# Patient Record
Sex: MALE | Race: WHITE | Employment: OTHER | URBAN - METROPOLITAN AREA
[De-identification: names, ages, dates, MRNs, and addresses within clinical notes are randomized per-mention and may not be internally consistent; named-entity substitution may affect disease eponyms.]

---

## 2020-03-05 ENCOUNTER — APPOINTMENT (OUTPATIENT)
Dept: GENERAL RADIOLOGY | Age: 75
End: 2020-03-05
Payer: MEDICARE

## 2020-03-05 ENCOUNTER — HOSPITAL ENCOUNTER (EMERGENCY)
Age: 75
Discharge: HOME | End: 2020-03-06
Attending: EMERGENCY MEDICINE
Payer: MEDICARE

## 2020-03-05 VITALS
HEIGHT: 67 IN | WEIGHT: 240 LBS | TEMPERATURE: 99 F | DIASTOLIC BLOOD PRESSURE: 86 MMHG | OXYGEN SATURATION: 94 % | BODY MASS INDEX: 37.67 KG/M2 | HEART RATE: 102 BPM | SYSTOLIC BLOOD PRESSURE: 140 MMHG | RESPIRATION RATE: 20 BRPM

## 2020-03-05 LAB
A/G RATIO: 1 (ref 1.1–2.2)
ALBUMIN SERPL-MCNC: 3.8 G/DL (ref 3.4–5)
ALP BLD-CCNC: 62 U/L (ref 40–129)
ALT SERPL-CCNC: 20 U/L (ref 10–40)
ANION GAP SERPL CALCULATED.3IONS-SCNC: 11 MMOL/L (ref 3–16)
AST SERPL-CCNC: 22 U/L (ref 15–37)
BASOPHILS ABSOLUTE: 0.1 K/UL (ref 0–0.2)
BASOPHILS RELATIVE PERCENT: 1.1 %
BILIRUB SERPL-MCNC: 0.3 MG/DL (ref 0–1)
BUN BLDV-MCNC: 32 MG/DL (ref 7–20)
CALCIUM SERPL-MCNC: 9.3 MG/DL (ref 8.3–10.6)
CHLORIDE BLD-SCNC: 98 MMOL/L (ref 99–110)
CO2: 28 MMOL/L (ref 21–32)
CREAT SERPL-MCNC: 1.4 MG/DL (ref 0.8–1.3)
EOSINOPHILS ABSOLUTE: 0 K/UL (ref 0–0.6)
EOSINOPHILS RELATIVE PERCENT: 0.5 %
GFR AFRICAN AMERICAN: 60
GFR NON-AFRICAN AMERICAN: 49
GLOBULIN: 3.8 G/DL
GLUCOSE BLD-MCNC: 104 MG/DL (ref 70–99)
HCT VFR BLD CALC: 34 % (ref 40.5–52.5)
HEMOGLOBIN: 10.9 G/DL (ref 13.5–17.5)
LYMPHOCYTES ABSOLUTE: 0.9 K/UL (ref 1–5.1)
LYMPHOCYTES RELATIVE PERCENT: 11.9 %
MAGNESIUM: 2.3 MG/DL (ref 1.8–2.4)
MCH RBC QN AUTO: 29.8 PG (ref 26–34)
MCHC RBC AUTO-ENTMCNC: 32.2 G/DL (ref 31–36)
MCV RBC AUTO: 92.6 FL (ref 80–100)
MONOCYTES ABSOLUTE: 0.9 K/UL (ref 0–1.3)
MONOCYTES RELATIVE PERCENT: 12.3 %
NEUTROPHILS ABSOLUTE: 5.7 K/UL (ref 1.7–7.7)
NEUTROPHILS RELATIVE PERCENT: 74.2 %
PDW BLD-RTO: 18.7 % (ref 12.4–15.4)
PLATELET # BLD: 264 K/UL (ref 135–450)
PMV BLD AUTO: 7 FL (ref 5–10.5)
POTASSIUM SERPL-SCNC: 4.5 MMOL/L (ref 3.5–5.1)
PRO-BNP: 1182 PG/ML (ref 0–449)
RBC # BLD: 3.67 M/UL (ref 4.2–5.9)
SODIUM BLD-SCNC: 137 MMOL/L (ref 136–145)
TOTAL PROTEIN: 7.6 G/DL (ref 6.4–8.2)
TROPONIN: <0.01 NG/ML
WBC # BLD: 7.7 K/UL (ref 4–11)

## 2020-03-05 PROCEDURE — 84484 ASSAY OF TROPONIN QUANT: CPT

## 2020-03-05 PROCEDURE — 93005 ELECTROCARDIOGRAM TRACING: CPT | Performed by: EMERGENCY MEDICINE

## 2020-03-05 PROCEDURE — 83735 ASSAY OF MAGNESIUM: CPT

## 2020-03-05 PROCEDURE — 6360000002 HC RX W HCPCS: Performed by: EMERGENCY MEDICINE

## 2020-03-05 PROCEDURE — 96374 THER/PROPH/DIAG INJ IV PUSH: CPT

## 2020-03-05 PROCEDURE — 71046 X-RAY EXAM CHEST 2 VIEWS: CPT

## 2020-03-05 PROCEDURE — 30901 CONTROL OF NOSEBLEED: CPT

## 2020-03-05 PROCEDURE — 99284 EMERGENCY DEPT VISIT MOD MDM: CPT

## 2020-03-05 PROCEDURE — 83880 ASSAY OF NATRIURETIC PEPTIDE: CPT

## 2020-03-05 PROCEDURE — 80053 COMPREHEN METABOLIC PANEL: CPT

## 2020-03-05 PROCEDURE — 85025 COMPLETE CBC W/AUTO DIFF WBC: CPT

## 2020-03-05 RX ORDER — OXYMETAZOLINE HYDROCHLORIDE 0.05 G/100ML
SPRAY NASAL
Status: DISCONTINUED
Start: 2020-03-05 | End: 2020-03-06 | Stop reason: HOSPADM

## 2020-03-05 RX ORDER — ONDANSETRON 2 MG/ML
4 INJECTION INTRAMUSCULAR; INTRAVENOUS ONCE
Status: COMPLETED | OUTPATIENT
Start: 2020-03-05 | End: 2020-03-05

## 2020-03-05 RX ADMIN — ONDANSETRON 4 MG: 2 INJECTION INTRAMUSCULAR; INTRAVENOUS at 23:25

## 2020-03-05 ASSESSMENT — ENCOUNTER SYMPTOMS
VOMITING: 0
ABDOMINAL PAIN: 0
NAUSEA: 0
SHORTNESS OF BREATH: 0
SORE THROAT: 0
COLOR CHANGE: 0
DIARRHEA: 0

## 2020-03-06 LAB
EKG ATRIAL RATE: 111 BPM
EKG DIAGNOSIS: NORMAL
EKG Q-T INTERVAL: 406 MS
EKG QRS DURATION: 106 MS
EKG QTC CALCULATION (BAZETT): 499 MS
EKG R AXIS: 91 DEGREES
EKG T AXIS: 38 DEGREES
EKG VENTRICULAR RATE: 91 BPM

## 2020-03-06 PROCEDURE — 6370000000 HC RX 637 (ALT 250 FOR IP): Performed by: EMERGENCY MEDICINE

## 2020-03-06 RX ORDER — CEFUROXIME AXETIL 250 MG/1
250 TABLET ORAL 2 TIMES DAILY
Qty: 10 TABLET | Refills: 0 | Status: SHIPPED | OUTPATIENT
Start: 2020-03-06 | End: 2020-03-11

## 2020-03-06 RX ORDER — CEFUROXIME AXETIL 250 MG/1
250 TABLET ORAL ONCE
Status: COMPLETED | OUTPATIENT
Start: 2020-03-06 | End: 2020-03-06

## 2020-03-06 RX ADMIN — CEFUROXIME AXETIL 250 MG: 250 TABLET ORAL at 00:25

## 2020-03-06 NOTE — ED PROVIDER NOTES
[address] [facility]  ED  EMERGENCY DEPARTMENT ENCOUNTER        Pt Name: Emeka Malcolm  MRN: [id number]  Nahomygfjoanna 1945  Date of evaluation: 3/5/2020  Provider: Hermann Hayden MD  PCP: Radha Chakraborty MD      [address] [place] [address]       Chief Complaint   Patient presents with    Epistaxis     Pts oxygen humidifier broke, pt also reports on blood thinners, bleeding started approx 20 mins ago and has been bleeding moderate amount since onset, believes its just right nostril. HISTORY OFPRESENT ILLNESS   (Location/Symptom, Timing/Onset, Context/Setting, Quality, Duration, Modifying Factors,Severity)  Note limiting factors. Emeka Malcolm is a 75 y.o. male presenting today due to concern for developing a nosebleed 20 minutes prior to arrival from the right nostril. He is on Xarelto. He states earlier this afternoon he was not feeling well with some fatigue and lightheadedness but denied any symptoms this evening other than the nosebleed. He states that the fatigue sensation went away after he drinks some fluid and ate dinner. He has been on fluid restriction with a history of congestive heart failure and is thinking that he was lightheaded since he was dehydrated. He normally wears oxygen with exertion but if he is at rest he does not need any. He uses a CPAP at night. He denies any chest pain or shortness of breath currently. He did feel lightheaded earlier today but denies that currently. No syncope. No headache or visual changes. No nausea or vomiting. No diarrhea. His main concern currently is the nosebleed which is what brought him to the emergency department by EMS. REVIEW OF SYSTEMS    (2-9 systems for level 4, 10 or more for level 5)     Review of Systems   Constitutional: Positive for fatigue. Negative for chills, diaphoresis and fever. HENT: Positive for nosebleeds (right nostril). Negative for congestion and sore throat. Eyes: Negative for visual disturbance. TABLET BY MOUTH ONCE DAILY    ATROVENT HFA 17 MCG/ACT INHALER    INHALE 1 PUFF INTO LUNGS THREE TIMES DAILY    DOCUSATE SODIUM 100 MG CAPS    Take 100 mg by mouth 2 times daily as needed for Constipation. DOXYCYCLINE (VIBRAMYCIN) 50 MG CAPSULE    Take 50 mg by mouth daily. FLUOCINONIDE (LIDEX) 0.05 % OINTMENT    APPLY TO THE AFFECTED AREA TWICE DAILY    FLUTICASONE (FLONASE) 50 MCG/ACT NASAL SPRAY    USE 2 SPRAYS IN EACH NOSTRIL DAILY    FLUTICASONE-SALMETEROL (ADVAIR DISKUS) 250-50 MCG/DOSE AEPB    Inhale 1 puff into the lungs every 12 hours. FUROSEMIDE (LASIX) 40 MG TABLET    Take 1 tablet by mouth daily for 10 days. METOPROLOL (LOPRESSOR) 50 MG TABLET    Take 1 tablet by mouth 2 times daily. Hold this medication if Systolic blood pressure (top number) is less than 90 or if heart rate is less than 60 and call Dr. Laurie Castillo office for further instructions. Ask for YOLIE HERNANDEZ [facility], [facility]. Call [phone]. OMEGA-3 FATTY ACIDS (OMEGA 3 PO)    Take  by mouth. ORAL MEDICATION CONTAINERS (MEDICATION CONTAINER/SMALL) MISC    Take 5 mLs by mouth 4 times daily as needed. Miles Mixture:  80 cc Viscous Lidocaine, 20 mg Hydrocortisone, 100 mg Doxycycline, 2 million units Nystatin, qs up to 180 cc with Benadryl elixir (cherry or grape)    POTASSIUM CHLORIDE (POTASSIUM CHLORIDE) 10 MEQ TABLET    Take 1 tablet by mouth 3 times daily (with meals) for 10 days. PSEUDOEPHEDRINE-IBUPROFEN  MG TABS    Take 1 capsule by mouth 4 times daily as needed. RIVAROXABAN (XARELTO) 20 MG TABS TABLET    TAKE 1 TABLET BY MOUTH DAILY    SIMVASTATIN (ZOCOR) 20 MG TABLET    TAKE 1 TABLET BY MOUTH EVERY NIGHT       ALLERGIES     Patient has no known allergies.     FAMILY HISTORY       Family History   Problem Relation Age of Onset    Macular Degen Sister     Thyroid Disease Mother     Cancer Neg Hx     Diabetes Neg Hx     Heart Disease Neg Hx     Kidney Disease Neg Hx           SOCIAL HISTORY       Social History Socioeconomic History    Marital status: Single     Spouse name: None    Number of children: None    Years of education: None    Highest education level: None   Occupational History    None   Social Needs    Financial resource strain: None    Food insecurity:     Worry: None     Inability: None    Transportation needs:     Medical: None     Non-medical: None   Tobacco Use    Smoking status: Former Smoker     Packs/day: 1.00     Years: 52.00     Pack years: 52.00     Types: Cigarettes     Last attempt to quit: 3/29/2013     Years since quittin.9    Smokeless tobacco: Never Used    Tobacco comment: using chantix   Substance and Sexual Activity    Alcohol use: No    Drug use: None    Sexual activity: None   Lifestyle    Physical activity:     Days per week: None     Minutes per session: None    Stress: None   Relationships    Social connections:     Talks on phone: None     Gets together: None     Attends Druze service: None     Active member of club or organization: None     Attends meetings of clubs or organizations: None     Relationship status: None    Intimate partner violence:     Fear of current or ex partner: None     Emotionally abused: None     Physically abused: None     Forced sexual activity: None   Other Topics Concern    None   Social History Narrative    None       SCREENINGS    [place] Coma Scale  Eye Opening: Spontaneous  Best Verbal Response: Oriented  Best Motor Response: Obeys commands  [place] Coma Scale Score: 15           PHYSICAL EXAM    (up to 7 for level 4, 8 or more for level 5)     ED Triage Vitals   BP Temp Temp src Pulse Resp SpO2 Height Weight   -- -- -- -- -- -- -- --       Physical Exam  Vitals signs and nursing note reviewed. Constitutional:       General: He is in acute distress (mild). Appearance: He is well-developed. He is not ill-appearing, toxic-appearing or diaphoretic. HENT:      Head: Normocephalic and atraumatic.       Right Ear: External ear normal.      Left Ear: External ear normal.      Nose: Mucosal edema and congestion present. No nasal deformity, signs of injury or nasal tenderness. Right Nostril: Epistaxis present. Left Nostril: No epistaxis. Right Sinus: No maxillary sinus tenderness. Left Sinus: No maxillary sinus tenderness. Mouth/Throat:      Mouth: Mucous membranes are moist.      Tongue: No lesions. Palate: No lesions. Pharynx: No oropharyngeal exudate. Tonsils: No tonsillar exudate. Eyes:      General: No scleral icterus. Right eye: No discharge. Left eye: No discharge. Pupils: Pupils are equal, round, and reactive to light. Neck:      Musculoskeletal: Full passive range of motion without pain, normal range of motion and neck supple. Normal range of motion. No edema, erythema, neck rigidity or muscular tenderness. Trachea: Trachea normal. No tracheal deviation. Cardiovascular:      Rate and Rhythm: Normal rate and regular rhythm. Pulses: Normal pulses. Pulmonary:      Effort: Pulmonary effort is normal. No accessory muscle usage or respiratory distress. Breath sounds: Normal breath sounds. No stridor. No decreased breath sounds, wheezing, rhonchi or rales. Chest:      Chest wall: No tenderness. Abdominal:      General: Abdomen is flat. Bowel sounds are normal. There is no distension. Palpations: Abdomen is soft. Tenderness: There is no abdominal tenderness. There is no guarding or rebound. Musculoskeletal: Normal range of motion. General: No swelling, tenderness, deformity or signs of injury. Skin:     General: Skin is warm and dry. Coloration: Skin is not jaundiced or pale. Findings: No bruising, erythema, lesion or rash. Neurological:      General: No focal deficit present. Mental Status: He is alert and oriented to person, place, and time. Mental status is at baseline.       GCS: GCS eye subscore is Nakia Gardner MD  03/06/20 0030

## 2020-09-14 ENCOUNTER — APPOINTMENT (OUTPATIENT)
Dept: GENERAL RADIOLOGY | Age: 75
End: 2020-09-14
Payer: MEDICARE

## 2020-09-14 ENCOUNTER — HOSPITAL ENCOUNTER (EMERGENCY)
Age: 75
Discharge: HOME | End: 2020-09-14
Attending: EMERGENCY MEDICINE
Payer: MEDICARE

## 2020-09-14 VITALS
TEMPERATURE: 97.7 F | OXYGEN SATURATION: 96 % | RESPIRATION RATE: 20 BRPM | HEART RATE: 58 BPM | SYSTOLIC BLOOD PRESSURE: 122 MMHG | DIASTOLIC BLOOD PRESSURE: 58 MMHG

## 2020-09-14 LAB
A/G RATIO: 0.9 (ref 1.1–2.2)
ALBUMIN SERPL-MCNC: 3.8 G/DL (ref 3.4–5)
ALP BLD-CCNC: 90 U/L (ref 40–129)
ALT SERPL-CCNC: 14 U/L (ref 10–40)
ANION GAP SERPL CALCULATED.3IONS-SCNC: 13 MMOL/L (ref 3–16)
AST SERPL-CCNC: 24 U/L (ref 15–37)
BASOPHILS ABSOLUTE: 0.1 K/UL (ref 0–0.2)
BASOPHILS RELATIVE PERCENT: 1.3 %
BILIRUB SERPL-MCNC: 0.4 MG/DL (ref 0–1)
BUN BLDV-MCNC: 21 MG/DL (ref 7–20)
CALCIUM SERPL-MCNC: 9.6 MG/DL (ref 8.3–10.6)
CHLORIDE BLD-SCNC: 103 MMOL/L (ref 99–110)
CO2: 24 MMOL/L (ref 21–32)
CREAT SERPL-MCNC: 1.3 MG/DL (ref 0.8–1.3)
EKG ATRIAL RATE: 59 BPM
EKG DIAGNOSIS: NORMAL
EKG P AXIS: 42 DEGREES
EKG P-R INTERVAL: 202 MS
EKG Q-T INTERVAL: 492 MS
EKG QRS DURATION: 120 MS
EKG QTC CALCULATION (BAZETT): 487 MS
EKG R AXIS: 94 DEGREES
EKG T AXIS: 109 DEGREES
EKG VENTRICULAR RATE: 59 BPM
EOSINOPHILS ABSOLUTE: 0.2 K/UL (ref 0–0.6)
EOSINOPHILS RELATIVE PERCENT: 2 %
GFR AFRICAN AMERICAN: >60
GFR NON-AFRICAN AMERICAN: 54
GLOBULIN: 4.4 G/DL
GLUCOSE BLD-MCNC: 122 MG/DL (ref 70–99)
HCT VFR BLD CALC: 32.1 % (ref 40.5–52.5)
HEMOGLOBIN: 10.4 G/DL (ref 13.5–17.5)
LYMPHOCYTES ABSOLUTE: 0.8 K/UL (ref 1–5.1)
LYMPHOCYTES RELATIVE PERCENT: 10.5 %
MCH RBC QN AUTO: 29.5 PG (ref 26–34)
MCHC RBC AUTO-ENTMCNC: 32.4 G/DL (ref 31–36)
MCV RBC AUTO: 91.1 FL (ref 80–100)
MONOCYTES ABSOLUTE: 0.8 K/UL (ref 0–1.3)
MONOCYTES RELATIVE PERCENT: 10.2 %
NEUTROPHILS ABSOLUTE: 6.1 K/UL (ref 1.7–7.7)
NEUTROPHILS RELATIVE PERCENT: 76 %
PDW BLD-RTO: 19 % (ref 12.4–15.4)
PLATELET # BLD: 323 K/UL (ref 135–450)
PMV BLD AUTO: 7.6 FL (ref 5–10.5)
POTASSIUM SERPL-SCNC: 4.3 MMOL/L (ref 3.5–5.1)
PRO-BNP: 930 PG/ML (ref 0–449)
RBC # BLD: 3.52 M/UL (ref 4.2–5.9)
SODIUM BLD-SCNC: 140 MMOL/L (ref 136–145)
TOTAL PROTEIN: 8.2 G/DL (ref 6.4–8.2)
TROPONIN: <0.01 NG/ML
WBC # BLD: 8 K/UL (ref 4–11)

## 2020-09-14 PROCEDURE — 84484 ASSAY OF TROPONIN QUANT: CPT

## 2020-09-14 PROCEDURE — 6360000002 HC RX W HCPCS: Performed by: EMERGENCY MEDICINE

## 2020-09-14 PROCEDURE — 93005 ELECTROCARDIOGRAM TRACING: CPT | Performed by: EMERGENCY MEDICINE

## 2020-09-14 PROCEDURE — 6370000000 HC RX 637 (ALT 250 FOR IP): Performed by: EMERGENCY MEDICINE

## 2020-09-14 PROCEDURE — 85025 COMPLETE CBC W/AUTO DIFF WBC: CPT

## 2020-09-14 PROCEDURE — 83880 ASSAY OF NATRIURETIC PEPTIDE: CPT

## 2020-09-14 PROCEDURE — 71045 X-RAY EXAM CHEST 1 VIEW: CPT

## 2020-09-14 PROCEDURE — 94761 N-INVAS EAR/PLS OXIMETRY MLT: CPT

## 2020-09-14 PROCEDURE — 80053 COMPREHEN METABOLIC PANEL: CPT

## 2020-09-14 PROCEDURE — 30901 CONTROL OF NOSEBLEED: CPT

## 2020-09-14 PROCEDURE — 2700000000 HC OXYGEN THERAPY PER DAY

## 2020-09-14 PROCEDURE — 94640 AIRWAY INHALATION TREATMENT: CPT

## 2020-09-14 PROCEDURE — 93010 ELECTROCARDIOGRAM REPORT: CPT | Performed by: INTERNAL MEDICINE

## 2020-09-14 PROCEDURE — 99285 EMERGENCY DEPT VISIT HI MDM: CPT

## 2020-09-14 PROCEDURE — 96374 THER/PROPH/DIAG INJ IV PUSH: CPT

## 2020-09-14 RX ORDER — PREDNISONE 10 MG/1
40 TABLET ORAL DAILY
Qty: 20 TABLET | Refills: 0 | Status: SHIPPED | OUTPATIENT
Start: 2020-09-15 | End: 2020-09-20

## 2020-09-14 RX ORDER — IPRATROPIUM BROMIDE AND ALBUTEROL SULFATE 2.5; .5 MG/3ML; MG/3ML
1 SOLUTION RESPIRATORY (INHALATION)
Status: DISCONTINUED | OUTPATIENT
Start: 2020-09-14 | End: 2020-09-14

## 2020-09-14 RX ORDER — FLUOXETINE 10 MG/1
10 CAPSULE ORAL DAILY
Status: ON HOLD | COMMUNITY
End: 2022-01-01 | Stop reason: CLARIF

## 2020-09-14 RX ORDER — FERROUS SULFATE 325(65) MG
325 TABLET ORAL 2 TIMES DAILY
Status: ON HOLD | COMMUNITY
End: 2022-01-01 | Stop reason: HOSPADM

## 2020-09-14 RX ORDER — IPRATROPIUM BROMIDE AND ALBUTEROL SULFATE 2.5; .5 MG/3ML; MG/3ML
1 SOLUTION RESPIRATORY (INHALATION) ONCE
Status: COMPLETED | OUTPATIENT
Start: 2020-09-14 | End: 2020-09-14

## 2020-09-14 RX ORDER — METHYLPREDNISOLONE SODIUM SUCCINATE 40 MG/ML
40 INJECTION, POWDER, LYOPHILIZED, FOR SOLUTION INTRAMUSCULAR; INTRAVENOUS ONCE
Status: COMPLETED | OUTPATIENT
Start: 2020-09-14 | End: 2020-09-14

## 2020-09-14 RX ADMIN — Medication 1 EACH: at 01:44

## 2020-09-14 RX ADMIN — IPRATROPIUM BROMIDE AND ALBUTEROL SULFATE 1 AMPULE: .5; 3 SOLUTION RESPIRATORY (INHALATION) at 02:15

## 2020-09-14 RX ADMIN — METHYLPREDNISOLONE SODIUM SUCCINATE 40 MG: 40 INJECTION, POWDER, FOR SOLUTION INTRAMUSCULAR; INTRAVENOUS at 01:43

## 2020-09-14 NOTE — ED NOTES
Discharge instructions reviewed without questions. No further needs voiced at this time. Assisted to private vehicle from department in stable condition.        Jose Casey RN  09/14/20 1807

## 2020-09-14 NOTE — ED PROVIDER NOTES
[address] [facility]  ED  [facility]      Pt Name: Tristian Olivares  MRN: [id number]  Nahomygfjoanna 1945  Date of evaluation: 9/14/2020  Provider: Damaris Forrester MD    CHIEF COMPLAINT       Chief Complaint   Patient presents with    Epistaxis     left sided, nearly stopped upon arrival    Shortness of Breath     chronic, on 2L nasal cannula at home - worse over last couple weeks         HISTORY OF PRESENT ILLNESS   (Location/Symptom, Timing/Onset,Context/Setting, Quality, Duration, Modifying Factors, Severity)  Note limiting factors. Tristian Olivares is a 75 y.o. male who presents to the emergency department for nosebleed and shortness of breath. The patient states that he was having bleeding from the left nostril which he thought would have worsened so he came to the emergency room as he had had a severe nosebleed in the past however he has noticed that the bleeding has improved. Started at about 11:30 PM.  He states that he is no longer an anticoagulation. The patient has a history of COPD and he said there was questionable fluid versus pneumonia on his lungs in the past.  He said that he has chronic shortness of breath and is on 2 L at baseline but over the past 2 weeks he has had worsening shortness of breath which was more pronounced this evening when he was getting to the car to come in for his nosebleed. No chest pain. He has been coughing. No fevers or chills. Nursing notes were reviewed. REVIEW OF SYSTEMS    (2-9 systems for level 4, 10 or more for level 5)     Review of Systems    Positive and pertinent negative as per HPI. Except as noted above in the ROS, all other systems were reviewed and were negative.     PAST MEDICAL HISTORY     Past Medical History:   Diagnosis Date    Acute bronchitis     Adrenal adenoma 6/05    noted on CT, 2.2 x 1cm    Atrial fibrillation ([address]     Atrial fibrillation ([address] 5/12/2010    Benign neoplasm of colon 5/17/2010    Calculus of kidney     Colonic polyps     benign    Diverticulitis     Diverticulitis of colon (without mention of hemorrhage)(562.11) 5/17/2010    Fatty liver     Hyperlipidemia     Kidney stone 6/05    Other and unspecified hyperlipidemia 5/17/2010    Other chronic nonalcoholic liver disease 9/01/8933    Palpitations 8/04    echo showed diastolic dysfunction, holter monitor ( + )  for brief runs of SVT    Renal cyst 11/07    bilateral (7cm on L and 3 cm on R), no adrenal mass    Steatosis, liver 5/22/2010    Tobacco use disorder 5/17/2010         SURGICALHISTORY       Past Surgical History:   Procedure Laterality Date    CARDIOVASCULAR STRESS TEST  10/07, 10/05    stress echo-normal, negative    COLONOSCOPY  7/08    polyps and diverticuli, tubular adenoma    DOPPLER ECHOCARDIOGRAPHY  6/09    moderate in crease LA, otherwise ( - )   [address] SURGERY  6/05    LIVER BIOPSY  3/07    fatty liver - no autoimmune hepatits         CURRENT MEDICATIONS       Discharge Medication List as of 9/14/2020  3:24 AM      CONTINUE these medications which have NOT CHANGED    Details   ferrous sulfate (IRON 325) 325 (65 Fe) MG tablet Take 325 mg by mouth 2 times dailyHistorical Med      FLUoxetine (PROZAC) 10 MG capsule Take 10 mg by mouth dailyHistorical Med      ATROVENT HFA 17 MCG/ACT inhaler INHALE 1 PUFF INTO LUNGS THREE TIMES DAILY, Disp-25.8 g, R-2Normal      amiodarone (CORDARONE) 200 MG tablet TAKE 1 TABLET BY MOUTH EVERY DAY, Disp-30 tablet, R-5      fluticasone (FLONASE) 50 MCG/ACT nasal spray USE 2 SPRAYS IN EACH NOSTRIL DAILY, Disp-1 Bottle, R-5      atorvastatin (LIPITOR) 40 MG tablet TAKE 1 TABLET BY MOUTH ONCE DAILY, Disp-90 tablet, R-3**Patient requests 90 days supply**      Omega-3 Fatty Acids (OMEGA 3 PO) Take  by mouth. simvastatin (ZOCOR) 20 MG tablet TAKE 1 TABLET BY MOUTH EVERY NIGHT, Disp-30 tablet, R-0      furosemide (LASIX) 40 MG tablet Take 1 tablet by mouth daily for 10 days. , Disp-10 tablet,R-0DC to SNF      docusate sodium 100 MG CAPS Take 100 mg by mouth 2 times daily as needed for Constipation. potassium chloride (POTASSIUM CHLORIDE) 10 MEQ tablet Take 1 tablet by mouth 3 times daily (with meals) for 10 days. , Disp-30 tabletDC to SNF      aspirin 81 MG chewable tablet Take 1 tablet by mouth daily. , Disp-30 tablet, R-5      metoprolol (LOPRESSOR) 50 MG tablet Take 1 tablet by mouth 2 times daily. Hold this medication if Systolic blood pressure (top number) is less than 90 or if heart rate is less than 60 and call Dr. Bryan Otero office for further instructions. Ask for [facility] HER[facility]DEZ [facility]. Call [phone]., Disp-60 tab let      rivaroxaban (XARELTO) 20 MG TABS tablet TAKE 1 TABLET BY MOUTH DAILYHistorical Med      Oral Medication Containers (MEDICATION CONTAINER/SMALL) MISC 4 TIMES DAILY PRN Starting 2/19/2015, Until Discontinued, Disp-1 each, R-0, PrintMiles Mixture:  80 cc Viscous Lidocaine, 20 mg Hydrocortisone, 100 mg Doxycycline, 2 million units Nystatin, qs up to 180 cc with Benadryl elixir (cherry or grape)      fluocinonide (LIDEX) 0.05 % ointment APPLY TO THE AFFECTED AREA TWICE DAILY, Disp-15 g, R-1, Normal      Pseudoephedrine-Ibuprofen  MG TABS Take 1 capsule by mouth 4 times daily as needed. , Disp-24 tablet, R-0      doxycycline (VIBRAMYCIN) 50 MG capsule Take 50 mg by mouth daily. fluticasone-salmeterol (ADVAIR DISKUS) 250-50 MCG/DOSE AEPB Inhale 1 puff into the lungs every 12 hours. ALLERGIES     Patient has no known allergies.     FAMILY HISTORY       Family History   Problem Relation Age of Onset    Macular Degen Sister     Thyroid Disease Mother     Cancer Neg Hx     Diabetes Neg Hx     Heart Disease Neg Hx     Kidney Disease Neg Hx           SOCIAL HISTORY       Social History     Socioeconomic History    Marital status: Single     Spouse name: None    Number of children: None    Years of education: None    Highest education level: None Occupational History    None   Social Needs    Financial resource strain: None    Food insecurity     Worry: None     Inability: None    Transportation needs     Medical: None     Non-medical: None   Tobacco Use    Smoking status: Former Smoker     Packs/day: 1.00     Years: 52.00     Pack years: 52.00     Types: Cigarettes     Last attempt to quit: 3/29/2013     Years since quittin.4    Smokeless tobacco: Never Used    Tobacco comment: using chantix   Substance and Sexual Activity    Alcohol use: No    Drug use: None    Sexual activity: None   Lifestyle    Physical activity     Days per week: None     Minutes per session: None    Stress: None   Relationships    Social connections     Talks on phone: None     Gets together: None     Attends Restoration service: None     Active member of club or organization: None     Attends meetings of clubs or organizations: None     Relationship status: None    Intimate partner violence     Fear of current or ex partner: None     Emotionally abused: None     Physically abused: None     Forced sexual activity: None   Other Topics Concern    None   Social History Narrative    None       SCREENINGS             PHYSICAL EXAM    (up to 7 for level 4, 8 or more for level 5)     ED Triage Vitals [20 0104]   BP Temp Temp Source Pulse Resp SpO2 Height Weight   (!) 153/68 97.7 °F (36.5 °C) Oral 67 22 91 % -- --       Physical Exam  Vitals signs and nursing note reviewed. Constitutional:       Appearance: Normal appearance. He is well-developed. He is not ill-appearing. HENT:      Head: Normocephalic and atraumatic. Right Ear: External ear normal.      Left Ear: External ear normal.      Nose: Nose normal.      Comments: Patient has shallow ulceration of the left nasal septum. There is no active bleeding but there is some oozing. There is no nasal septal perforation. No foreign body. No stable septum deviation or hematoma.   Eyes:      General: No scleral icterus. Right eye: No discharge. Left eye: No discharge. Conjunctiva/sclera: Conjunctivae normal.   Neck:      Musculoskeletal: Neck supple. Cardiovascular:      Rate and Rhythm: Normal rate and regular rhythm. Heart sounds: Normal heart sounds. Pulmonary:      Effort: Pulmonary effort is normal. No respiratory distress. Breath sounds: Wheezing and rales present. Chest:      Chest wall: No tenderness. Abdominal:      General: Bowel sounds are normal.   Skin:     Coloration: Skin is not pale. Neurological:      Mental Status: He is alert. Psychiatric:         Mood and Affect: Mood normal.         Behavior: Behavior normal.           DIAGNOSTIC RESULTS     EKG: All EKG's are interpreted by the Emergency Department Physician who either signs or Co-signs this chart in the absence of a cardiologist.    12 lead EKG shows sinus bradycardia rate of 59 bpm, [place] interval slightly prolonged at 202 ms. Also QRS prolonged at 120 was, QTc normal.  No acute ischemic findings. Besides the rate no significant changes from the previous EKG March 2020. RADIOLOGY:   Non-plain film images such as CT, Ultrasound and MRI are read by the radiologist. Plain radiographic images are visualized and preliminarily interpreted by the emergency physician with the below findings:      Interpretation per the Radiologist below, if available at the time of this note:    XR CHEST PORTABLE   Final Result   Cardiomegaly with pulmonary vascular congestion.                ED BEDSIDE ULTRASOUND:   Performed by ED Physician - none    LABS:  Labs Reviewed   CBC WITH AUTO DIFFERENTIAL - Abnormal; Notable for the following components:       Result Value    RBC 3.52 (*)     Hemoglobin 10.4 (*)     Hematocrit 32.1 (*)     RDW 19.0 (*)     Lymphocytes Absolute 0.8 (*)     All other components within normal limits    Narrative:     Performed at:  [facility]  [address] [place] [address], [address], [address]   Phone [phone]   COMPREHENSIVE METABOLIC PANEL - Abnormal; Notable for the following components:    Glucose 122 (*)     BUN 21 (*)     GFR Non-African American 54 (*)     Albumin/Globulin Ratio 0.9 (*)     All other components within normal limits    Narrative:     Performed at:  [address],  [address], [address]   Phone [phone]   BRAIN NATRIURETIC PEPTIDE - Abnormal; Notable for the following components:    Pro- (*)     All other components within normal limits    Narrative:     Performed at:  [facility]  [address],  [address], [address]   Phone [phone]   TROPONIN    Narrative:     Performed at:  [address],  [address], [address]   Phone [phone]       All other labs were within normal range or not returned as of this dictation. EMERGENCY DEPARTMENT COURSE and DIFFERENTIAL DIAGNOSIS/MDM:   Vitals:    Vitals:    09/14/20 0104 09/14/20 0215 09/14/20 0234   BP: (!) 153/68  (!) 122/58   Pulse: 67  58   Resp: 22 19 20   Temp: 97.7 °F (36.5 °C)     TempSrc: Oral     SpO2: 91% 94% 96%       Adult male who came in for epistaxis and shortness of breath. For his epistaxis silver nitrate will be applied to the nasal septum. For his shortness of breath laboratory studies chest x-ray and EKG ordered. Patient is given duo nebs x3 as well as IV Solu-Medrol. Patient is reassessed and the patient is feeling improved. Laboratory studies showed no significant abnormalities. The patient chest x-ray shows cardiomegaly with vascular congestion but with no pulmonary edema. Given the fact that the patient is feeling improved with regards to his shortness of breath I do not believe that any further care is required at this time. His epistaxis is controlled.   I did perform silver nitrate application to the left nasal septum with no complications. Our findings are discussed with the patient. He is encouraged to follow-up with his primary care setting. Return instructions are discussed. Patient expresses understanding and is agreeable to treatment plan. CRITICAL CARE TIME   None       CONSULTS:  None    PROCEDURES:  Unless otherwise noted above, none     Procedures    FINAL IMPRESSION      1. Epistaxis    2.  COPD exacerbation [facility])          DISPOSITION/PLAN   DISPOSITION Decision To Discharge 09/14/2020 03:22:31 AM      PATIENT REFERREDTO:  Luz Champion MD  [address]   [place] [facility]  [phone]    Schedule an appointment as soon as possible for a visit         DISCHARGEMEDICATIONS:  Discharge Medication List as of 9/14/2020  3:24 AM      START taking these medications    Details   predniSONE (DELTASONE) 10 MG tablet Take 4 tablets by mouth daily for 5 doses, Disp-20 tablet,R-0Print                (Please note that portions of this note were completed with a voice recognition program.  Efforts were made to edit the dictations but occasionally words are mis-transcribed.)    Kandi Crooks MD (electronically signed)  Attending Emergency Physician       Kandi Crooks MD  09/14/20 0841

## 2020-09-14 NOTE — ED NOTES
Patient identified as a positive fall risk on the ED triage fall screening. Patient placed in fall precautions which includes:  yellow fall risk bracelet on wrist, yellow socks on feet, Patient instructed on importance of not getting out of bed or ambulating without assistance for safety.           America Kraft RN  09/14/20 8029

## 2020-09-14 NOTE — ED NOTES
Bleeding from left nare upon arrival. Controlled. On home O2, has had issues with humidification at home with concentrator. Hx same issues.       Maxine Luna RN  09/14/20 5833

## 2020-09-15 ENCOUNTER — CARE COORDINATION (OUTPATIENT)
Dept: CARE COORDINATION | Age: 75
End: 2020-09-15

## 2020-09-15 NOTE — CARE COORDINATION
Patient contacted regarding Sudarshan Condon. Discussed COVID-19 related testing which was not done at this time. Test results were not done. Patient informed of results, if available? N/A    He states that he is doing well today. No more nosebleeding. He is taking the prednisone as prescribed and feels as if his breathing has improved. No conversational dyspnea on the phone today. He is using his nebulizer TID, as well as his maintenance inhaler. He wears supplemental O2. He states that he thinks he was SOB yesterday because he was using his portable tank, that only goes up to 3L. He is trying to get a bigger tank along with his O2 concentrator. He states that he has a call out to his PCP today to discuss. Care Transition Nurse/ Ambulatory Care Manager contacted the patient by telephone to perform post discharge assessment. Call within 2 business days of discharge: Yes. Verified name and  with patient as identifiers. Provided introduction to self, and explanation of the CTN/ACM role, and reason for call due to risk factors for infection and/or exposure to COVID-19. Symptoms reviewed with patient who verbalized the following symptoms: no new symptoms and no worsening symptoms. Due to no new or worsening symptoms encounter was not routed to provider for escalation. Discussed follow-up appointments. If no appointment was previously scheduled, appointment scheduling offered: [facility] follow up appointment(s): No future appointments. [facility] follow up appointment(s): PCP at [facility]    Non-face-to-face services provided:  Education of patient/family/caregiver/guardian to support self-management-supportive care; O2   Assessment and support for treatment adherence and medication management-inhalers and nebulizer regimen     Advance Care Planning:   Does patient have an Advance Directive:  not on file; education provided. Patient has following risk factors of: COPD and Obesity.  CTN/ACM reviewed discharge

## 2020-09-22 ENCOUNTER — CARE COORDINATION (OUTPATIENT)
Dept: CARE COORDINATION | Age: 75
End: 2020-09-22

## 2020-09-22 NOTE — CARE COORDINATION
Attempted outreach call; left a  with ACM call-back information. No future appointments. Fran Hooper MSN, RN  Ambulatory Care Manager  [phone]  [email or web address]

## 2020-09-29 ENCOUNTER — CARE COORDINATION (OUTPATIENT)
Dept: CARE COORDINATION | Age: 75
End: 2020-09-29

## 2020-09-29 NOTE — CARE COORDINATION
You Patient resolved from the Care Transitions episode on 9/29/20  Discussed COVID-19 related testing which was not done at this time. Test results were not done. Patient informed of results, if available? N/A    Patient/family has been provided the following resources and education related to COVID-19:                         Signs, symptoms and red flags related to COVID-19            CDC exposure and quarantine guidelines            Conduit exposure contact - [phone]            Contact for their local Department of Health                 Patient currently reports that the following symptoms have improved:  14 day outreach; left a final VM with ACM call back information. No further outreach scheduled with this CTN/ACM. Episode of Care resolved. Patient has this CTN/ACM contact information if future needs arise. No future appointments. Telma Silva MSN, RN  Ambulatory Care Manager  [phone]  [email or web address]

## 2022-01-01 ENCOUNTER — APPOINTMENT (OUTPATIENT)
Dept: GENERAL RADIOLOGY | Age: 77
DRG: 871 | End: 2022-01-01
Payer: MEDICARE

## 2022-01-01 ENCOUNTER — HOSPITAL ENCOUNTER (INPATIENT)
Age: 77
LOS: 2 days | DRG: 193 | End: 2022-02-28
Attending: EMERGENCY MEDICINE
Payer: MEDICARE

## 2022-01-01 ENCOUNTER — HOSPITAL ENCOUNTER (INPATIENT)
Age: 77
LOS: 11 days | Discharge: NURSING, REHAB OR LONG-TERM CARE | DRG: 871 | End: 2022-02-02
Attending: EMERGENCY MEDICINE | Admitting: INTERNAL MEDICINE
Payer: MEDICARE

## 2022-01-01 ENCOUNTER — APPOINTMENT (OUTPATIENT)
Dept: CT IMAGING | Age: 77
DRG: 193 | End: 2022-01-01
Payer: MEDICARE

## 2022-01-01 ENCOUNTER — APPOINTMENT (OUTPATIENT)
Dept: GENERAL RADIOLOGY | Age: 77
DRG: 193 | End: 2022-01-01
Payer: MEDICARE

## 2022-01-01 ENCOUNTER — TELEPHONE (OUTPATIENT)
Dept: INFECTIOUS DISEASES | Age: 77
End: 2022-01-01

## 2022-01-01 ENCOUNTER — APPOINTMENT (OUTPATIENT)
Dept: INTERVENTIONAL RADIOLOGY/VASCULAR | Age: 77
DRG: 871 | End: 2022-01-01
Payer: MEDICARE

## 2022-01-01 VITALS
HEART RATE: 121 BPM | SYSTOLIC BLOOD PRESSURE: 113 MMHG | HEIGHT: 67 IN | WEIGHT: 169.09 LBS | BODY MASS INDEX: 26.54 KG/M2 | RESPIRATION RATE: 10 BRPM | OXYGEN SATURATION: 79 % | DIASTOLIC BLOOD PRESSURE: 65 MMHG | TEMPERATURE: 97.8 F

## 2022-01-01 VITALS
DIASTOLIC BLOOD PRESSURE: 76 MMHG | RESPIRATION RATE: 22 BRPM | SYSTOLIC BLOOD PRESSURE: 155 MMHG | OXYGEN SATURATION: 92 % | WEIGHT: 176.8 LBS | HEIGHT: 68 IN | BODY MASS INDEX: 26.8 KG/M2 | TEMPERATURE: 97.8 F | HEART RATE: 105 BPM

## 2022-01-01 DIAGNOSIS — R06.02 SHORTNESS OF BREATH: ICD-10-CM

## 2022-01-01 DIAGNOSIS — J96.11 CHRONIC RESPIRATORY FAILURE WITH HYPOXIA AND HYPERCAPNIA (HCC): ICD-10-CM

## 2022-01-01 DIAGNOSIS — I48.0 PAROXYSMAL ATRIAL FIBRILLATION (HCC): ICD-10-CM

## 2022-01-01 DIAGNOSIS — J18.9 HCAP (HEALTHCARE-ASSOCIATED PNEUMONIA): Primary | ICD-10-CM

## 2022-01-01 DIAGNOSIS — J96.12 CHRONIC RESPIRATORY FAILURE WITH HYPOXIA AND HYPERCAPNIA (HCC): ICD-10-CM

## 2022-01-01 DIAGNOSIS — R00.0 TACHYCARDIA: Primary | ICD-10-CM

## 2022-01-01 LAB
A/G RATIO: 0.6 (ref 1.1–2.2)
A/G RATIO: 0.7 (ref 1.1–2.2)
ADENOVIRUS PCR: NOT DETECTED
ALBUMIN SERPL-MCNC: 2.7 G/DL (ref 3.4–5)
ALBUMIN SERPL-MCNC: 3 G/DL (ref 3.4–5)
ALP BLD-CCNC: 115 U/L (ref 40–129)
ALP BLD-CCNC: 148 U/L (ref 40–129)
ALT SERPL-CCNC: 11 U/L (ref 10–40)
ALT SERPL-CCNC: 21 U/L (ref 10–40)
ANION GAP SERPL CALCULATED.3IONS-SCNC: 10 MMOL/L (ref 3–16)
ANION GAP SERPL CALCULATED.3IONS-SCNC: 10 MMOL/L (ref 3–16)
ANION GAP SERPL CALCULATED.3IONS-SCNC: 11 MMOL/L (ref 3–16)
ANION GAP SERPL CALCULATED.3IONS-SCNC: 11 MMOL/L (ref 3–16)
ANION GAP SERPL CALCULATED.3IONS-SCNC: 12 MMOL/L (ref 3–16)
ANION GAP SERPL CALCULATED.3IONS-SCNC: 12 MMOL/L (ref 3–16)
ANION GAP SERPL CALCULATED.3IONS-SCNC: 13 MMOL/L (ref 3–16)
ANION GAP SERPL CALCULATED.3IONS-SCNC: 2 MMOL/L (ref 3–16)
ANION GAP SERPL CALCULATED.3IONS-SCNC: 2 MMOL/L (ref 3–16)
ANION GAP SERPL CALCULATED.3IONS-SCNC: 4 MMOL/L (ref 3–16)
ANION GAP SERPL CALCULATED.3IONS-SCNC: 5 MMOL/L (ref 3–16)
ANION GAP SERPL CALCULATED.3IONS-SCNC: 5 MMOL/L (ref 3–16)
ANION GAP SERPL CALCULATED.3IONS-SCNC: 7 MMOL/L (ref 3–16)
APTT: 33.5 SEC (ref 26.2–38.6)
AST SERPL-CCNC: 17 U/L (ref 15–37)
AST SERPL-CCNC: 34 U/L (ref 15–37)
BASE EXCESS ARTERIAL: 12 MMOL/L (ref -3–3)
BASE EXCESS ARTERIAL: 8.6 MMOL/L (ref -3–3)
BASE EXCESS VENOUS: 15.2 MMOL/L (ref -3–3)
BASE EXCESS VENOUS: 3.7 MMOL/L (ref -3–3)
BASOPHILS ABSOLUTE: 0 K/UL (ref 0–0.2)
BASOPHILS ABSOLUTE: 0 K/UL (ref 0–0.2)
BASOPHILS ABSOLUTE: 0.1 K/UL (ref 0–0.2)
BASOPHILS RELATIVE PERCENT: 0.1 %
BASOPHILS RELATIVE PERCENT: 0.1 %
BASOPHILS RELATIVE PERCENT: 0.4 %
BILIRUB SERPL-MCNC: 0.5 MG/DL (ref 0–1)
BILIRUB SERPL-MCNC: <0.2 MG/DL (ref 0–1)
BILIRUBIN URINE: NEGATIVE
BLOOD, URINE: NEGATIVE
BUN BLDV-MCNC: 22 MG/DL (ref 7–20)
BUN BLDV-MCNC: 24 MG/DL (ref 7–20)
BUN BLDV-MCNC: 24 MG/DL (ref 7–20)
BUN BLDV-MCNC: 27 MG/DL (ref 7–20)
BUN BLDV-MCNC: 28 MG/DL (ref 7–20)
BUN BLDV-MCNC: 28 MG/DL (ref 7–20)
BUN BLDV-MCNC: 30 MG/DL (ref 7–20)
BUN BLDV-MCNC: 31 MG/DL (ref 7–20)
BUN BLDV-MCNC: 33 MG/DL (ref 7–20)
BUN BLDV-MCNC: 35 MG/DL (ref 7–20)
BUN BLDV-MCNC: 35 MG/DL (ref 7–20)
BUN BLDV-MCNC: 36 MG/DL (ref 7–20)
BUN BLDV-MCNC: 38 MG/DL (ref 7–20)
CALCIUM SERPL-MCNC: 8.8 MG/DL (ref 8.3–10.6)
CALCIUM SERPL-MCNC: 8.8 MG/DL (ref 8.3–10.6)
CALCIUM SERPL-MCNC: 8.9 MG/DL (ref 8.3–10.6)
CALCIUM SERPL-MCNC: 9 MG/DL (ref 8.3–10.6)
CALCIUM SERPL-MCNC: 9 MG/DL (ref 8.3–10.6)
CALCIUM SERPL-MCNC: 9.1 MG/DL (ref 8.3–10.6)
CALCIUM SERPL-MCNC: 9.2 MG/DL (ref 8.3–10.6)
CALCIUM SERPL-MCNC: 9.2 MG/DL (ref 8.3–10.6)
CALCIUM SERPL-MCNC: 9.3 MG/DL (ref 8.3–10.6)
CALCIUM SERPL-MCNC: 9.3 MG/DL (ref 8.3–10.6)
CALCIUM SERPL-MCNC: 9.8 MG/DL (ref 8.3–10.6)
CARBOXYHEMOGLOBIN ARTERIAL: 0.1 % (ref 0–1.5)
CARBOXYHEMOGLOBIN ARTERIAL: 0.2 % (ref 0–1.5)
CARBOXYHEMOGLOBIN: 2.5 % (ref 0–1.5)
CARBOXYHEMOGLOBIN: 3.1 % (ref 0–1.5)
CHLORIDE BLD-SCNC: 100 MMOL/L (ref 99–110)
CHLORIDE BLD-SCNC: 100 MMOL/L (ref 99–110)
CHLORIDE BLD-SCNC: 101 MMOL/L (ref 99–110)
CHLORIDE BLD-SCNC: 102 MMOL/L (ref 99–110)
CHLORIDE BLD-SCNC: 104 MMOL/L (ref 99–110)
CHLORIDE BLD-SCNC: 104 MMOL/L (ref 99–110)
CHLORIDE BLD-SCNC: 96 MMOL/L (ref 99–110)
CHLORIDE BLD-SCNC: 98 MMOL/L (ref 99–110)
CLARITY: CLEAR
CO2: 24 MMOL/L (ref 21–32)
CO2: 26 MMOL/L (ref 21–32)
CO2: 26 MMOL/L (ref 21–32)
CO2: 27 MMOL/L (ref 21–32)
CO2: 28 MMOL/L (ref 21–32)
CO2: 29 MMOL/L (ref 21–32)
CO2: 31 MMOL/L (ref 21–32)
CO2: 31 MMOL/L (ref 21–32)
CO2: 32 MMOL/L (ref 21–32)
CO2: 33 MMOL/L (ref 21–32)
CO2: 33 MMOL/L (ref 21–32)
CO2: 34 MMOL/L (ref 21–32)
CO2: 38 MMOL/L (ref 21–32)
COLOR: YELLOW
CREAT SERPL-MCNC: 0.6 MG/DL (ref 0.8–1.3)
CREAT SERPL-MCNC: 0.7 MG/DL (ref 0.8–1.3)
CREAT SERPL-MCNC: 0.8 MG/DL (ref 0.8–1.3)
CREAT SERPL-MCNC: 0.8 MG/DL (ref 0.8–1.3)
CREAT SERPL-MCNC: 0.9 MG/DL (ref 0.8–1.3)
CREAT SERPL-MCNC: <0.5 MG/DL (ref 0.8–1.3)
CREAT SERPL-MCNC: <0.5 MG/DL (ref 0.8–1.3)
CULTURE, RESPIRATORY: ABNORMAL
CULTURE, RESPIRATORY: ABNORMAL
D DIMER: 734 NG/ML DDU (ref 0–229)
EKG ATRIAL RATE: 113 BPM
EKG ATRIAL RATE: 127 BPM
EKG ATRIAL RATE: 357 BPM
EKG DIAGNOSIS: NORMAL
EKG Q-T INTERVAL: 354 MS
EKG Q-T INTERVAL: 364 MS
EKG Q-T INTERVAL: 378 MS
EKG QRS DURATION: 120 MS
EKG QRS DURATION: 122 MS
EKG QRS DURATION: 124 MS
EKG QTC CALCULATION (BAZETT): 467 MS
EKG QTC CALCULATION (BAZETT): 499 MS
EKG QTC CALCULATION (BAZETT): 516 MS
EKG R AXIS: 107 DEGREES
EKG R AXIS: 85 DEGREES
EKG R AXIS: 92 DEGREES
EKG T AXIS: -2 DEGREES
EKG T AXIS: -7 DEGREES
EKG T AXIS: 0 DEGREES
EKG VENTRICULAR RATE: 105 BPM
EKG VENTRICULAR RATE: 105 BPM
EKG VENTRICULAR RATE: 121 BPM
EOSINOPHILS ABSOLUTE: 0 K/UL (ref 0–0.6)
EOSINOPHILS RELATIVE PERCENT: 0 %
ESTIMATED AVERAGE GLUCOSE: 102.5 MG/DL
FUNGUS (MYCOLOGY) CULTURE: NORMAL
FUNGUS STAIN: NORMAL
GFR AFRICAN AMERICAN: >60
GFR NON-AFRICAN AMERICAN: >60
GLUCOSE BLD-MCNC: 102 MG/DL (ref 70–99)
GLUCOSE BLD-MCNC: 104 MG/DL (ref 70–99)
GLUCOSE BLD-MCNC: 108 MG/DL (ref 70–99)
GLUCOSE BLD-MCNC: 108 MG/DL (ref 70–99)
GLUCOSE BLD-MCNC: 110 MG/DL (ref 70–99)
GLUCOSE BLD-MCNC: 119 MG/DL (ref 70–99)
GLUCOSE BLD-MCNC: 120 MG/DL (ref 70–99)
GLUCOSE BLD-MCNC: 120 MG/DL (ref 70–99)
GLUCOSE BLD-MCNC: 121 MG/DL (ref 70–99)
GLUCOSE BLD-MCNC: 135 MG/DL (ref 70–99)
GLUCOSE BLD-MCNC: 217 MG/DL (ref 70–99)
GLUCOSE BLD-MCNC: 83 MG/DL (ref 70–99)
GLUCOSE BLD-MCNC: 92 MG/DL (ref 70–99)
GLUCOSE BLD-MCNC: 98 MG/DL (ref 70–99)
GLUCOSE URINE: NEGATIVE MG/DL
GRAM STAIN RESULT: ABNORMAL
HBA1C MFR BLD: 5.2 %
HCO3 ARTERIAL: 36.7 MMOL/L (ref 21–29)
HCO3 ARTERIAL: 39.8 MMOL/L (ref 21–29)
HCO3 VENOUS: 30.4 MMOL/L (ref 23–29)
HCO3 VENOUS: 41.7 MMOL/L (ref 23–29)
HCT VFR BLD CALC: 24.9 % (ref 40.5–52.5)
HCT VFR BLD CALC: 25 % (ref 40.5–52.5)
HCT VFR BLD CALC: 25.2 % (ref 40.5–52.5)
HCT VFR BLD CALC: 25.4 % (ref 40.5–52.5)
HCT VFR BLD CALC: 25.5 % (ref 40.5–52.5)
HCT VFR BLD CALC: 25.9 % (ref 40.5–52.5)
HCT VFR BLD CALC: 26 % (ref 40.5–52.5)
HCT VFR BLD CALC: 26.5 % (ref 40.5–52.5)
HCT VFR BLD CALC: 26.9 % (ref 40.5–52.5)
HCT VFR BLD CALC: 27.1 % (ref 40.5–52.5)
HCT VFR BLD CALC: 28.7 % (ref 40.5–52.5)
HCT VFR BLD CALC: 30.9 % (ref 40.5–52.5)
HEMOGLOBIN, ART, EXTENDED: 11 G/DL (ref 13.5–17.5)
HEMOGLOBIN, ART, EXTENDED: 8.7 G/DL (ref 13.5–17.5)
HEMOGLOBIN: 7.9 G/DL (ref 13.5–17.5)
HEMOGLOBIN: 8 G/DL (ref 13.5–17.5)
HEMOGLOBIN: 8.2 G/DL (ref 13.5–17.5)
HEMOGLOBIN: 8.2 G/DL (ref 13.5–17.5)
HEMOGLOBIN: 8.5 G/DL (ref 13.5–17.5)
HEMOGLOBIN: 8.6 G/DL (ref 13.5–17.5)
HEMOGLOBIN: 8.9 G/DL (ref 13.5–17.5)
HEMOGLOBIN: 9.9 G/DL (ref 13.5–17.5)
HUMAN METAPNEUMOVIRUS PCR: NOT DETECTED
INFLUENZA A: NOT DETECTED
INFLUENZA B: NOT DETECTED
INR BLD: 1.26 (ref 0.88–1.12)
INR BLD: 1.44 (ref 0.88–1.12)
KETONES, URINE: NEGATIVE MG/DL
LACTIC ACID, SEPSIS: 2.3 MMOL/L (ref 0.4–1.9)
LACTIC ACID: 1.2 MMOL/L (ref 0.4–2)
LEUKOCYTE ESTERASE, URINE: NEGATIVE
LV EF: 55 %
LVEF MODALITY: NORMAL
LYMPHOCYTES ABSOLUTE: 0.2 K/UL (ref 1–5.1)
LYMPHOCYTES ABSOLUTE: 0.4 K/UL (ref 1–5.1)
LYMPHOCYTES ABSOLUTE: 0.4 K/UL (ref 1–5.1)
LYMPHOCYTES RELATIVE PERCENT: 1.2 %
LYMPHOCYTES RELATIVE PERCENT: 2.7 %
LYMPHOCYTES RELATIVE PERCENT: 3.4 %
MCH RBC QN AUTO: 27.3 PG (ref 26–34)
MCH RBC QN AUTO: 27.7 PG (ref 26–34)
MCH RBC QN AUTO: 28.1 PG (ref 26–34)
MCH RBC QN AUTO: 28.4 PG (ref 26–34)
MCH RBC QN AUTO: 28.6 PG (ref 26–34)
MCH RBC QN AUTO: 28.6 PG (ref 26–34)
MCH RBC QN AUTO: 28.8 PG (ref 26–34)
MCH RBC QN AUTO: 28.9 PG (ref 26–34)
MCH RBC QN AUTO: 29 PG (ref 26–34)
MCH RBC QN AUTO: 29.1 PG (ref 26–34)
MCH RBC QN AUTO: 29.3 PG (ref 26–34)
MCH RBC QN AUTO: 29.4 PG (ref 26–34)
MCHC RBC AUTO-ENTMCNC: 30.4 G/DL (ref 31–36)
MCHC RBC AUTO-ENTMCNC: 30.7 G/DL (ref 31–36)
MCHC RBC AUTO-ENTMCNC: 30.9 G/DL (ref 31–36)
MCHC RBC AUTO-ENTMCNC: 31.1 G/DL (ref 31–36)
MCHC RBC AUTO-ENTMCNC: 31.5 G/DL (ref 31–36)
MCHC RBC AUTO-ENTMCNC: 31.6 G/DL (ref 31–36)
MCHC RBC AUTO-ENTMCNC: 31.6 G/DL (ref 31–36)
MCHC RBC AUTO-ENTMCNC: 31.7 G/DL (ref 31–36)
MCHC RBC AUTO-ENTMCNC: 31.9 G/DL (ref 31–36)
MCHC RBC AUTO-ENTMCNC: 31.9 G/DL (ref 31–36)
MCHC RBC AUTO-ENTMCNC: 32.1 G/DL (ref 31–36)
MCHC RBC AUTO-ENTMCNC: 32.3 G/DL (ref 31–36)
MCV RBC AUTO: 89 FL (ref 80–100)
MCV RBC AUTO: 89.6 FL (ref 80–100)
MCV RBC AUTO: 90 FL (ref 80–100)
MCV RBC AUTO: 90.2 FL (ref 80–100)
MCV RBC AUTO: 90.8 FL (ref 80–100)
MCV RBC AUTO: 91.1 FL (ref 80–100)
MCV RBC AUTO: 91.2 FL (ref 80–100)
MCV RBC AUTO: 91.4 FL (ref 80–100)
MCV RBC AUTO: 91.5 FL (ref 80–100)
MCV RBC AUTO: 91.6 FL (ref 80–100)
MCV RBC AUTO: 91.7 FL (ref 80–100)
MCV RBC AUTO: 92.3 FL (ref 80–100)
METHEMOGLOBIN ARTERIAL: 0.3 %
METHEMOGLOBIN ARTERIAL: 0.4 %
METHEMOGLOBIN VENOUS: 0.3 %
METHEMOGLOBIN VENOUS: 0.6 %
MICROSCOPIC EXAMINATION: NORMAL
MONOCYTES ABSOLUTE: 0.5 K/UL (ref 0–1.3)
MONOCYTES ABSOLUTE: 0.6 K/UL (ref 0–1.3)
MONOCYTES ABSOLUTE: 0.6 K/UL (ref 0–1.3)
MONOCYTES RELATIVE PERCENT: 2.5 %
MONOCYTES RELATIVE PERCENT: 3.9 %
MONOCYTES RELATIVE PERCENT: 4.6 %
NEUTROPHILS ABSOLUTE: 11.3 K/UL (ref 1.7–7.7)
NEUTROPHILS ABSOLUTE: 15.3 K/UL (ref 1.7–7.7)
NEUTROPHILS ABSOLUTE: 19 K/UL (ref 1.7–7.7)
NEUTROPHILS RELATIVE PERCENT: 91.9 %
NEUTROPHILS RELATIVE PERCENT: 93 %
NEUTROPHILS RELATIVE PERCENT: 96.2 %
NITRITE, URINE: NEGATIVE
O2 SAT, ARTERIAL: 95.1 %
O2 SAT, ARTERIAL: 97.4 %
O2 SAT, VEN: 50 %
O2 SAT, VEN: 76 %
O2 THERAPY: ABNORMAL
ORGANISM: ABNORMAL
PARAINFLUENZA 1 PCR: NOT DETECTED
PARAINFLUENZA 2 PCR: NOT DETECTED
PARAINFLUENZA 3 PCR: NOT DETECTED
PARAINFLUENZA 4 PCR: NOT DETECTED
PCO2 ARTERIAL: 72 MMHG (ref 35–45)
PCO2 ARTERIAL: 76.1 MMHG (ref 35–45)
PCO2, VEN: 57 MMHG (ref 40–50)
PCO2, VEN: 64.6 MMHG (ref 40–50)
PDW BLD-RTO: 17.1 % (ref 12.4–15.4)
PDW BLD-RTO: 17.4 % (ref 12.4–15.4)
PDW BLD-RTO: 17.4 % (ref 12.4–15.4)
PDW BLD-RTO: 17.7 % (ref 12.4–15.4)
PDW BLD-RTO: 17.8 % (ref 12.4–15.4)
PDW BLD-RTO: 17.9 % (ref 12.4–15.4)
PDW BLD-RTO: 18 % (ref 12.4–15.4)
PDW BLD-RTO: 18 % (ref 12.4–15.4)
PDW BLD-RTO: 18.1 % (ref 12.4–15.4)
PDW BLD-RTO: 18.3 % (ref 12.4–15.4)
PDW BLD-RTO: 21.1 % (ref 12.4–15.4)
PDW BLD-RTO: 21.5 % (ref 12.4–15.4)
PERFORMED ON: ABNORMAL
PH ARTERIAL: 7.33 (ref 7.35–7.45)
PH ARTERIAL: 7.34 (ref 7.35–7.45)
PH UA: 5.5 (ref 5–8)
PH VENOUS: 7.34 (ref 7.35–7.45)
PH VENOUS: 7.43 (ref 7.35–7.45)
PLATELET # BLD: 352 K/UL (ref 135–450)
PLATELET # BLD: 363 K/UL (ref 135–450)
PLATELET # BLD: 372 K/UL (ref 135–450)
PLATELET # BLD: 376 K/UL (ref 135–450)
PLATELET # BLD: 376 K/UL (ref 135–450)
PLATELET # BLD: 383 K/UL (ref 135–450)
PLATELET # BLD: 387 K/UL (ref 135–450)
PLATELET # BLD: 396 K/UL (ref 135–450)
PLATELET # BLD: 399 K/UL (ref 135–450)
PLATELET # BLD: 404 K/UL (ref 135–450)
PLATELET # BLD: 473 K/UL (ref 135–450)
PLATELET # BLD: 516 K/UL (ref 135–450)
PMV BLD AUTO: 6.4 FL (ref 5–10.5)
PMV BLD AUTO: 6.6 FL (ref 5–10.5)
PMV BLD AUTO: 6.7 FL (ref 5–10.5)
PMV BLD AUTO: 6.8 FL (ref 5–10.5)
PMV BLD AUTO: 6.9 FL (ref 5–10.5)
PMV BLD AUTO: 7 FL (ref 5–10.5)
PMV BLD AUTO: 7.1 FL (ref 5–10.5)
PMV BLD AUTO: 7.2 FL (ref 5–10.5)
PO2 ARTERIAL: 110.7 MMHG (ref 75–108)
PO2 ARTERIAL: 89.2 MMHG (ref 75–108)
PO2, VEN: 29.3 MMHG (ref 25–40)
PO2, VEN: 44 MMHG (ref 25–40)
POTASSIUM REFLEX MAGNESIUM: 3.7 MMOL/L (ref 3.5–5.1)
POTASSIUM REFLEX MAGNESIUM: 4.2 MMOL/L (ref 3.5–5.1)
POTASSIUM REFLEX MAGNESIUM: 4.2 MMOL/L (ref 3.5–5.1)
POTASSIUM REFLEX MAGNESIUM: 4.4 MMOL/L (ref 3.5–5.1)
POTASSIUM REFLEX MAGNESIUM: 4.5 MMOL/L (ref 3.5–5.1)
POTASSIUM REFLEX MAGNESIUM: 4.7 MMOL/L (ref 3.5–5.1)
POTASSIUM REFLEX MAGNESIUM: 4.9 MMOL/L (ref 3.5–5.1)
POTASSIUM REFLEX MAGNESIUM: 5 MMOL/L (ref 3.5–5.1)
POTASSIUM REFLEX MAGNESIUM: 5 MMOL/L (ref 3.5–5.1)
POTASSIUM REFLEX MAGNESIUM: 5.6 MMOL/L (ref 3.5–5.1)
POTASSIUM REFLEX MAGNESIUM: 5.6 MMOL/L (ref 3.5–5.1)
POTASSIUM SERPL-SCNC: 4.5 MMOL/L (ref 3.5–5.1)
POTASSIUM SERPL-SCNC: 5.4 MMOL/L (ref 3.5–5.1)
PRO-BNP: 2276 PG/ML (ref 0–449)
PRO-BNP: 2280 PG/ML (ref 0–449)
PRO-BNP: 2568 PG/ML (ref 0–449)
PROCALCITONIN: 0.18 NG/ML (ref 0–0.15)
PROCALCITONIN: 0.18 NG/ML (ref 0–0.15)
PROCALCITONIN: 0.28 NG/ML (ref 0–0.15)
PROCALCITONIN: 0.35 NG/ML (ref 0–0.15)
PROTEIN UA: NEGATIVE MG/DL
PROTHROMBIN TIME: 14.4 SEC (ref 9.9–12.7)
PROTHROMBIN TIME: 16.5 SEC (ref 9.9–12.7)
RBC # BLD: 2.74 M/UL (ref 4.2–5.9)
RBC # BLD: 2.76 M/UL (ref 4.2–5.9)
RBC # BLD: 2.77 M/UL (ref 4.2–5.9)
RBC # BLD: 2.78 M/UL (ref 4.2–5.9)
RBC # BLD: 2.8 M/UL (ref 4.2–5.9)
RBC # BLD: 2.83 M/UL (ref 4.2–5.9)
RBC # BLD: 2.92 M/UL (ref 4.2–5.9)
RBC # BLD: 2.93 M/UL (ref 4.2–5.9)
RBC # BLD: 2.93 M/UL (ref 4.2–5.9)
RBC # BLD: 2.94 M/UL (ref 4.2–5.9)
RBC # BLD: 3.2 M/UL (ref 4.2–5.9)
RBC # BLD: 3.38 M/UL (ref 4.2–5.9)
REASON FOR REJECTION: NORMAL
REJECTED TEST: NORMAL
RHINO/ENTEROVIRUS PCR: NOT DETECTED
RSV BY PCR: NOT DETECTED
RSV SOURCE: NORMAL
SARS-COV-2, NAAT: NOT DETECTED
SODIUM BLD-SCNC: 136 MMOL/L (ref 136–145)
SODIUM BLD-SCNC: 137 MMOL/L (ref 136–145)
SODIUM BLD-SCNC: 138 MMOL/L (ref 136–145)
SODIUM BLD-SCNC: 138 MMOL/L (ref 136–145)
SODIUM BLD-SCNC: 140 MMOL/L (ref 136–145)
SODIUM BLD-SCNC: 140 MMOL/L (ref 136–145)
SODIUM BLD-SCNC: 142 MMOL/L (ref 136–145)
SODIUM BLD-SCNC: 144 MMOL/L (ref 136–145)
SODIUM BLD-SCNC: 147 MMOL/L (ref 136–145)
SPECIFIC GRAVITY UA: 1.02 (ref 1–1.03)
TCO2 ARTERIAL: 38.9 MMOL/L
TCO2 ARTERIAL: 42.1 MMOL/L
TCO2 CALC VENOUS: 32 MMOL/L
TCO2 CALC VENOUS: 44 MMOL/L
TOTAL PROTEIN: 6.8 G/DL (ref 6.4–8.2)
TOTAL PROTEIN: 8.3 G/DL (ref 6.4–8.2)
TROPONIN: <0.01 NG/ML
TROPONIN: <0.01 NG/ML
TSH REFLEX: 0.38 UIU/ML (ref 0.27–4.2)
URINE REFLEX TO CULTURE: NORMAL
URINE TYPE: NORMAL
UROBILINOGEN, URINE: 0.2 E.U./DL
WBC # BLD: 10.5 K/UL (ref 4–11)
WBC # BLD: 11 K/UL (ref 4–11)
WBC # BLD: 11.3 K/UL (ref 4–11)
WBC # BLD: 11.7 K/UL (ref 4–11)
WBC # BLD: 12.3 K/UL (ref 4–11)
WBC # BLD: 13.3 K/UL (ref 4–11)
WBC # BLD: 16.5 K/UL (ref 4–11)
WBC # BLD: 19.8 K/UL (ref 4–11)
WBC # BLD: 7.6 K/UL (ref 4–11)
WBC # BLD: 9.6 K/UL (ref 4–11)
WBC # BLD: 9.6 K/UL (ref 4–11)
WBC # BLD: 9.9 K/UL (ref 4–11)

## 2022-01-01 PROCEDURE — 94761 N-INVAS EAR/PLS OXIMETRY MLT: CPT

## 2022-01-01 PROCEDURE — 6370000000 HC RX 637 (ALT 250 FOR IP): Performed by: INTERNAL MEDICINE

## 2022-01-01 PROCEDURE — 99233 SBSQ HOSP IP/OBS HIGH 50: CPT | Performed by: INTERNAL MEDICINE

## 2022-01-01 PROCEDURE — 6370000000 HC RX 637 (ALT 250 FOR IP): Performed by: NURSE PRACTITIONER

## 2022-01-01 PROCEDURE — 6360000002 HC RX W HCPCS: Performed by: REGISTERED NURSE

## 2022-01-01 PROCEDURE — 36415 COLL VENOUS BLD VENIPUNCTURE: CPT

## 2022-01-01 PROCEDURE — 94640 AIRWAY INHALATION TREATMENT: CPT

## 2022-01-01 PROCEDURE — 87102 FUNGUS ISOLATION CULTURE: CPT

## 2022-01-01 PROCEDURE — 93306 TTE W/DOPPLER COMPLETE: CPT

## 2022-01-01 PROCEDURE — 2580000003 HC RX 258: Performed by: NURSE PRACTITIONER

## 2022-01-01 PROCEDURE — 94669 MECHANICAL CHEST WALL OSCILL: CPT

## 2022-01-01 PROCEDURE — 6360000002 HC RX W HCPCS: Performed by: NURSE PRACTITIONER

## 2022-01-01 PROCEDURE — 7100000010 HC PHASE II RECOVERY - FIRST 15 MIN: Performed by: INTERNAL MEDICINE

## 2022-01-01 PROCEDURE — 2700000000 HC OXYGEN THERAPY PER DAY

## 2022-01-01 PROCEDURE — 82803 BLOOD GASES ANY COMBINATION: CPT

## 2022-01-01 PROCEDURE — 99232 SBSQ HOSP IP/OBS MODERATE 35: CPT | Performed by: INTERNAL MEDICINE

## 2022-01-01 PROCEDURE — 6360000002 HC RX W HCPCS: Performed by: INTERNAL MEDICINE

## 2022-01-01 PROCEDURE — 1200000000 HC SEMI PRIVATE

## 2022-01-01 PROCEDURE — 2580000003 HC RX 258: Performed by: INTERNAL MEDICINE

## 2022-01-01 PROCEDURE — 99222 1ST HOSP IP/OBS MODERATE 55: CPT | Performed by: INTERNAL MEDICINE

## 2022-01-01 PROCEDURE — 99152 MOD SED SAME PHYS/QHP 5/>YRS: CPT | Performed by: INTERNAL MEDICINE

## 2022-01-01 PROCEDURE — 2060000000 HC ICU INTERMEDIATE R&B

## 2022-01-01 PROCEDURE — 83880 ASSAY OF NATRIURETIC PEPTIDE: CPT

## 2022-01-01 PROCEDURE — 84484 ASSAY OF TROPONIN QUANT: CPT

## 2022-01-01 PROCEDURE — 80053 COMPREHEN METABOLIC PANEL: CPT

## 2022-01-01 PROCEDURE — 85610 PROTHROMBIN TIME: CPT

## 2022-01-01 PROCEDURE — 6360000004 HC RX CONTRAST MEDICATION: Performed by: EMERGENCY MEDICINE

## 2022-01-01 PROCEDURE — 76937 US GUIDE VASCULAR ACCESS: CPT

## 2022-01-01 PROCEDURE — 94660 CPAP INITIATION&MGMT: CPT

## 2022-01-01 PROCEDURE — 99232 SBSQ HOSP IP/OBS MODERATE 35: CPT | Performed by: NURSE PRACTITIONER

## 2022-01-01 PROCEDURE — 96365 THER/PROPH/DIAG IV INF INIT: CPT

## 2022-01-01 PROCEDURE — 7100000011 HC PHASE II RECOVERY - ADDTL 15 MIN: Performed by: INTERNAL MEDICINE

## 2022-01-01 PROCEDURE — 97110 THERAPEUTIC EXERCISES: CPT

## 2022-01-01 PROCEDURE — 87070 CULTURE OTHR SPECIMN AEROBIC: CPT

## 2022-01-01 PROCEDURE — 97530 THERAPEUTIC ACTIVITIES: CPT

## 2022-01-01 PROCEDURE — 6370000000 HC RX 637 (ALT 250 FOR IP): Performed by: REGISTERED NURSE

## 2022-01-01 PROCEDURE — 84145 PROCALCITONIN (PCT): CPT

## 2022-01-01 PROCEDURE — 99284 EMERGENCY DEPT VISIT MOD MDM: CPT

## 2022-01-01 PROCEDURE — 88112 CYTOPATH CELL ENHANCE TECH: CPT

## 2022-01-01 PROCEDURE — 83605 ASSAY OF LACTIC ACID: CPT

## 2022-01-01 PROCEDURE — 71045 X-RAY EXAM CHEST 1 VIEW: CPT

## 2022-01-01 PROCEDURE — 99223 1ST HOSP IP/OBS HIGH 75: CPT | Performed by: INTERNAL MEDICINE

## 2022-01-01 PROCEDURE — 97116 GAIT TRAINING THERAPY: CPT

## 2022-01-01 PROCEDURE — 85027 COMPLETE CBC AUTOMATED: CPT

## 2022-01-01 PROCEDURE — 87635 SARS-COV-2 COVID-19 AMP PRB: CPT

## 2022-01-01 PROCEDURE — 85379 FIBRIN DEGRADATION QUANT: CPT

## 2022-01-01 PROCEDURE — 93010 ELECTROCARDIOGRAM REPORT: CPT | Performed by: INTERNAL MEDICINE

## 2022-01-01 PROCEDURE — 93005 ELECTROCARDIOGRAM TRACING: CPT | Performed by: EMERGENCY MEDICINE

## 2022-01-01 PROCEDURE — 36410 VNPNXR 3YR/> PHY/QHP DX/THER: CPT

## 2022-01-01 PROCEDURE — 85730 THROMBOPLASTIN TIME PARTIAL: CPT

## 2022-01-01 PROCEDURE — 36600 WITHDRAWAL OF ARTERIAL BLOOD: CPT

## 2022-01-01 PROCEDURE — 80048 BASIC METABOLIC PNL TOTAL CA: CPT

## 2022-01-01 PROCEDURE — 3609010900 HC BRONCHOSCOPY THERAPUTIC ASPIRATION INITIAL: Performed by: INTERNAL MEDICINE

## 2022-01-01 PROCEDURE — 71260 CT THORAX DX C+: CPT

## 2022-01-01 PROCEDURE — 2500000003 HC RX 250 WO HCPCS: Performed by: INTERNAL MEDICINE

## 2022-01-01 PROCEDURE — 05HB33Z INSERTION OF INFUSION DEVICE INTO RIGHT BASILIC VEIN, PERCUTANEOUS APPROACH: ICD-10-PCS | Performed by: INTERNAL MEDICINE

## 2022-01-01 PROCEDURE — 97535 SELF CARE MNGMENT TRAINING: CPT

## 2022-01-01 PROCEDURE — XW033N5 INTRODUCTION OF MEROPENEM-VABORBACTAM ANTI-INFECTIVE INTO PERIPHERAL VEIN, PERCUTANEOUS APPROACH, NEW TECHNOLOGY GROUP 5: ICD-10-PCS | Performed by: INTERNAL MEDICINE

## 2022-01-01 PROCEDURE — 93005 ELECTROCARDIOGRAM TRACING: CPT | Performed by: INTERNAL MEDICINE

## 2022-01-01 PROCEDURE — 0CCM8ZZ EXTIRPATION OF MATTER FROM PHARYNX, VIA NATURAL OR ARTIFICIAL OPENING ENDOSCOPIC: ICD-10-PCS | Performed by: INTERNAL MEDICINE

## 2022-01-01 PROCEDURE — 0B9F8ZX DRAINAGE OF RIGHT LOWER LUNG LOBE, VIA NATURAL OR ARTIFICIAL OPENING ENDOSCOPIC, DIAGNOSTIC: ICD-10-PCS | Performed by: INTERNAL MEDICINE

## 2022-01-01 PROCEDURE — 2580000003 HC RX 258: Performed by: EMERGENCY MEDICINE

## 2022-01-01 PROCEDURE — 97162 PT EVAL MOD COMPLEX 30 MIN: CPT

## 2022-01-01 PROCEDURE — 96375 TX/PRO/DX INJ NEW DRUG ADDON: CPT

## 2022-01-01 PROCEDURE — 85025 COMPLETE CBC W/AUTO DIFF WBC: CPT

## 2022-01-01 PROCEDURE — 87116 MYCOBACTERIA CULTURE: CPT

## 2022-01-01 PROCEDURE — 87205 SMEAR GRAM STAIN: CPT

## 2022-01-01 PROCEDURE — 31624 DX BRONCHOSCOPE/LAVAGE: CPT | Performed by: INTERNAL MEDICINE

## 2022-01-01 PROCEDURE — 6360000002 HC RX W HCPCS: Performed by: EMERGENCY MEDICINE

## 2022-01-01 PROCEDURE — 93005 ELECTROCARDIOGRAM TRACING: CPT | Performed by: NURSE PRACTITIONER

## 2022-01-01 PROCEDURE — A4217 STERILE WATER/SALINE, 500 ML: HCPCS | Performed by: INTERNAL MEDICINE

## 2022-01-01 PROCEDURE — 97167 OT EVAL HIGH COMPLEX 60 MIN: CPT

## 2022-01-01 PROCEDURE — 87186 SC STD MICRODIL/AGAR DIL: CPT

## 2022-01-01 PROCEDURE — 3609027000 HC BRONCHOSCOPY: Performed by: INTERNAL MEDICINE

## 2022-01-01 PROCEDURE — 96360 HYDRATION IV INFUSION INIT: CPT

## 2022-01-01 PROCEDURE — 2709999900 HC NON-CHARGEABLE SUPPLY: Performed by: INTERNAL MEDICINE

## 2022-01-01 PROCEDURE — 83036 HEMOGLOBIN GLYCOSYLATED A1C: CPT

## 2022-01-01 PROCEDURE — 88305 TISSUE EXAM BY PATHOLOGIST: CPT

## 2022-01-01 PROCEDURE — 87206 SMEAR FLUORESCENT/ACID STAI: CPT

## 2022-01-01 PROCEDURE — 84443 ASSAY THYROID STIM HORMONE: CPT

## 2022-01-01 PROCEDURE — 31645 BRNCHSC W/THER ASPIR 1ST: CPT | Performed by: INTERNAL MEDICINE

## 2022-01-01 PROCEDURE — 87015 SPECIMEN INFECT AGNT CONCNTJ: CPT

## 2022-01-01 PROCEDURE — 87077 CULTURE AEROBIC IDENTIFY: CPT

## 2022-01-01 PROCEDURE — 88312 SPECIAL STAINS GROUP 1: CPT

## 2022-01-01 PROCEDURE — 87632 RESP VIRUS 6-11 TARGETS: CPT

## 2022-01-01 PROCEDURE — 81003 URINALYSIS AUTO W/O SCOPE: CPT

## 2022-01-01 RX ORDER — DIPHENHYDRAMINE HCL 25 MG
50 TABLET ORAL NIGHTLY PRN
Status: DISCONTINUED | OUTPATIENT
Start: 2022-01-01 | End: 2022-01-01 | Stop reason: HOSPADM

## 2022-01-01 RX ORDER — FUROSEMIDE 10 MG/ML
20 INJECTION INTRAMUSCULAR; INTRAVENOUS ONCE
Status: CANCELLED | OUTPATIENT
Start: 2022-01-01 | End: 2022-01-01

## 2022-01-01 RX ORDER — GUAIFENESIN 600 MG/1
600 TABLET, EXTENDED RELEASE ORAL 2 TIMES DAILY
Status: DISCONTINUED | OUTPATIENT
Start: 2022-01-01 | End: 2022-01-01 | Stop reason: HOSPADM

## 2022-01-01 RX ORDER — FLUTICASONE PROPIONATE 50 MCG
2 SPRAY, SUSPENSION (ML) NASAL DAILY
Status: DISCONTINUED | OUTPATIENT
Start: 2022-01-01 | End: 2022-01-01

## 2022-01-01 RX ORDER — AMIODARONE HYDROCHLORIDE 200 MG/1
200 TABLET ORAL DAILY
Status: DISCONTINUED | OUTPATIENT
Start: 2022-01-01 | End: 2022-01-01

## 2022-01-01 RX ORDER — ATROPINE SULFATE 10 MG/ML
1 SOLUTION/ DROPS OPHTHALMIC EVERY 4 HOURS PRN
Status: DISCONTINUED | OUTPATIENT
Start: 2022-01-01 | End: 2022-01-01 | Stop reason: HOSPADM

## 2022-01-01 RX ORDER — SODIUM CHLORIDE 9 MG/ML
25 INJECTION, SOLUTION INTRAVENOUS PRN
Status: DISCONTINUED | OUTPATIENT
Start: 2022-01-01 | End: 2022-01-01 | Stop reason: HOSPADM

## 2022-01-01 RX ORDER — ACETAMINOPHEN 325 MG/1
650 TABLET ORAL EVERY 6 HOURS PRN
Status: DISCONTINUED | OUTPATIENT
Start: 2022-01-01 | End: 2022-01-01 | Stop reason: HOSPADM

## 2022-01-01 RX ORDER — ALBUTEROL SULFATE 2.5 MG/3ML
2.5 SOLUTION RESPIRATORY (INHALATION) EVERY 6 HOURS PRN
Status: DISCONTINUED | OUTPATIENT
Start: 2022-01-01 | End: 2022-01-01 | Stop reason: HOSPADM

## 2022-01-01 RX ORDER — MORPHINE SULFATE 2 MG/ML
2 INJECTION, SOLUTION INTRAMUSCULAR; INTRAVENOUS
Status: DISCONTINUED | OUTPATIENT
Start: 2022-01-01 | End: 2022-01-01

## 2022-01-01 RX ORDER — FERROUS SULFATE 325(65) MG
325 TABLET ORAL 2 TIMES DAILY
Status: DISCONTINUED | OUTPATIENT
Start: 2022-01-01 | End: 2022-01-01 | Stop reason: HOSPADM

## 2022-01-01 RX ORDER — POLYETHYLENE GLYCOL 3350 17 G/17G
17 POWDER, FOR SOLUTION ORAL DAILY PRN
Status: DISCONTINUED | OUTPATIENT
Start: 2022-01-01 | End: 2022-01-01 | Stop reason: HOSPADM

## 2022-01-01 RX ORDER — BUDESONIDE AND FORMOTEROL FUMARATE DIHYDRATE 160; 4.5 UG/1; UG/1
2 AEROSOL RESPIRATORY (INHALATION) 2 TIMES DAILY
Status: DISCONTINUED | OUTPATIENT
Start: 2022-01-01 | End: 2022-01-01

## 2022-01-01 RX ORDER — LEVOFLOXACIN 5 MG/ML
500 INJECTION, SOLUTION INTRAVENOUS EVERY 24 HOURS
Status: DISCONTINUED | OUTPATIENT
Start: 2022-01-01 | End: 2022-01-01

## 2022-01-01 RX ORDER — MORPHINE SULFATE 4 MG/ML
4 INJECTION, SOLUTION INTRAMUSCULAR; INTRAVENOUS DAILY PRN
Status: DISCONTINUED | OUTPATIENT
Start: 2022-01-01 | End: 2022-01-01 | Stop reason: HOSPADM

## 2022-01-01 RX ORDER — METOPROLOL TARTRATE 50 MG/1
50 TABLET, FILM COATED ORAL 2 TIMES DAILY
Status: DISCONTINUED | OUTPATIENT
Start: 2022-01-01 | End: 2022-01-01 | Stop reason: HOSPADM

## 2022-01-01 RX ORDER — DILTIAZEM HYDROCHLORIDE 120 MG/1
120 CAPSULE, COATED, EXTENDED RELEASE ORAL 2 TIMES DAILY
Qty: 60 CAPSULE | Refills: 0 | Status: ON HOLD | OUTPATIENT
Start: 2022-01-01 | End: 2022-01-01 | Stop reason: HOSPADM

## 2022-01-01 RX ORDER — ALBUTEROL SULFATE 2.5 MG/3ML
2.5 SOLUTION RESPIRATORY (INHALATION)
Status: DISCONTINUED | OUTPATIENT
Start: 2022-01-01 | End: 2022-01-01

## 2022-01-01 RX ORDER — LORAZEPAM 2 MG/ML
1 INJECTION INTRAMUSCULAR
Status: DISCONTINUED | OUTPATIENT
Start: 2022-01-01 | End: 2022-01-01 | Stop reason: HOSPADM

## 2022-01-01 RX ORDER — ONDANSETRON 4 MG/1
4 TABLET, ORALLY DISINTEGRATING ORAL EVERY 8 HOURS PRN
Status: DISCONTINUED | OUTPATIENT
Start: 2022-01-01 | End: 2022-01-01 | Stop reason: HOSPADM

## 2022-01-01 RX ORDER — 0.9 % SODIUM CHLORIDE 0.9 %
30 INTRAVENOUS SOLUTION INTRAVENOUS ONCE
Status: COMPLETED | OUTPATIENT
Start: 2022-01-01 | End: 2022-01-01

## 2022-01-01 RX ORDER — SODIUM CHLORIDE 0.9 % (FLUSH) 0.9 %
5-40 SYRINGE (ML) INJECTION PRN
Status: DISCONTINUED | OUTPATIENT
Start: 2022-01-01 | End: 2022-01-01 | Stop reason: HOSPADM

## 2022-01-01 RX ORDER — FLUOXETINE HYDROCHLORIDE 20 MG/1
20 CAPSULE ORAL DAILY
Status: ON HOLD | COMMUNITY
End: 2022-01-01 | Stop reason: HOSPADM

## 2022-01-01 RX ORDER — MORPHINE SULFATE 2 MG/ML
2 INJECTION, SOLUTION INTRAMUSCULAR; INTRAVENOUS
Status: DISCONTINUED | OUTPATIENT
Start: 2022-01-01 | End: 2022-01-01 | Stop reason: HOSPADM

## 2022-01-01 RX ORDER — FUROSEMIDE 10 MG/ML
20 INJECTION INTRAMUSCULAR; INTRAVENOUS ONCE
Status: COMPLETED | OUTPATIENT
Start: 2022-01-01 | End: 2022-01-01

## 2022-01-01 RX ORDER — ACETYLCYSTEINE 200 MG/ML
SOLUTION ORAL; RESPIRATORY (INHALATION) PRN
Status: DISCONTINUED | OUTPATIENT
Start: 2022-01-01 | End: 2022-01-01 | Stop reason: HOSPADM

## 2022-01-01 RX ORDER — NICOTINE POLACRILEX 4 MG
15 LOZENGE BUCCAL PRN
Status: DISCONTINUED | OUTPATIENT
Start: 2022-01-01 | End: 2022-01-01 | Stop reason: HOSPADM

## 2022-01-01 RX ORDER — MAGNESIUM HYDROXIDE 1200 MG/15ML
LIQUID ORAL CONTINUOUS PRN
Status: COMPLETED | OUTPATIENT
Start: 2022-01-01 | End: 2022-01-01

## 2022-01-01 RX ORDER — LORAZEPAM 2 MG/ML
0.5 INJECTION INTRAMUSCULAR ONCE
Status: COMPLETED | OUTPATIENT
Start: 2022-01-01 | End: 2022-01-01

## 2022-01-01 RX ORDER — IPRATROPIUM BROMIDE AND ALBUTEROL SULFATE 2.5; .5 MG/3ML; MG/3ML
1 SOLUTION RESPIRATORY (INHALATION)
Status: DISCONTINUED | OUTPATIENT
Start: 2022-01-01 | End: 2022-01-01

## 2022-01-01 RX ORDER — DILTIAZEM HYDROCHLORIDE 120 MG/1
120 CAPSULE, COATED, EXTENDED RELEASE ORAL 2 TIMES DAILY
Status: DISCONTINUED | OUTPATIENT
Start: 2022-01-01 | End: 2022-01-01

## 2022-01-01 RX ORDER — ASPIRIN 81 MG/1
81 TABLET, CHEWABLE ORAL DAILY
Status: DISCONTINUED | OUTPATIENT
Start: 2022-01-01 | End: 2022-01-01

## 2022-01-01 RX ORDER — HYDROXYZINE HYDROCHLORIDE 10 MG/1
10 TABLET, FILM COATED ORAL 3 TIMES DAILY PRN
Qty: 90 TABLET | Refills: 0 | Status: ON HOLD | OUTPATIENT
Start: 2022-01-01 | End: 2022-01-01 | Stop reason: HOSPADM

## 2022-01-01 RX ORDER — LORAZEPAM 2 MG/ML
1 INJECTION INTRAMUSCULAR EVERY 4 HOURS PRN
Status: DISCONTINUED | OUTPATIENT
Start: 2022-01-01 | End: 2022-01-01

## 2022-01-01 RX ORDER — SODIUM CHLORIDE 9 MG/ML
INJECTION, SOLUTION INTRAVENOUS CONTINUOUS
Status: CANCELLED | OUTPATIENT
Start: 2022-01-01

## 2022-01-01 RX ORDER — MECOBALAMIN 5000 MCG
5 TABLET,DISINTEGRATING ORAL NIGHTLY
Status: DISCONTINUED | OUTPATIENT
Start: 2022-01-01 | End: 2022-01-01 | Stop reason: HOSPADM

## 2022-01-01 RX ORDER — DEXTROSE MONOHYDRATE 50 MG/ML
100 INJECTION, SOLUTION INTRAVENOUS PRN
Status: DISCONTINUED | OUTPATIENT
Start: 2022-01-01 | End: 2022-01-01 | Stop reason: HOSPADM

## 2022-01-01 RX ORDER — ATORVASTATIN CALCIUM 40 MG/1
40 TABLET, FILM COATED ORAL DAILY
Status: DISCONTINUED | OUTPATIENT
Start: 2022-01-01 | End: 2022-01-01 | Stop reason: HOSPADM

## 2022-01-01 RX ORDER — FLUOXETINE 10 MG/1
10 CAPSULE ORAL DAILY
Status: DISCONTINUED | OUTPATIENT
Start: 2022-01-01 | End: 2022-01-01

## 2022-01-01 RX ORDER — BUDESONIDE AND FORMOTEROL FUMARATE DIHYDRATE 160; 4.5 UG/1; UG/1
1 AEROSOL RESPIRATORY (INHALATION) 2 TIMES DAILY
Status: DISCONTINUED | OUTPATIENT
Start: 2022-01-01 | End: 2022-01-01 | Stop reason: HOSPADM

## 2022-01-01 RX ORDER — PREDNISONE 20 MG/1
40 TABLET ORAL DAILY
Status: DISCONTINUED | OUTPATIENT
Start: 2022-01-01 | End: 2022-01-01 | Stop reason: HOSPADM

## 2022-01-01 RX ORDER — SIMVASTATIN 40 MG
80 TABLET ORAL NIGHTLY
Status: DISCONTINUED | OUTPATIENT
Start: 2022-01-01 | End: 2022-01-01

## 2022-01-01 RX ORDER — IPRATROPIUM BROMIDE AND ALBUTEROL SULFATE 2.5; .5 MG/3ML; MG/3ML
1 SOLUTION RESPIRATORY (INHALATION) EVERY 4 HOURS
Status: DISCONTINUED | OUTPATIENT
Start: 2022-01-01 | End: 2022-01-01

## 2022-01-01 RX ORDER — MIDAZOLAM HYDROCHLORIDE 5 MG/ML
INJECTION INTRAMUSCULAR; INTRAVENOUS PRN
Status: DISCONTINUED | OUTPATIENT
Start: 2022-01-01 | End: 2022-01-01 | Stop reason: HOSPADM

## 2022-01-01 RX ORDER — ACETAMINOPHEN 650 MG/1
650 SUPPOSITORY RECTAL EVERY 6 HOURS PRN
Status: DISCONTINUED | OUTPATIENT
Start: 2022-01-01 | End: 2022-01-01 | Stop reason: HOSPADM

## 2022-01-01 RX ORDER — ONDANSETRON 2 MG/ML
4 INJECTION INTRAMUSCULAR; INTRAVENOUS EVERY 6 HOURS PRN
Status: DISCONTINUED | OUTPATIENT
Start: 2022-01-01 | End: 2022-01-01 | Stop reason: HOSPADM

## 2022-01-01 RX ORDER — HYDROXYZINE HYDROCHLORIDE 10 MG/1
10 TABLET, FILM COATED ORAL 3 TIMES DAILY PRN
Status: DISCONTINUED | OUTPATIENT
Start: 2022-01-01 | End: 2022-01-01 | Stop reason: HOSPADM

## 2022-01-01 RX ORDER — SODIUM CHLORIDE 0.9 % (FLUSH) 0.9 %
5-40 SYRINGE (ML) INJECTION EVERY 12 HOURS SCHEDULED
Status: DISCONTINUED | OUTPATIENT
Start: 2022-01-01 | End: 2022-01-01 | Stop reason: HOSPADM

## 2022-01-01 RX ORDER — 0.9 % SODIUM CHLORIDE 0.9 %
1000 INTRAVENOUS SOLUTION INTRAVENOUS ONCE
Status: COMPLETED | OUTPATIENT
Start: 2022-01-01 | End: 2022-01-01

## 2022-01-01 RX ORDER — MORPHINE SULFATE 4 MG/ML
4 INJECTION, SOLUTION INTRAMUSCULAR; INTRAVENOUS
Status: DISCONTINUED | OUTPATIENT
Start: 2022-01-01 | End: 2022-01-01 | Stop reason: HOSPADM

## 2022-01-01 RX ORDER — ATORVASTATIN CALCIUM 40 MG/1
40 TABLET, FILM COATED ORAL DAILY
Status: DISCONTINUED | OUTPATIENT
Start: 2022-01-01 | End: 2022-01-01

## 2022-01-01 RX ORDER — MORPHINE SULFATE 4 MG/ML
4 INJECTION, SOLUTION INTRAMUSCULAR; INTRAVENOUS
Status: DISCONTINUED | OUTPATIENT
Start: 2022-01-01 | End: 2022-01-01

## 2022-01-01 RX ORDER — LIDOCAINE HYDROCHLORIDE 20 MG/ML
INJECTION, SOLUTION EPIDURAL; INFILTRATION; INTRACAUDAL; PERINEURAL PRN
Status: DISCONTINUED | OUTPATIENT
Start: 2022-01-01 | End: 2022-01-01 | Stop reason: HOSPADM

## 2022-01-01 RX ORDER — DILTIAZEM HYDROCHLORIDE 120 MG/1
120 CAPSULE, COATED, EXTENDED RELEASE ORAL DAILY
Status: DISCONTINUED | OUTPATIENT
Start: 2022-01-01 | End: 2022-01-01

## 2022-01-01 RX ORDER — FERROUS SULFATE 325(65) MG
325 TABLET ORAL 2 TIMES DAILY
Status: DISCONTINUED | OUTPATIENT
Start: 2022-01-01 | End: 2022-01-01

## 2022-01-01 RX ORDER — SODIUM CHLORIDE 9 MG/ML
INJECTION, SOLUTION INTRAVENOUS CONTINUOUS
Status: DISCONTINUED | OUTPATIENT
Start: 2022-01-01 | End: 2022-01-01

## 2022-01-01 RX ORDER — FENTANYL CITRATE 50 UG/ML
INJECTION, SOLUTION INTRAMUSCULAR; INTRAVENOUS PRN
Status: DISCONTINUED | OUTPATIENT
Start: 2022-01-01 | End: 2022-01-01 | Stop reason: HOSPADM

## 2022-01-01 RX ORDER — FLUOXETINE 10 MG/1
10 CAPSULE ORAL DAILY
Status: DISCONTINUED | OUTPATIENT
Start: 2022-01-01 | End: 2022-01-01 | Stop reason: HOSPADM

## 2022-01-01 RX ORDER — MORPHINE SULFATE 2 MG/ML
2 INJECTION, SOLUTION INTRAMUSCULAR; INTRAVENOUS ONCE
Status: COMPLETED | OUTPATIENT
Start: 2022-01-01 | End: 2022-01-01

## 2022-01-01 RX ORDER — ASPIRIN 81 MG/1
81 TABLET, CHEWABLE ORAL DAILY
Status: DISCONTINUED | OUTPATIENT
Start: 2022-01-01 | End: 2022-01-01 | Stop reason: HOSPADM

## 2022-01-01 RX ORDER — METHYLPREDNISOLONE SODIUM SUCCINATE 40 MG/ML
40 INJECTION, POWDER, LYOPHILIZED, FOR SOLUTION INTRAMUSCULAR; INTRAVENOUS EVERY 12 HOURS
Status: DISCONTINUED | OUTPATIENT
Start: 2022-01-01 | End: 2022-01-01

## 2022-01-01 RX ORDER — DILTIAZEM HYDROCHLORIDE 120 MG/1
120 CAPSULE, COATED, EXTENDED RELEASE ORAL 2 TIMES DAILY
Status: DISCONTINUED | OUTPATIENT
Start: 2022-01-01 | End: 2022-01-01 | Stop reason: HOSPADM

## 2022-01-01 RX ORDER — DOCUSATE SODIUM 100 MG/1
100 CAPSULE, LIQUID FILLED ORAL 2 TIMES DAILY PRN
Status: DISCONTINUED | OUTPATIENT
Start: 2022-01-01 | End: 2022-01-01 | Stop reason: HOSPADM

## 2022-01-01 RX ADMIN — GUAIFENESIN 600 MG: 600 TABLET, EXTENDED RELEASE ORAL at 09:35

## 2022-01-01 RX ADMIN — LORAZEPAM 1 MG: 2 INJECTION INTRAMUSCULAR; INTRAVENOUS at 22:23

## 2022-01-01 RX ADMIN — SODIUM CHLORIDE, PRESERVATIVE FREE 10 ML: 5 INJECTION INTRAVENOUS at 08:13

## 2022-01-01 RX ADMIN — FERROUS SULFATE TAB 325 MG (65 MG ELEMENTAL FE) 325 MG: 325 (65 FE) TAB at 20:05

## 2022-01-01 RX ADMIN — Medication 1 PUFF: at 20:07

## 2022-01-01 RX ADMIN — FLUOXETINE 10 MG: 10 CAPSULE ORAL at 09:15

## 2022-01-01 RX ADMIN — ENOXAPARIN SODIUM 40 MG: 40 INJECTION SUBCUTANEOUS at 09:26

## 2022-01-01 RX ADMIN — MORPHINE SULFATE 4 MG: 4 INJECTION INTRAVENOUS at 22:23

## 2022-01-01 RX ADMIN — DILTIAZEM HYDROCHLORIDE 120 MG: 120 CAPSULE, COATED, EXTENDED RELEASE ORAL at 21:01

## 2022-01-01 RX ADMIN — ATROPINE SULFATE 1 DROP: 10 SOLUTION/ DROPS OPHTHALMIC at 20:06

## 2022-01-01 RX ADMIN — METHYLPREDNISOLONE SODIUM SUCCINATE 40 MG: 40 INJECTION, POWDER, LYOPHILIZED, FOR SOLUTION INTRAMUSCULAR; INTRAVENOUS at 00:45

## 2022-01-01 RX ADMIN — FERROUS SULFATE TAB 325 MG (65 MG ELEMENTAL FE) 325 MG: 325 (65 FE) TAB at 22:22

## 2022-01-01 RX ADMIN — MORPHINE SULFATE 4 MG: 4 INJECTION INTRAVENOUS at 04:19

## 2022-01-01 RX ADMIN — METOPROLOL 50 MG: 50 TABLET ORAL at 09:15

## 2022-01-01 RX ADMIN — FERROUS SULFATE TAB 325 MG (65 MG ELEMENTAL FE) 325 MG: 325 (65 FE) TAB at 21:59

## 2022-01-01 RX ADMIN — METOPROLOL 50 MG: 50 TABLET ORAL at 08:31

## 2022-01-01 RX ADMIN — ASPIRIN 81 MG 81 MG: 81 TABLET ORAL at 09:26

## 2022-01-01 RX ADMIN — DILTIAZEM HYDROCHLORIDE 120 MG: 120 CAPSULE, COATED, EXTENDED RELEASE ORAL at 08:13

## 2022-01-01 RX ADMIN — MEROPENEM 1000 MG: 1 INJECTION, POWDER, FOR SOLUTION INTRAVENOUS at 09:11

## 2022-01-01 RX ADMIN — DILTIAZEM HYDROCHLORIDE 120 MG: 120 CAPSULE, COATED, EXTENDED RELEASE ORAL at 21:04

## 2022-01-01 RX ADMIN — SODIUM CHLORIDE, PRESERVATIVE FREE 10 ML: 5 INJECTION INTRAVENOUS at 20:18

## 2022-01-01 RX ADMIN — IPRATROPIUM BROMIDE AND ALBUTEROL SULFATE 1 AMPULE: .5; 2.5 SOLUTION RESPIRATORY (INHALATION) at 07:54

## 2022-01-01 RX ADMIN — ASPIRIN 81 MG 81 MG: 81 TABLET ORAL at 08:20

## 2022-01-01 RX ADMIN — ATORVASTATIN CALCIUM 40 MG: 40 TABLET, FILM COATED ORAL at 09:16

## 2022-01-01 RX ADMIN — FERROUS SULFATE TAB 325 MG (65 MG ELEMENTAL FE) 325 MG: 325 (65 FE) TAB at 21:17

## 2022-01-01 RX ADMIN — ACETAMINOPHEN 650 MG: 325 TABLET ORAL at 22:09

## 2022-01-01 RX ADMIN — ACETAMINOPHEN 650 MG: 325 TABLET ORAL at 05:01

## 2022-01-01 RX ADMIN — Medication 5 MG: at 20:06

## 2022-01-01 RX ADMIN — ENOXAPARIN SODIUM 40 MG: 40 INJECTION SUBCUTANEOUS at 22:22

## 2022-01-01 RX ADMIN — HYDROXYZINE HYDROCHLORIDE 10 MG: 10 TABLET ORAL at 11:47

## 2022-01-01 RX ADMIN — Medication 1 PUFF: at 07:55

## 2022-01-01 RX ADMIN — METOPROLOL 50 MG: 50 TABLET ORAL at 20:18

## 2022-01-01 RX ADMIN — SODIUM CHLORIDE, PRESERVATIVE FREE 10 ML: 5 INJECTION INTRAVENOUS at 21:37

## 2022-01-01 RX ADMIN — ATROPINE SULFATE 1 DROP: 10 SOLUTION/ DROPS OPHTHALMIC at 00:29

## 2022-01-01 RX ADMIN — HYDROXYZINE HYDROCHLORIDE 10 MG: 10 TABLET ORAL at 14:04

## 2022-01-01 RX ADMIN — SODIUM CHLORIDE: 9 INJECTION, SOLUTION INTRAVENOUS at 22:40

## 2022-01-01 RX ADMIN — SODIUM CHLORIDE, PRESERVATIVE FREE 10 ML: 5 INJECTION INTRAVENOUS at 21:18

## 2022-01-01 RX ADMIN — SODIUM CHLORIDE, PRESERVATIVE FREE 10 ML: 5 INJECTION INTRAVENOUS at 09:21

## 2022-01-01 RX ADMIN — ATORVASTATIN CALCIUM 40 MG: 40 TABLET, FILM COATED ORAL at 11:01

## 2022-01-01 RX ADMIN — METOPROLOL 50 MG: 50 TABLET ORAL at 20:06

## 2022-01-01 RX ADMIN — LORAZEPAM 0.5 MG: 2 INJECTION INTRAMUSCULAR; INTRAVENOUS at 22:39

## 2022-01-01 RX ADMIN — DILTIAZEM HYDROCHLORIDE 120 MG: 120 CAPSULE, COATED, EXTENDED RELEASE ORAL at 09:35

## 2022-01-01 RX ADMIN — Medication 5 MG: at 23:42

## 2022-01-01 RX ADMIN — ACETAMINOPHEN 650 MG: 325 TABLET ORAL at 05:04

## 2022-01-01 RX ADMIN — LORAZEPAM 1 MG: 2 INJECTION INTRAMUSCULAR; INTRAVENOUS at 01:36

## 2022-01-01 RX ADMIN — SODIUM CHLORIDE, PRESERVATIVE FREE 10 ML: 5 INJECTION INTRAVENOUS at 20:11

## 2022-01-01 RX ADMIN — MORPHINE SULFATE 4 MG: 4 INJECTION INTRAVENOUS at 00:34

## 2022-01-01 RX ADMIN — MORPHINE SULFATE 4 MG: 4 INJECTION INTRAVENOUS at 18:04

## 2022-01-01 RX ADMIN — ENOXAPARIN SODIUM 40 MG: 40 INJECTION SUBCUTANEOUS at 08:20

## 2022-01-01 RX ADMIN — ACETAMINOPHEN 650 MG: 325 TABLET ORAL at 21:59

## 2022-01-01 RX ADMIN — Medication 1 PUFF: at 19:40

## 2022-01-01 RX ADMIN — METOPROLOL 50 MG: 50 TABLET ORAL at 08:20

## 2022-01-01 RX ADMIN — MEROPENEM 1000 MG: 1 INJECTION, POWDER, FOR SOLUTION INTRAVENOUS at 09:00

## 2022-01-01 RX ADMIN — DILTIAZEM HYDROCHLORIDE 120 MG: 120 CAPSULE, COATED, EXTENDED RELEASE ORAL at 22:22

## 2022-01-01 RX ADMIN — FERROUS SULFATE TAB 325 MG (65 MG ELEMENTAL FE) 325 MG: 325 (65 FE) TAB at 22:38

## 2022-01-01 RX ADMIN — SODIUM CHLORIDE, PRESERVATIVE FREE 10 ML: 5 INJECTION INTRAVENOUS at 09:39

## 2022-01-01 RX ADMIN — ENOXAPARIN SODIUM 40 MG: 40 INJECTION SUBCUTANEOUS at 12:16

## 2022-01-01 RX ADMIN — METHYLPREDNISOLONE SODIUM SUCCINATE 40 MG: 40 INJECTION, POWDER, LYOPHILIZED, FOR SOLUTION INTRAMUSCULAR; INTRAVENOUS at 23:32

## 2022-01-01 RX ADMIN — MEROPENEM 1000 MG: 1 INJECTION, POWDER, FOR SOLUTION INTRAVENOUS at 00:07

## 2022-01-01 RX ADMIN — ENOXAPARIN SODIUM 40 MG: 40 INJECTION SUBCUTANEOUS at 08:37

## 2022-01-01 RX ADMIN — LORAZEPAM 1 MG: 2 INJECTION INTRAMUSCULAR; INTRAVENOUS at 04:19

## 2022-01-01 RX ADMIN — MEROPENEM 1000 MG: 1 INJECTION, POWDER, FOR SOLUTION INTRAVENOUS at 23:45

## 2022-01-01 RX ADMIN — ENOXAPARIN SODIUM 40 MG: 40 INJECTION SUBCUTANEOUS at 09:34

## 2022-01-01 RX ADMIN — METOPROLOL 50 MG: 50 TABLET ORAL at 08:13

## 2022-01-01 RX ADMIN — DILTIAZEM HYDROCHLORIDE 120 MG: 120 CAPSULE, COATED, EXTENDED RELEASE ORAL at 20:12

## 2022-01-01 RX ADMIN — FLUOXETINE 10 MG: 10 CAPSULE ORAL at 09:26

## 2022-01-01 RX ADMIN — ALBUTEROL SULFATE 2.5 MG: 2.5 SOLUTION RESPIRATORY (INHALATION) at 11:45

## 2022-01-01 RX ADMIN — SODIUM CHLORIDE, PRESERVATIVE FREE 10 ML: 5 INJECTION INTRAVENOUS at 22:23

## 2022-01-01 RX ADMIN — METHYLPREDNISOLONE SODIUM SUCCINATE 40 MG: 40 INJECTION, POWDER, LYOPHILIZED, FOR SOLUTION INTRAMUSCULAR; INTRAVENOUS at 12:22

## 2022-01-01 RX ADMIN — DILTIAZEM HYDROCHLORIDE 120 MG: 120 CAPSULE, COATED, EXTENDED RELEASE ORAL at 09:15

## 2022-01-01 RX ADMIN — Medication 5 MG: at 21:04

## 2022-01-01 RX ADMIN — ACETAMINOPHEN 650 MG: 325 TABLET ORAL at 10:03

## 2022-01-01 RX ADMIN — DIPHENHYDRAMINE HCL 50 MG: 25 TABLET ORAL at 23:42

## 2022-01-01 RX ADMIN — HYDROXYZINE HYDROCHLORIDE 10 MG: 10 TABLET ORAL at 01:25

## 2022-01-01 RX ADMIN — ALBUTEROL SULFATE 2.5 MG: 2.5 SOLUTION RESPIRATORY (INHALATION) at 00:01

## 2022-01-01 RX ADMIN — MEROPENEM 1000 MG: 1 INJECTION, POWDER, FOR SOLUTION INTRAVENOUS at 09:28

## 2022-01-01 RX ADMIN — ATORVASTATIN CALCIUM 40 MG: 40 TABLET, FILM COATED ORAL at 09:26

## 2022-01-01 RX ADMIN — Medication 1 PUFF: at 19:53

## 2022-01-01 RX ADMIN — MEROPENEM 1000 MG: 1 INJECTION, POWDER, FOR SOLUTION INTRAVENOUS at 19:42

## 2022-01-01 RX ADMIN — MEROPENEM 1000 MG: 1 INJECTION, POWDER, FOR SOLUTION INTRAVENOUS at 18:45

## 2022-01-01 RX ADMIN — ACETAMINOPHEN 650 MG: 325 TABLET ORAL at 23:42

## 2022-01-01 RX ADMIN — METHYLPREDNISOLONE SODIUM SUCCINATE 40 MG: 40 INJECTION, POWDER, LYOPHILIZED, FOR SOLUTION INTRAMUSCULAR; INTRAVENOUS at 12:15

## 2022-01-01 RX ADMIN — METOPROLOL 50 MG: 50 TABLET ORAL at 22:38

## 2022-01-01 RX ADMIN — GUAIFENESIN 600 MG: 600 TABLET, EXTENDED RELEASE ORAL at 09:38

## 2022-01-01 RX ADMIN — METOPROLOL 50 MG: 50 TABLET ORAL at 21:04

## 2022-01-01 RX ADMIN — GUAIFENESIN 600 MG: 600 TABLET, EXTENDED RELEASE ORAL at 20:05

## 2022-01-01 RX ADMIN — SODIUM CHLORIDE, PRESERVATIVE FREE 10 ML: 5 INJECTION INTRAVENOUS at 20:03

## 2022-01-01 RX ADMIN — HYDROXYZINE HYDROCHLORIDE 10 MG: 10 TABLET ORAL at 08:20

## 2022-01-01 RX ADMIN — METOPROLOL 50 MG: 50 TABLET ORAL at 08:37

## 2022-01-01 RX ADMIN — SODIUM CHLORIDE 25 ML: 9 INJECTION, SOLUTION INTRAVENOUS at 17:19

## 2022-01-01 RX ADMIN — METOPROLOL 50 MG: 50 TABLET ORAL at 21:17

## 2022-01-01 RX ADMIN — METOPROLOL 50 MG: 50 TABLET ORAL at 09:26

## 2022-01-01 RX ADMIN — FLUOXETINE 10 MG: 10 CAPSULE ORAL at 09:35

## 2022-01-01 RX ADMIN — FERROUS SULFATE TAB 325 MG (65 MG ELEMENTAL FE) 325 MG: 325 (65 FE) TAB at 11:00

## 2022-01-01 RX ADMIN — FERROUS SULFATE TAB 325 MG (65 MG ELEMENTAL FE) 325 MG: 325 (65 FE) TAB at 09:02

## 2022-01-01 RX ADMIN — Medication 5 MG: at 20:18

## 2022-01-01 RX ADMIN — DILTIAZEM HYDROCHLORIDE 120 MG: 120 CAPSULE, COATED, EXTENDED RELEASE ORAL at 09:38

## 2022-01-01 RX ADMIN — MEROPENEM 1000 MG: 1 INJECTION, POWDER, FOR SOLUTION INTRAVENOUS at 00:22

## 2022-01-01 RX ADMIN — ASPIRIN 81 MG 81 MG: 81 TABLET ORAL at 11:01

## 2022-01-01 RX ADMIN — ACETAMINOPHEN 650 MG: 325 TABLET, FILM COATED ORAL at 22:22

## 2022-01-01 RX ADMIN — METOPROLOL 50 MG: 50 TABLET ORAL at 09:38

## 2022-01-01 RX ADMIN — FUROSEMIDE 20 MG: 10 INJECTION, SOLUTION INTRAMUSCULAR; INTRAVENOUS at 22:39

## 2022-01-01 RX ADMIN — HYDROXYZINE HYDROCHLORIDE 10 MG: 10 TABLET ORAL at 04:29

## 2022-01-01 RX ADMIN — DILTIAZEM HYDROCHLORIDE 120 MG: 120 CAPSULE, COATED, EXTENDED RELEASE ORAL at 09:26

## 2022-01-01 RX ADMIN — ACETAMINOPHEN 650 MG: 325 TABLET ORAL at 08:19

## 2022-01-01 RX ADMIN — ACETAMINOPHEN 650 MG: 325 TABLET, FILM COATED ORAL at 05:34

## 2022-01-01 RX ADMIN — AMIODARONE HYDROCHLORIDE 200 MG: 200 TABLET ORAL at 08:31

## 2022-01-01 RX ADMIN — ATORVASTATIN CALCIUM 40 MG: 40 TABLET, FILM COATED ORAL at 09:34

## 2022-01-01 RX ADMIN — ACETAMINOPHEN 650 MG: 325 TABLET ORAL at 23:54

## 2022-01-01 RX ADMIN — MEROPENEM 1000 MG: 1 INJECTION, POWDER, FOR SOLUTION INTRAVENOUS at 09:37

## 2022-01-01 RX ADMIN — METHYLPREDNISOLONE SODIUM SUCCINATE 40 MG: 40 INJECTION, POWDER, LYOPHILIZED, FOR SOLUTION INTRAMUSCULAR; INTRAVENOUS at 12:19

## 2022-01-01 RX ADMIN — GUAIFENESIN 600 MG: 600 TABLET, EXTENDED RELEASE ORAL at 21:01

## 2022-01-01 RX ADMIN — MEROPENEM 1000 MG: 1 INJECTION, POWDER, FOR SOLUTION INTRAVENOUS at 18:26

## 2022-01-01 RX ADMIN — GUAIFENESIN 600 MG: 600 TABLET, EXTENDED RELEASE ORAL at 16:59

## 2022-01-01 RX ADMIN — GUAIFENESIN 600 MG: 600 TABLET, EXTENDED RELEASE ORAL at 23:32

## 2022-01-01 RX ADMIN — METOPROLOL 50 MG: 50 TABLET ORAL at 20:12

## 2022-01-01 RX ADMIN — ASPIRIN 81 MG 81 MG: 81 TABLET ORAL at 09:16

## 2022-01-01 RX ADMIN — METOPROLOL 50 MG: 50 TABLET ORAL at 11:00

## 2022-01-01 RX ADMIN — METHYLPREDNISOLONE SODIUM SUCCINATE 40 MG: 40 INJECTION, POWDER, LYOPHILIZED, FOR SOLUTION INTRAMUSCULAR; INTRAVENOUS at 11:05

## 2022-01-01 RX ADMIN — MEROPENEM 1000 MG: 1 INJECTION, POWDER, FOR SOLUTION INTRAVENOUS at 02:22

## 2022-01-01 RX ADMIN — Medication 1 PUFF: at 08:36

## 2022-01-01 RX ADMIN — ASPIRIN 81 MG 81 MG: 81 TABLET ORAL at 09:01

## 2022-01-01 RX ADMIN — HYDROXYZINE HYDROCHLORIDE 10 MG: 10 TABLET ORAL at 21:01

## 2022-01-01 RX ADMIN — DILTIAZEM HYDROCHLORIDE 120 MG: 120 CAPSULE, COATED, EXTENDED RELEASE ORAL at 12:16

## 2022-01-01 RX ADMIN — METHYLPREDNISOLONE SODIUM SUCCINATE 40 MG: 40 INJECTION, POWDER, LYOPHILIZED, FOR SOLUTION INTRAMUSCULAR; INTRAVENOUS at 11:47

## 2022-01-01 RX ADMIN — MEROPENEM 1000 MG: 1 INJECTION, POWDER, FOR SOLUTION INTRAVENOUS at 17:29

## 2022-01-01 RX ADMIN — SODIUM CHLORIDE 25 ML: 9 INJECTION, SOLUTION INTRAVENOUS at 00:01

## 2022-01-01 RX ADMIN — METOPROLOL 50 MG: 50 TABLET ORAL at 20:08

## 2022-01-01 RX ADMIN — DILTIAZEM HYDROCHLORIDE 120 MG: 120 CAPSULE, COATED, EXTENDED RELEASE ORAL at 09:00

## 2022-01-01 RX ADMIN — ACETAMINOPHEN 650 MG: 325 TABLET ORAL at 14:56

## 2022-01-01 RX ADMIN — GUAIFENESIN 600 MG: 600 TABLET, EXTENDED RELEASE ORAL at 09:26

## 2022-01-01 RX ADMIN — Medication 1 PUFF: at 20:41

## 2022-01-01 RX ADMIN — SODIUM CHLORIDE, PRESERVATIVE FREE 10 ML: 5 INJECTION INTRAVENOUS at 09:26

## 2022-01-01 RX ADMIN — MEROPENEM 1000 MG: 1 INJECTION, POWDER, FOR SOLUTION INTRAVENOUS at 02:05

## 2022-01-01 RX ADMIN — Medication 1 PUFF: at 07:28

## 2022-01-01 RX ADMIN — Medication 5 MG: at 21:59

## 2022-01-01 RX ADMIN — FLUOXETINE 10 MG: 10 CAPSULE ORAL at 11:01

## 2022-01-01 RX ADMIN — MEROPENEM 1000 MG: 1 INJECTION, POWDER, FOR SOLUTION INTRAVENOUS at 00:11

## 2022-01-01 RX ADMIN — FERROUS SULFATE TAB 325 MG (65 MG ELEMENTAL FE) 325 MG: 325 (65 FE) TAB at 09:15

## 2022-01-01 RX ADMIN — ENOXAPARIN SODIUM 40 MG: 40 INJECTION SUBCUTANEOUS at 09:38

## 2022-01-01 RX ADMIN — LORAZEPAM 1 MG: 2 INJECTION INTRAMUSCULAR; INTRAVENOUS at 11:20

## 2022-01-01 RX ADMIN — MORPHINE SULFATE 4 MG: 4 INJECTION INTRAVENOUS at 03:10

## 2022-01-01 RX ADMIN — ATORVASTATIN CALCIUM 40 MG: 40 TABLET, FILM COATED ORAL at 08:13

## 2022-01-01 RX ADMIN — Medication 1 PUFF: at 20:28

## 2022-01-01 RX ADMIN — Medication 5 MG: at 23:31

## 2022-01-01 RX ADMIN — SODIUM CHLORIDE, PRESERVATIVE FREE 10 ML: 5 INJECTION INTRAVENOUS at 20:00

## 2022-01-01 RX ADMIN — DILTIAZEM HYDROCHLORIDE 120 MG: 120 CAPSULE, COATED, EXTENDED RELEASE ORAL at 21:59

## 2022-01-01 RX ADMIN — MEROPENEM 1000 MG: 1 INJECTION, POWDER, FOR SOLUTION INTRAVENOUS at 17:20

## 2022-01-01 RX ADMIN — HYDROXYZINE HYDROCHLORIDE 10 MG: 10 TABLET ORAL at 09:26

## 2022-01-01 RX ADMIN — Medication 1 PUFF: at 00:01

## 2022-01-01 RX ADMIN — GUAIFENESIN 600 MG: 600 TABLET, EXTENDED RELEASE ORAL at 08:13

## 2022-01-01 RX ADMIN — Medication 5 MG: at 21:37

## 2022-01-01 RX ADMIN — FLUOXETINE 10 MG: 10 CAPSULE ORAL at 08:14

## 2022-01-01 RX ADMIN — DILTIAZEM HYDROCHLORIDE 120 MG: 120 CAPSULE, COATED, EXTENDED RELEASE ORAL at 20:18

## 2022-01-01 RX ADMIN — HYDROXYZINE HYDROCHLORIDE 10 MG: 10 TABLET ORAL at 05:16

## 2022-01-01 RX ADMIN — SODIUM CHLORIDE, PRESERVATIVE FREE 10 ML: 5 INJECTION INTRAVENOUS at 09:04

## 2022-01-01 RX ADMIN — Medication 1 PUFF: at 09:02

## 2022-01-01 RX ADMIN — METOPROLOL 50 MG: 50 TABLET ORAL at 09:01

## 2022-01-01 RX ADMIN — MEROPENEM 1000 MG: 1 INJECTION, POWDER, FOR SOLUTION INTRAVENOUS at 08:21

## 2022-01-01 RX ADMIN — DILTIAZEM HYDROCHLORIDE 120 MG: 120 CAPSULE, COATED, EXTENDED RELEASE ORAL at 20:05

## 2022-01-01 RX ADMIN — MEROPENEM 1000 MG: 1 INJECTION, POWDER, FOR SOLUTION INTRAVENOUS at 10:53

## 2022-01-01 RX ADMIN — LORAZEPAM 0.5 MG: 2 INJECTION INTRAMUSCULAR; INTRAVENOUS at 06:18

## 2022-01-01 RX ADMIN — FLUOXETINE 10 MG: 10 CAPSULE ORAL at 08:19

## 2022-01-01 RX ADMIN — METHYLPREDNISOLONE SODIUM SUCCINATE 40 MG: 40 INJECTION, POWDER, LYOPHILIZED, FOR SOLUTION INTRAMUSCULAR; INTRAVENOUS at 23:54

## 2022-01-01 RX ADMIN — ALBUTEROL SULFATE 2.5 MG: 2.5 SOLUTION RESPIRATORY (INHALATION) at 13:54

## 2022-01-01 RX ADMIN — MEROPENEM 1000 MG: 1 INJECTION, POWDER, FOR SOLUTION INTRAVENOUS at 10:00

## 2022-01-01 RX ADMIN — SODIUM CHLORIDE, PRESERVATIVE FREE 10 ML: 5 INJECTION INTRAVENOUS at 08:20

## 2022-01-01 RX ADMIN — Medication 5 MG: at 20:05

## 2022-01-01 RX ADMIN — FERROUS SULFATE TAB 325 MG (65 MG ELEMENTAL FE) 325 MG: 325 (65 FE) TAB at 09:38

## 2022-01-01 RX ADMIN — SODIUM CHLORIDE, PRESERVATIVE FREE 10 ML: 5 INJECTION INTRAVENOUS at 22:38

## 2022-01-01 RX ADMIN — DIPHENHYDRAMINE HCL 50 MG: 25 TABLET ORAL at 23:31

## 2022-01-01 RX ADMIN — FERROUS SULFATE TAB 325 MG (65 MG ELEMENTAL FE) 325 MG: 325 (65 FE) TAB at 20:12

## 2022-01-01 RX ADMIN — FERROUS SULFATE TAB 325 MG (65 MG ELEMENTAL FE) 325 MG: 325 (65 FE) TAB at 08:13

## 2022-01-01 RX ADMIN — VANCOMYCIN HYDROCHLORIDE 1000 MG: 1 INJECTION, POWDER, LYOPHILIZED, FOR SOLUTION INTRAVENOUS at 18:44

## 2022-01-01 RX ADMIN — MORPHINE SULFATE 4 MG: 4 INJECTION INTRAVENOUS at 15:56

## 2022-01-01 RX ADMIN — ALBUTEROL SULFATE 2.5 MG: 2.5 SOLUTION RESPIRATORY (INHALATION) at 19:43

## 2022-01-01 RX ADMIN — Medication 1 PUFF: at 08:37

## 2022-01-01 RX ADMIN — ATORVASTATIN CALCIUM 40 MG: 40 TABLET, FILM COATED ORAL at 09:00

## 2022-01-01 RX ADMIN — HYDROXYZINE HYDROCHLORIDE 10 MG: 10 TABLET ORAL at 05:28

## 2022-01-01 RX ADMIN — LORAZEPAM 1 MG: 2 INJECTION INTRAMUSCULAR; INTRAVENOUS at 20:03

## 2022-01-01 RX ADMIN — DIPHENHYDRAMINE HCL 50 MG: 25 TABLET ORAL at 21:04

## 2022-01-01 RX ADMIN — SODIUM CHLORIDE, PRESERVATIVE FREE 10 ML: 5 INJECTION INTRAVENOUS at 20:06

## 2022-01-01 RX ADMIN — DIPHENHYDRAMINE HCL 50 MG: 25 TABLET ORAL at 00:19

## 2022-01-01 RX ADMIN — FERROUS SULFATE TAB 325 MG (65 MG ELEMENTAL FE) 325 MG: 325 (65 FE) TAB at 08:37

## 2022-01-01 RX ADMIN — Medication 1 PUFF: at 20:03

## 2022-01-01 RX ADMIN — ALBUTEROL SULFATE 2.5 MG: 2.5 SOLUTION RESPIRATORY (INHALATION) at 08:04

## 2022-01-01 RX ADMIN — Medication 2 PUFF: at 20:43

## 2022-01-01 RX ADMIN — ENOXAPARIN SODIUM 40 MG: 40 INJECTION SUBCUTANEOUS at 11:01

## 2022-01-01 RX ADMIN — METOPROLOL 50 MG: 50 TABLET ORAL at 20:05

## 2022-01-01 RX ADMIN — ATROPINE SULFATE 1 DROP: 10 SOLUTION/ DROPS OPHTHALMIC at 11:19

## 2022-01-01 RX ADMIN — ALBUTEROL SULFATE 2.5 MG: 2.5 SOLUTION RESPIRATORY (INHALATION) at 18:04

## 2022-01-01 RX ADMIN — LORAZEPAM 0.5 MG: 2 INJECTION INTRAMUSCULAR; INTRAVENOUS at 22:55

## 2022-01-01 RX ADMIN — MEROPENEM 1000 MG: 1 INJECTION, POWDER, FOR SOLUTION INTRAVENOUS at 01:15

## 2022-01-01 RX ADMIN — ENOXAPARIN SODIUM 40 MG: 40 INJECTION SUBCUTANEOUS at 09:15

## 2022-01-01 RX ADMIN — LEVOFLOXACIN 500 MG: 5 INJECTION, SOLUTION INTRAVENOUS at 14:15

## 2022-01-01 RX ADMIN — Medication 1 PUFF: at 08:15

## 2022-01-01 RX ADMIN — MEROPENEM 1000 MG: 1 INJECTION, POWDER, FOR SOLUTION INTRAVENOUS at 00:02

## 2022-01-01 RX ADMIN — METHYLPREDNISOLONE SODIUM SUCCINATE 40 MG: 40 INJECTION, POWDER, LYOPHILIZED, FOR SOLUTION INTRAMUSCULAR; INTRAVENOUS at 12:16

## 2022-01-01 RX ADMIN — Medication 10 ML: at 01:52

## 2022-01-01 RX ADMIN — FLUOXETINE 10 MG: 10 CAPSULE ORAL at 09:38

## 2022-01-01 RX ADMIN — ACETAMINOPHEN 650 MG: 325 TABLET ORAL at 14:47

## 2022-01-01 RX ADMIN — SODIUM CHLORIDE, PRESERVATIVE FREE 10 ML: 5 INJECTION INTRAVENOUS at 22:00

## 2022-01-01 RX ADMIN — Medication 5 MG: at 00:19

## 2022-01-01 RX ADMIN — FLUOXETINE 10 MG: 10 CAPSULE ORAL at 08:37

## 2022-01-01 RX ADMIN — ACETAMINOPHEN 650 MG: 325 TABLET ORAL at 23:39

## 2022-01-01 RX ADMIN — IPRATROPIUM BROMIDE AND ALBUTEROL SULFATE 1 AMPULE: .5; 2.5 SOLUTION RESPIRATORY (INHALATION) at 20:44

## 2022-01-01 RX ADMIN — ATROPINE SULFATE 1 DROP: 10 SOLUTION/ DROPS OPHTHALMIC at 14:59

## 2022-01-01 RX ADMIN — HYDROXYZINE HYDROCHLORIDE 10 MG: 10 TABLET ORAL at 22:00

## 2022-01-01 RX ADMIN — MORPHINE SULFATE 4 MG: 4 INJECTION INTRAVENOUS at 01:36

## 2022-01-01 RX ADMIN — Medication 1 PUFF: at 08:51

## 2022-01-01 RX ADMIN — MEROPENEM 1000 MG: 1 INJECTION, POWDER, FOR SOLUTION INTRAVENOUS at 08:38

## 2022-01-01 RX ADMIN — POLYETHYLENE GLYCOL 3350 17 G: 17 POWDER, FOR SOLUTION ORAL at 08:30

## 2022-01-01 RX ADMIN — GUAIFENESIN 600 MG: 600 TABLET, EXTENDED RELEASE ORAL at 21:59

## 2022-01-01 RX ADMIN — SODIUM CHLORIDE, PRESERVATIVE FREE 10 ML: 5 INJECTION INTRAVENOUS at 10:20

## 2022-01-01 RX ADMIN — METHYLPREDNISOLONE SODIUM SUCCINATE 40 MG: 40 INJECTION, POWDER, LYOPHILIZED, FOR SOLUTION INTRAMUSCULAR; INTRAVENOUS at 00:19

## 2022-01-01 RX ADMIN — METOPROLOL 50 MG: 50 TABLET ORAL at 21:01

## 2022-01-01 RX ADMIN — MEROPENEM 1000 MG: 1 INJECTION, POWDER, FOR SOLUTION INTRAVENOUS at 17:03

## 2022-01-01 RX ADMIN — ENOXAPARIN SODIUM 40 MG: 40 INJECTION SUBCUTANEOUS at 08:30

## 2022-01-01 RX ADMIN — Medication 1 PUFF: at 19:41

## 2022-01-01 RX ADMIN — MORPHINE SULFATE 2 MG: 2 INJECTION, SOLUTION INTRAMUSCULAR; INTRAVENOUS at 10:32

## 2022-01-01 RX ADMIN — SODIUM CHLORIDE 25 ML: 9 INJECTION, SOLUTION INTRAVENOUS at 09:27

## 2022-01-01 RX ADMIN — METHYLPREDNISOLONE SODIUM SUCCINATE 40 MG: 40 INJECTION, POWDER, LYOPHILIZED, FOR SOLUTION INTRAMUSCULAR; INTRAVENOUS at 00:07

## 2022-01-01 RX ADMIN — ACETAMINOPHEN 650 MG: 325 TABLET ORAL at 21:17

## 2022-01-01 RX ADMIN — MEROPENEM 1000 MG: 1 INJECTION, POWDER, FOR SOLUTION INTRAVENOUS at 22:21

## 2022-01-01 RX ADMIN — ACETAMINOPHEN 650 MG: 325 TABLET ORAL at 15:13

## 2022-01-01 RX ADMIN — FERROUS SULFATE TAB 325 MG (65 MG ELEMENTAL FE) 325 MG: 325 (65 FE) TAB at 21:04

## 2022-01-01 RX ADMIN — METOPROLOL 50 MG: 50 TABLET ORAL at 21:59

## 2022-01-01 RX ADMIN — METOPROLOL 50 MG: 50 TABLET ORAL at 09:35

## 2022-01-01 RX ADMIN — ASPIRIN 81 MG 81 MG: 81 TABLET ORAL at 08:13

## 2022-01-01 RX ADMIN — SODIUM CHLORIDE, PRESERVATIVE FREE 10 ML: 5 INJECTION INTRAVENOUS at 21:07

## 2022-01-01 RX ADMIN — ASPIRIN 81 MG 81 MG: 81 TABLET ORAL at 09:35

## 2022-01-01 RX ADMIN — FLUOXETINE 10 MG: 10 CAPSULE ORAL at 09:00

## 2022-01-01 RX ADMIN — GUAIFENESIN 600 MG: 600 TABLET, EXTENDED RELEASE ORAL at 20:12

## 2022-01-01 RX ADMIN — SODIUM CHLORIDE, PRESERVATIVE FREE 10 ML: 5 INJECTION INTRAVENOUS at 09:31

## 2022-01-01 RX ADMIN — SODIUM CHLORIDE, PRESERVATIVE FREE 10 ML: 5 INJECTION INTRAVENOUS at 21:15

## 2022-01-01 RX ADMIN — MEROPENEM 1000 MG: 1 INJECTION, POWDER, FOR SOLUTION INTRAVENOUS at 18:32

## 2022-01-01 RX ADMIN — METHYLPREDNISOLONE SODIUM SUCCINATE 40 MG: 40 INJECTION, POWDER, LYOPHILIZED, FOR SOLUTION INTRAMUSCULAR; INTRAVENOUS at 00:06

## 2022-01-01 RX ADMIN — MEROPENEM 1000 MG: 1 INJECTION, POWDER, FOR SOLUTION INTRAVENOUS at 17:17

## 2022-01-01 RX ADMIN — MORPHINE SULFATE 4 MG: 4 INJECTION INTRAVENOUS at 13:00

## 2022-01-01 RX ADMIN — MORPHINE SULFATE 4 MG: 4 INJECTION INTRAVENOUS at 14:57

## 2022-01-01 RX ADMIN — FERROUS SULFATE TAB 325 MG (65 MG ELEMENTAL FE) 325 MG: 325 (65 FE) TAB at 20:06

## 2022-01-01 RX ADMIN — ACETAMINOPHEN 650 MG: 325 TABLET ORAL at 21:19

## 2022-01-01 RX ADMIN — ATORVASTATIN CALCIUM 40 MG: 40 TABLET, FILM COATED ORAL at 08:37

## 2022-01-01 RX ADMIN — MORPHINE SULFATE 4 MG: 4 INJECTION INTRAVENOUS at 20:02

## 2022-01-01 RX ADMIN — MORPHINE SULFATE 4 MG: 4 INJECTION INTRAVENOUS at 23:30

## 2022-01-01 RX ADMIN — MEROPENEM 1000 MG: 1 INJECTION, POWDER, FOR SOLUTION INTRAVENOUS at 11:03

## 2022-01-01 RX ADMIN — DIPHENHYDRAMINE HCL 50 MG: 25 TABLET ORAL at 23:54

## 2022-01-01 RX ADMIN — LORAZEPAM 1 MG: 2 INJECTION INTRAMUSCULAR; INTRAVENOUS at 23:30

## 2022-01-01 RX ADMIN — SODIUM CHLORIDE, PRESERVATIVE FREE 10 ML: 5 INJECTION INTRAVENOUS at 11:01

## 2022-01-01 RX ADMIN — MEROPENEM 1000 MG: 1 INJECTION, POWDER, FOR SOLUTION INTRAVENOUS at 09:27

## 2022-01-01 RX ADMIN — FERROUS SULFATE TAB 325 MG (65 MG ELEMENTAL FE) 325 MG: 325 (65 FE) TAB at 08:20

## 2022-01-01 RX ADMIN — MEROPENEM 1000 MG: 1 INJECTION, POWDER, FOR SOLUTION INTRAVENOUS at 05:36

## 2022-01-01 RX ADMIN — FERROUS SULFATE TAB 325 MG (65 MG ELEMENTAL FE) 325 MG: 325 (65 FE) TAB at 20:08

## 2022-01-01 RX ADMIN — HYDROXYZINE HYDROCHLORIDE 10 MG: 10 TABLET ORAL at 20:12

## 2022-01-01 RX ADMIN — ACETAMINOPHEN 650 MG: 325 TABLET ORAL at 21:04

## 2022-01-01 RX ADMIN — Medication 1 PUFF: at 19:36

## 2022-01-01 RX ADMIN — MEROPENEM 1000 MG: 1 INJECTION, POWDER, FOR SOLUTION INTRAVENOUS at 17:14

## 2022-01-01 RX ADMIN — Medication 1 PUFF: at 08:34

## 2022-01-01 RX ADMIN — SODIUM CHLORIDE 25 ML: 9 INJECTION, SOLUTION INTRAVENOUS at 08:59

## 2022-01-01 RX ADMIN — LORAZEPAM 1 MG: 2 INJECTION INTRAMUSCULAR; INTRAVENOUS at 16:11

## 2022-01-01 RX ADMIN — Medication 1 PUFF: at 08:33

## 2022-01-01 RX ADMIN — ATORVASTATIN CALCIUM 40 MG: 40 TABLET, FILM COATED ORAL at 08:32

## 2022-01-01 RX ADMIN — SODIUM CHLORIDE, PRESERVATIVE FREE 10 ML: 5 INJECTION INTRAVENOUS at 20:08

## 2022-01-01 RX ADMIN — ATORVASTATIN CALCIUM 40 MG: 40 TABLET, FILM COATED ORAL at 09:38

## 2022-01-01 RX ADMIN — SODIUM CHLORIDE 1000 ML: 9 INJECTION, SOLUTION INTRAVENOUS at 18:05

## 2022-01-01 RX ADMIN — PIPERACILLIN AND TAZOBACTAM 3375 MG: 3; .375 INJECTION, POWDER, LYOPHILIZED, FOR SOLUTION INTRAVENOUS at 18:39

## 2022-01-01 RX ADMIN — MORPHINE SULFATE 4 MG: 4 INJECTION INTRAVENOUS at 09:35

## 2022-01-01 RX ADMIN — LORAZEPAM 1 MG: 2 INJECTION INTRAMUSCULAR; INTRAVENOUS at 00:34

## 2022-01-01 RX ADMIN — DILTIAZEM HYDROCHLORIDE 120 MG: 120 CAPSULE, COATED, EXTENDED RELEASE ORAL at 20:08

## 2022-01-01 RX ADMIN — SODIUM CHLORIDE, PRESERVATIVE FREE 10 ML: 5 INJECTION INTRAVENOUS at 09:13

## 2022-01-01 RX ADMIN — ASPIRIN 81 MG 81 MG: 81 TABLET ORAL at 08:31

## 2022-01-01 RX ADMIN — FERROUS SULFATE TAB 325 MG (65 MG ELEMENTAL FE) 325 MG: 325 (65 FE) TAB at 21:01

## 2022-01-01 RX ADMIN — FERROUS SULFATE TAB 325 MG (65 MG ELEMENTAL FE) 325 MG: 325 (65 FE) TAB at 09:26

## 2022-01-01 RX ADMIN — ASPIRIN 81 MG 81 MG: 81 TABLET ORAL at 08:37

## 2022-01-01 RX ADMIN — ASPIRIN 81 MG 81 MG: 81 TABLET ORAL at 09:38

## 2022-01-01 RX ADMIN — DILTIAZEM HYDROCHLORIDE 120 MG: 120 CAPSULE, COATED, EXTENDED RELEASE ORAL at 08:37

## 2022-01-01 RX ADMIN — DILTIAZEM HYDROCHLORIDE 120 MG: 120 CAPSULE, COATED, EXTENDED RELEASE ORAL at 11:01

## 2022-01-01 RX ADMIN — LORAZEPAM 1 MG: 2 INJECTION INTRAMUSCULAR; INTRAVENOUS at 03:10

## 2022-01-01 RX ADMIN — DIPHENHYDRAMINE HCL 50 MG: 25 TABLET ORAL at 21:17

## 2022-01-01 RX ADMIN — ALBUTEROL SULFATE 2.5 MG: 2.5 SOLUTION RESPIRATORY (INHALATION) at 17:47

## 2022-01-01 RX ADMIN — FERROUS SULFATE TAB 325 MG (65 MG ELEMENTAL FE) 325 MG: 325 (65 FE) TAB at 21:37

## 2022-01-01 RX ADMIN — FERROUS SULFATE TAB 325 MG (65 MG ELEMENTAL FE) 325 MG: 325 (65 FE) TAB at 09:35

## 2022-01-01 RX ADMIN — SODIUM CHLORIDE 25 ML: 9 INJECTION, SOLUTION INTRAVENOUS at 00:11

## 2022-01-01 RX ADMIN — DILTIAZEM HYDROCHLORIDE 120 MG: 120 CAPSULE, COATED, EXTENDED RELEASE ORAL at 20:06

## 2022-01-01 RX ADMIN — FERROUS SULFATE TAB 325 MG (65 MG ELEMENTAL FE) 325 MG: 325 (65 FE) TAB at 20:18

## 2022-01-01 RX ADMIN — ALBUTEROL SULFATE 2.5 MG: 2.5 SOLUTION RESPIRATORY (INHALATION) at 07:55

## 2022-01-01 RX ADMIN — DILTIAZEM HYDROCHLORIDE 120 MG: 120 CAPSULE, COATED, EXTENDED RELEASE ORAL at 08:20

## 2022-01-01 RX ADMIN — Medication 2 PUFF: at 07:54

## 2022-01-01 RX ADMIN — FLUOXETINE 10 MG: 10 CAPSULE ORAL at 08:31

## 2022-01-01 RX ADMIN — Medication 1 PUFF: at 19:50

## 2022-01-01 RX ADMIN — ENOXAPARIN SODIUM 40 MG: 40 INJECTION SUBCUTANEOUS at 09:00

## 2022-01-01 RX ADMIN — DIPHENHYDRAMINE HCL 50 MG: 25 TABLET ORAL at 21:59

## 2022-01-01 RX ADMIN — SODIUM CHLORIDE 2250 ML: 9 INJECTION, SOLUTION INTRAVENOUS at 18:32

## 2022-01-01 RX ADMIN — LORAZEPAM 1 MG: 2 INJECTION INTRAMUSCULAR; INTRAVENOUS at 18:06

## 2022-01-01 RX ADMIN — METOPROLOL 50 MG: 50 TABLET ORAL at 21:37

## 2022-01-01 RX ADMIN — Medication 1 PUFF: at 20:15

## 2022-01-01 RX ADMIN — IPRATROPIUM BROMIDE AND ALBUTEROL SULFATE 1 AMPULE: .5; 2.5 SOLUTION RESPIRATORY (INHALATION) at 11:25

## 2022-01-01 RX ADMIN — ATORVASTATIN CALCIUM 40 MG: 40 TABLET, FILM COATED ORAL at 08:20

## 2022-01-01 RX ADMIN — ACETAMINOPHEN 650 MG: 325 TABLET ORAL at 20:12

## 2022-01-01 RX ADMIN — PREDNISONE 40 MG: 20 TABLET ORAL at 09:26

## 2022-01-01 RX ADMIN — IOPAMIDOL 75 ML: 755 INJECTION, SOLUTION INTRAVENOUS at 17:34

## 2022-01-01 RX ADMIN — MEROPENEM 1000 MG: 1 INJECTION, POWDER, FOR SOLUTION INTRAVENOUS at 01:11

## 2022-01-01 ASSESSMENT — PAIN SCALES - GENERAL
PAINLEVEL_OUTOF10: 2
PAINLEVEL_OUTOF10: 0
PAINLEVEL_OUTOF10: 4
PAINLEVEL_OUTOF10: 0
PAINLEVEL_OUTOF10: 1
PAINLEVEL_OUTOF10: 0
PAINLEVEL_OUTOF10: 4
PAINLEVEL_OUTOF10: 0
PAINLEVEL_OUTOF10: 0
PAINLEVEL_OUTOF10: 4
PAINLEVEL_OUTOF10: 0
PAINLEVEL_OUTOF10: 0
PAINLEVEL_OUTOF10: 6
PAINLEVEL_OUTOF10: 6
PAINLEVEL_OUTOF10: 0
PAINLEVEL_OUTOF10: 4
PAINLEVEL_OUTOF10: 0
PAINLEVEL_OUTOF10: 3
PAINLEVEL_OUTOF10: 2
PAINLEVEL_OUTOF10: 5
PAINLEVEL_OUTOF10: 3
PAINLEVEL_OUTOF10: 3
PAINLEVEL_OUTOF10: 7
PAINLEVEL_OUTOF10: 9
PAINLEVEL_OUTOF10: 0
PAINLEVEL_OUTOF10: 2
PAINLEVEL_OUTOF10: 0
PAINLEVEL_OUTOF10: 3
PAINLEVEL_OUTOF10: 0
PAINLEVEL_OUTOF10: 6
PAINLEVEL_OUTOF10: 0
PAINLEVEL_OUTOF10: 0
PAINLEVEL_OUTOF10: 9
PAINLEVEL_OUTOF10: 0
PAINLEVEL_OUTOF10: 0
PAINLEVEL_OUTOF10: 6
PAINLEVEL_OUTOF10: 6
PAINLEVEL_OUTOF10: 0
PAINLEVEL_OUTOF10: 4
PAINLEVEL_OUTOF10: 0
PAINLEVEL_OUTOF10: 6
PAINLEVEL_OUTOF10: 5
PAINLEVEL_OUTOF10: 3
PAINLEVEL_OUTOF10: 6
PAINLEVEL_OUTOF10: 4

## 2022-01-01 ASSESSMENT — PAIN DESCRIPTION - LOCATION
LOCATION: HEAD
LOCATION: SHOULDER
LOCATION: HEAD
LOCATION: HEAD
LOCATION: SHOULDER
LOCATION: BACK;SHOULDER
LOCATION: BACK
LOCATION: SHOULDER
LOCATION: HEAD
LOCATION: HEAD
LOCATION: SHOULDER

## 2022-01-01 ASSESSMENT — PAIN DESCRIPTION - DESCRIPTORS
DESCRIPTORS: HEADACHE
DESCRIPTORS: ACHING
DESCRIPTORS: HEADACHE
DESCRIPTORS: ACHING;CONSTANT
DESCRIPTORS: ACHING
DESCRIPTORS: HEADACHE

## 2022-01-01 ASSESSMENT — PAIN DESCRIPTION - PAIN TYPE
TYPE: ACUTE PAIN
TYPE: CHRONIC PAIN
TYPE: CHRONIC PAIN
TYPE: ACUTE PAIN
TYPE: CHRONIC PAIN
TYPE: CHRONIC PAIN

## 2022-01-01 ASSESSMENT — PAIN DESCRIPTION - ORIENTATION
ORIENTATION: LEFT;UPPER
ORIENTATION: LEFT
ORIENTATION: MID
ORIENTATION: LEFT;UPPER
ORIENTATION: LEFT

## 2022-01-01 ASSESSMENT — ENCOUNTER SYMPTOMS
TACHYPNEA: 1
TACHYPNEA: 1

## 2022-01-01 ASSESSMENT — PAIN DESCRIPTION - ONSET
ONSET: GRADUAL
ONSET: ON-GOING

## 2022-01-01 ASSESSMENT — PAIN - FUNCTIONAL ASSESSMENT
PAIN_FUNCTIONAL_ASSESSMENT: PREVENTS OR INTERFERES SOME ACTIVE ACTIVITIES AND ADLS
PAIN_FUNCTIONAL_ASSESSMENT: 0-10

## 2022-01-01 ASSESSMENT — PAIN DESCRIPTION - PROGRESSION
CLINICAL_PROGRESSION: GRADUALLY WORSENING
CLINICAL_PROGRESSION: GRADUALLY WORSENING

## 2022-01-01 ASSESSMENT — PAIN DESCRIPTION - FREQUENCY
FREQUENCY: CONTINUOUS

## 2022-01-22 PROBLEM — R00.0 TACHYCARDIA: Status: ACTIVE | Noted: 2022-01-01

## 2022-01-22 NOTE — ED NOTES
Pt came in today with increase WOB pt is non vacc and normally on 3lit of oxygen however has had to increase oxygen to 6 lit and pt state even with that he has SOB. Pt is awake alert pt with some decrease breath sounds in the bases. Pt also with a cough and upper congestion. Pt state state no fevers. Pt alert orient x4. Pt with monitor in place.       Ammon Nunez RN  01/22/22 7738

## 2022-01-22 NOTE — ED NOTES
Ambulated pt approx 20 feet on 6L/min while monitoring telemetry and pulse oximetry. Prior to ambulation, pt was resting comfortably with HR and SpO2 of 97% and 107 bpm. During ambulation, pt's SpO2 and HR were 94-96% and 128-132 bpm. Pt stated that he did feel SOB and weakness but no CP, dizziness or lightheadedness. After ambulation, pt was returned to bed SpO2 and HR were 98% and 111 bpm. EDILMA Seals made aware.         Ashley Dill  01/22/22 9049

## 2022-01-23 NOTE — ED PROVIDER NOTES
EMERGENCY DEPARTMENT ENCOUNTER      This patient was seen and evaluated by the attending physician. Pt Name: Suzanne Holguin  MRN: [id number]  Nahomygfjoanna 1945  Date of evaluation: 1/22/2022  Provider: TOAN Falk - CNP-C  PCP: Andre Michaels MD  ED Attending: Dr. Harlan Cintron    History provided by the patient. CHIEF COMPLAINT:     Chief Complaint   Patient presents with    Shortness of Breath     pt to ED with c/o SOB and weakness since November. pt denies any chest pain reports some cough. on 6 L O2 at home at baseline       HISTORY OF PRESENT ILLNESS:      Suzanne Holguin is a 67 y.o. male who presents to [facility] ER with complaints of shortness of breath. Patient states that he has had shortness of breath and tachycardia that is been going on since November, he does have a history of SVT as well as paroxysmal A. fib. Patient denies that he had any chest pain he does states that he lives alone and is getting harder for him to function because he gets up and moves around and his heart rate goes in the 150s and he gets very winded. Patient is requiring extra oxygen on top of his baseline oxygen, he is on 6 L here currently he only goes up to 5 at home. He is here for further evaluation    Location:-  Quality:-  Severity:-  Duration:-  Modifying factors:-    Nursing Notes were reviewed     REVIEW OF SYSTEMS:     Review of Systems  All systems, atotal of 10, are reviewed and negative except for those that were just noted in history present illness.         PAST MEDICAL HISTORY:     Past Medical History:   Diagnosis Date    Acute bronchitis     Adrenal adenoma 6/05    noted on CT, 2.2 x 1cm    Atrial fibrillation (HCC)     Atrial fibrillation ([address] 5/12/2010    Benign neoplasm of colon 5/17/2010    Calculus of kidney     Colonic polyps     benign    Diverticulitis     Diverticulitis of colon (without mention of hemorrhage)(562.11) 5/17/2010    Fatty liver     Hyperlipidemia     Kidney stone 6/05    Other and unspecified hyperlipidemia 5/17/2010    Other chronic nonalcoholic liver disease 9/86/3831    Palpitations 8/04    echo showed diastolic dysfunction, holter monitor ( + )  for brief runs of SVT    Renal cyst 11/07    bilateral (7cm on L and 3 cm on R), no adrenal mass    Steatosis, liver 5/22/2010    Tobacco use disorder 5/17/2010         SURGICAL HISTORY:      Past Surgical History:   Procedure Laterality Date    CARDIOVASCULAR STRESS TEST  10/07, 10/05    stress echo-normal, negative    COLONOSCOPY  7/08    polyps and diverticuli, tubular adenoma    DOPPLER ECHOCARDIOGRAPHY  6/09    moderate in crease LA, otherwise ( - )    KIDNEY STONE SURGERY  6/05    LIVER BIOPSY  3/07    fatty liver - no autoimmune hepatits         CURRENT MEDICATIONS:       Current Discharge Medication List      CONTINUE these medications which have NOT CHANGED    Details   ferrous sulfate (IRON 325) 325 (65 Fe) MG tablet Take 325 mg by mouth 2 times daily      FLUoxetine (PROZAC) 10 MG capsule Take 10 mg by mouth daily      rivaroxaban (XARELTO) 20 MG TABS tablet TAKE 1 TABLET BY MOUTH DAILY      ATROVENT HFA 17 MCG/ACT inhaler INHALE 1 PUFF INTO LUNGS THREE TIMES DAILY  Qty: 25.8 g, Refills: 2      Oral Medication Containers (MEDICATION CONTAINER/SMALL) MISC Take 5 mLs by mouth 4 times daily as needed.  Miles Mixture:  80 cc Viscous Lidocaine, 20 mg Hydrocortisone, 100 mg Doxycycline, 2 million units Nystatin, qs up to 180 cc with Benadryl elixir (cherry or grape)  Qty: 1 each, Refills: 0    Associated Diagnoses: Pharyngitis      amiodarone (CORDARONE) 200 MG tablet TAKE 1 TABLET BY MOUTH EVERY DAY  Qty: 30 tablet, Refills: 5      fluocinonide (LIDEX) 0.05 % ointment APPLY TO THE AFFECTED AREA TWICE DAILY  Qty: 15 g, Refills: 1      fluticasone (FLONASE) 50 MCG/ACT nasal spray USE 2 SPRAYS IN EACH NOSTRIL DAILY  Qty: 1 Bottle, Refills: 5      Pseudoephedrine-Ibuprofen  MG TABS Take 1 capsule by mouth 4 times daily as needed. Qty: 24 tablet, Refills: 0    Associated Diagnoses: Sinusitis      atorvastatin (LIPITOR) 40 MG tablet TAKE 1 TABLET BY MOUTH ONCE DAILY  Qty: 90 tablet, Refills: 3    Comments: **Patient requests 90 days supply**      Omega-3 Fatty Acids (OMEGA 3 PO) Take  by mouth. doxycycline (VIBRAMYCIN) 50 MG capsule Take 50 mg by mouth daily. Associated Diagnoses: Sinusitis      simvastatin (ZOCOR) 20 MG tablet TAKE 1 TABLET BY MOUTH EVERY NIGHT  Qty: 30 tablet, Refills: 0      furosemide (LASIX) 40 MG tablet Take 1 tablet by mouth daily for 10 days. Qty: 10 tablet, Refills: 0      docusate sodium 100 MG CAPS Take 100 mg by mouth 2 times daily as needed for Constipation. potassium chloride (POTASSIUM CHLORIDE) 10 MEQ tablet Take 1 tablet by mouth 3 times daily (with meals) for 10 days. Qty: 30 tablet      aspirin 81 MG chewable tablet Take 1 tablet by mouth daily. Qty: 30 tablet, Refills: 5      metoprolol (LOPRESSOR) 50 MG tablet Take 1 tablet by mouth 2 times daily. Hold this medication if Systolic blood pressure (top number) is less than 90 or if heart rate is less than 60 and call Dr. Gasper Roy office for further instructions. Ask for YOLIE HERNANDEZ [facility]. Call [phone]. Qty: 60 tablet      fluticasone-salmeterol (ADVAIR DISKUS) 250-50 MCG/DOSE AEPB Inhale 1 puff into the lungs every 12 hours. ALLERGIES:    Patient has no known allergies.     FAMILY HISTORY:       Family History   Problem Relation Age of Onset    Macular Degen Sister     Thyroid Disease Mother     Cancer Neg Hx     Diabetes Neg Hx     Heart Disease Neg Hx     Kidney Disease Neg Hx           SOCIAL HISTORY:       Social History     Socioeconomic History    Marital status: Single     Spouse name: None    Number of children: None    Years of education: None    Highest education level: None   Occupational History    None   Tobacco Use    Smoking status: Former Smoker     Packs/day: 1.00 Years: 52.00     Pack years: 52.00     Types: Cigarettes     Quit date: 3/29/2013     Years since quittin.8    Smokeless tobacco: Never Used    Tobacco comment: using chantix   Substance and Sexual Activity    Alcohol use: No    Drug use: None    Sexual activity: None   Other Topics Concern    None   Social History Narrative    None     Social Determinants of Health     Financial Resource Strain:     Difficulty of Paying Living Expenses: Not on file   Food Insecurity:     Worried About Running Out of Food in the Last Year: Not on file    Veronica of Food in the Last Year: Not on file   Transportation Needs:     Lack of Transportation (Medical): Not on file    Lack of Transportation (Non-Medical):  Not on file   Physical Activity:     Days of Exercise per Week: Not on file    Minutes of Exercise per Session: Not on file   Stress:     Feeling of Stress : Not on file   Social Connections:     Frequency of Communication with Friends and Family: Not on file    Frequency of Social Gatherings with Friends and Family: Not on file    Attends Anabaptist Services: Not on file    Active Member of [address] [place] [address] or Organizations: Not on file    Attends Club or Organization Meetings: Not on file    Marital Status: Not on file   Intimate Partner Violence:     Fear of Current or Ex-Partner: Not on file    Emotionally Abused: Not on file    Physically Abused: Not on file    Sexually Abused: Not on file   Housing Stability:     Unable to Pay for Housing in the Last Year: Not on file    Number of [place] in the Last Year: Not on file    Unstable Housing in the Last Year: Not on file       SCREENINGS:   Riley Coma Scale  Eye Opening: Spontaneous  Best Verbal Response: Oriented  Best Motor Response: Obeys commands  Diego Coma Scale Score: 15        PHYSICAL EXAM:       ED Triage Vitals [22 1647]   BP Temp Temp Source Pulse Resp SpO2 Height Weight   138/81 98.2 °F (36.8 °C) Oral 117 28 96 % 5' 8\" (1.290 m) 180 lb (81.6 kg)       Physical Exam    CONSTITUTIONAL: Awake and alert. Cooperative. Well-developed. Well-nourished. Vitals:    01/22/22 2017 01/22/22 2046 01/22/22 2116 01/22/22 2208   BP: 136/73 128/84 130/77 135/83   Pulse: 104 97 107 126   Resp: 26 (!) 36 (!) 38 24   Temp:    98.3 °F (36.8 °C)   TempSrc:    Oral   SpO2: 99% 99% 100% 93%   Weight:       Height:         HENT: Normocephalic. Atraumatic. External ears normal, without discharge. TMs clear bilaterally. No nasal discharge. Oropharynx clear, no erythema. Mucous membranes moist.  EYES: Conjunctiva non-injected, no lid abnormalities noted. No scleral icterus. PERRL. EOM's grossly intact. Anterior chambers clear. NECK: Supple. Normal ROM. No meningismus. No thyroid tenderness or swelling noted. CARDIOVASCULAR: tachycardic, irregular. No Murmer. PULMONARY/CHEST WALL: Effort normal. No tachypnea. Lungs clear to ausculation. ABDOMEN: Normal BS. Soft. Nondistended. No tenderness to palpate. No guarding. No hernias noted. No splenomegaly. Back: Spine is midline. No ecchymosis. No crepitus on palpation. No obvious subluxation of vertebral column. No saddle anesthesia or evidence of cauda equina. /ANORECTAL: Not assessed  MUSKULOSKELETAL: Normal ROM. No acute deformities. No edema. No tenderness to palpate. SKIN: Warm and dry. NEUROLOGICAL:  GCS 15. CN II-XII grossly intact. Strength is 5/5 in allextremities and sensation is intact. PSYCHIATRIC: Normal affect, normal insight and judgement. Alert andoriented x 3.         DIAGNOSTIC RESULTS:     LABS:    Results for orders placed or performed during the hospital encounter of 01/22/22   CBC auto differential   Result Value Ref Range    WBC 16.5 (H) 4.0 - 11.0 K/uL    RBC 3.38 (L) 4.20 - 5.90 M/uL    Hemoglobin 9.9 (L) 13.5 - 17.5 g/dL    Hematocrit 30.9 (L) 40.5 - 52.5 %    MCV 91.4 80.0 - 100.0 fL    MCH 29.4 26.0 - 34.0 pg    MCHC 32.1 31.0 - 36.0 g/dL    RDW 18.1 (H) 12.4 - 15.4 %    Platelets 473 (H) 135 - 450 K/uL    MPV 6.7 5.0 - 10.5 fL    Neutrophils % 93.0 %    Lymphocytes % 2.7 %    Monocytes % 3.9 %    Eosinophils % 0.0 %    Basophils % 0.4 %    Neutrophils Absolute 15.3 (H) 1.7 - 7.7 K/uL    Lymphocytes Absolute 0.4 (L) 1.0 - 5.1 K/uL    Monocytes Absolute 0.6 0.0 - 1.3 K/uL    Eosinophils Absolute 0.0 0.0 - 0.6 K/uL    Basophils Absolute 0.1 0.0 - 0.2 K/uL   Comprehensive metabolic panel   Result Value Ref Range    Sodium 136 136 - 145 mmol/L    Potassium 5.4 (H) 3.5 - 5.1 mmol/L    Chloride 98 (L) 99 - 110 mmol/L    CO2 26 21 - 32 mmol/L    Anion Gap 12 3 - 16    Glucose 98 70 - 99 mg/dL    BUN 24 (H) 7 - 20 mg/dL    CREATININE 0.9 0.8 - 1.3 mg/dL    GFR Non-African American >60 >60    GFR African American >60 >60    Calcium 9.8 8.3 - 10.6 mg/dL    Total Protein 8.3 (H) 6.4 - 8.2 g/dL    Albumin 3.0 (L) 3.4 - 5.0 g/dL    Albumin/Globulin Ratio 0.6 (L) 1.1 - 2.2    Total Bilirubin 0.5 0.0 - 1.0 mg/dL    Alkaline Phosphatase 148 (H) 40 - 129 U/L    ALT 21 10 - 40 U/L    AST 34 15 - 37 U/L   Troponin   Result Value Ref Range    Troponin <0.01 <0.01 ng/mL   Blood gas, venous   Result Value Ref Range    pH, Benedicto 7.345 (L) 7.350 - 7.450    pCO2, Benedicto 57.0 (H) 40.0 - 50.0 mmHg    pO2, Benedicto 29.3 25.0 - 40.0 mmHg    HCO3, Venous 30.4 (H) 23.0 - 29.0 mmol/L    Base Excess, Benedicto 3.7 (H) -3.0 - 3.0 mmol/L    O2 Sat, Benedicto 50 Not Established %    Carboxyhemoglobin 3.1 (H) 0.0 - 1.5 %    MetHgb, Benedicto 0.3 <1.5 %    TC02 (Calc), Benedicto 32 Not Established mmol/L    O2 Therapy Unknown    Lactic acid, plasma   Result Value Ref Range    Lactic Acid 1.2 0.4 - 2.0 mmol/L   Procalcitonin   Result Value Ref Range    Procalcitonin 0.35 (H) 0.00 - 0.15 ng/mL   EKG 12 Lead   Result Value Ref Range    Ventricular Rate 105 BPM    Atrial Rate 113 BPM    QRS Duration 122 ms    Q-T Interval 378 ms    QTc Calculation (Bazett) 499 ms    R Axis 92 degrees    T Axis 0 degrees    Diagnosis       Atrial fibrillation with rapid ventricular responseRight bundle branch blockAbnormal ECGWhen compared with ECG of 14-SEP-2020 01:32,Atrial fibrillation has replaced Sinus rhythmVent. rate has increased BY  46 BPM         RADIOLOGY:  All x-ray studies are viewed/reviewedby me. Formal interpretations per the radiologist are as follows:      XR CHEST PORTABLE   Final Result   Chronic appearing reticulonodular opacities. EKG:  See EKG interpretation by an attending phsyician      PROCEDURES:   N/A    CRITICAL CARE TIME:   N/A    CONSULTS:  None      EMERGENCYDEPARTMENT COURSE and DIFFERENTIAL DIAGNOSIS/MDM:   Vitals:    Vitals:    01/22/22 2017 01/22/22 2046 01/22/22 2116 01/22/22 2208   BP: 136/73 128/84 130/77 135/83   Pulse: 104 97 107 126   Resp: 26 (!) 36 (!) 38 24   Temp:    98.3 °F (36.8 °C)   TempSrc:    Oral   SpO2: 99% 99% 100% 93%   Weight:       Height:             Patient was evaluated by both myself and Dr. Todd Hassan   Patient presented to the emergency room today with complaints of persistent shortness of breath and tachycardia is been going on since November, patient is on home oxygen he does live alone. Patient EKG showed atrial fibrillation he does have a history of paroxysmal atrial fibrillation. Laboratory studies showed a leukocytosis at 16.5 although he is not febrile and lactate is negative. Patient was ambulated in the room and his heart rate went up in the 130s without leaving the room. Given these findings I do feel the patient would benefit from admission to the hospital for cardiac evaluation as well as pulmonology. Patient agreed with this plan. Patient evaluated by the attending physician who is also in agreement. Patient laboratory studies, radiographic imaging, andassessment were all discussed with the patient and/or patient family. There was shared decision-making between myself, the attending physician, as well as the patient and/or their surrogate and we are all in agreement with admission.   There was an opportunity for questions and all questions were answered to the best of my ability and to the satisfaction of the patient and/or patient family. FINAL IMPRESSION:      1. Tachycardia    2. Shortness of breath    3. Paroxysmal atrial fibrillation (HCC)          DISPOSITION/PLAN:   DISPOSITIONAdmitted      PATIENT REFERRED TO:  No follow-up provider specified.     DISCHARGE MEDICATIONS:  Current Discharge Medication List                     (Please note that portions of this note were completed with a voice recognition program.  Efforts were made to edit the dictations, but occasionally words are mis-transcribed.)    TOAN Luna - CNP-C (electronically signed)        TOAN Luna CNP  01/22/22 5000

## 2022-01-23 NOTE — ED PROVIDER NOTES
I independently examined and evaluated Suzanne Holguin. In brief, patient is a 79-year-old male presents to the emergency department for evaluation of shortness of breath. Patient reports having history of atrial fibrillation and is on amiodarone at home. Reports he had to be taken off of anticoagulation due to a recent GI bleed. Since then, he has been having uncontrolled tachycardia with increasing oxygen requirement over the past several months. Reports he has not been able to do activities of daily living at home due to the shortness of breath. Focused exam revealed generally ill appearing male on home 6L nc O2 maintaning o2 sat >90. Tachycardic to 117. Normotensive. hospitalist consulted for admission for further evaluation and treatment. Admit. The Ekg interpreted by me shows  Atrial fibrillation with a rate of 105  Axis is normal  QTc is prolonged  Right bundle branch block  ST Segments: Nonspecific changes    All diagnostic, treatment, and disposition decisions were made by myself in conjunction with the advanced practice provider. I personally saw the patient and performed a substantive portion of the visit including aspects of the medical decision making. Comment: Please note this report has been produced using speech recognition software and may contain errors related to that system including errors in grammar, punctuation, and spelling, as well as words and phrases that may be inappropriate. If there are any questions or concerns please feel free to contact the dictating provider for clarification. For all further details of the patient's emergency department visit, please see the advanced practice provider's documentation.          Jaguar Key MD  01/23/22 4051

## 2022-01-23 NOTE — PROGRESS NOTES
NURSING ASSESSMENT: [facility]    Patient:De Vallecillo     Dx/Hx: Atrial fibrillation [facility]) [I48.91]  Shortness of breath [R06.02]  Paroxysmal atrial fibrillation ([address] [I48.0]  Tachycardia [R00.0]   VKK:9/22/5333  MDN:[id number]  Date of Admit: 1/22/2022  Room #: 5547/7775-60    Subjective:   Patient admitted to room 359 from the E.D. Alert and oriented x4. Oriented to room and call light system. Oriented to  routine of VS, rounding, lab work, and possible procedures. Informed about ordering of meals with PCA. Drug / Medication Review:   Medications were reviewed by RN at time of admission  [x]  No potential or actual clinically significant medication issues were noted. []  Potential or actual clinically significant medication issues were found and MD was notified. 4 Eyes Skin Assessment   The patient is being assessed for: Admission     I agree that 2 RN's have performed a thorough Head to Toe Skin Assessment on the patient. ALL assessment sites listed below have been assessed. Areas assessed by both nurses:   [x]   Head, Face, and Ears   [x]   Shoulders, Back, and Chest, Abdomen  [x]   Arms, Elbows, and Hands   [x]   Coccyx, Sacrum, and Ischium  [x]   Legs, Feet, and Heel     Does the Patient have Skin Breakdown?    No         Sergio Prevention initiated:  Yes  Wound Care Orders initiated:   Not Applicable      [facility] nurse consulted for Pressure Injury (Stage 3,4, Unstageable, DTI, NWPT, Complex wounds)and New or Established Ostomies:   Not Applicable    Primary Nurse eSignature:  Wilmer Obrien RN   Co-signer eSignature:

## 2022-01-23 NOTE — RT PROTOCOL NOTE
RT Inhaler-Nebulizer Bronchodilator Protocol Note    There is a bronchodilator order in the chart from a provider indicating to follow the RT Bronchodilator Protocol and there is an Initiate RT Inhaler-Nebulizer Bronchodilator Protocol order as well (see protocol at bottom of note). CXR Findings:  XR CHEST PORTABLE    Result Date: 1/22/2022  Chronic appearing reticulonodular opacities. The findings from the last RT Protocol Assessment were as follows:   History Pulmonary Disease: Chronic pulmonary disease  Respiratory Pattern: Mild dyspnea at rest, irregular pattern, or RR 21-25 bpm  Breath Sounds: Slightly diminished and/or crackles  Cough: Strong, productive  Indication for Bronchodilator Therapy: On home bronchodilators  Bronchodilator Assessment Score: 9    Aerosolized bronchodilator medication orders have been revised according to the RT Inhaler-Nebulizer Bronchodilator Protocol below. Respiratory Therapist to perform RT Therapy Protocol Assessment initially then follow the protocol. Repeat RT Therapy Protocol Assessment PRN for score 0-3 or on second treatment, BID, and PRN for scores above 3. No Indications  adjust the frequency to every 6 hours PRN wheezing or bronchospasm, if no treatments needed after 48 hours then discontinue using Per Protocol order mode. If indication present, adjust the RT bronchodilator orders based on the Bronchodilator Assessment Score as indicated below. Use Inhaler orders unless patient has one or more of the following: on home nebulizer, not able to hold breath for 10 seconds, is not alert and oriented, cannot activate and use MDI correctly, or respiratory rate 25 breaths per minute or more, then use the equivalent nebulizer order(s) with same Frequency and PRN reasons based on the score. If a patient is on this medication at home then do not decrease Frequency below that used at home.     0-3  enter or revise RT bronchodilator order(s) to equivalent RT Bronchodilator order with Frequency of every 4 hours PRN for wheezing or increased work of breathing using Per Protocol order mode. 4-6  enter or revise RT Bronchodilator order(s) to two equivalent RT bronchodilator orders with one order with BID Frequency and one order with Frequency of every 4 hours PRN wheezing or increased work of breathing using Per Protocol order mode. 7-10  enter or revise RT Bronchodilator order(s) to two equivalent RT bronchodilator orders with one order with TID Frequency and one order with Frequency of every 4 hours PRN wheezing or increased work of breathing using Per Protocol order mode. 11-13  enter or revise RT Bronchodilator order(s) to one equivalent RT bronchodilator order with QID Frequency and an Albuterol order with Frequency of every 4 hours PRN wheezing or increased work of breathing using Per Protocol order mode. Greater than 13  enter or revise RT Bronchodilator order(s) to one equivalent RT bronchodilator order with every 4 hours Frequency and an Albuterol order with Frequency of every 2 hours PRN wheezing or increased work of breathing using Per Protocol order mode. RT to enter RT Home Evaluation for COPD & MDI Assessment order using Per Protocol order mode.     Electronically signed by Yadi Smith RCP on 1/23/2022 at 1:43 AM

## 2022-01-23 NOTE — CONSULTS
INPATIENT PULMONARY CRITICAL CARE CONSULT NOTE      Chief Complaint/Referring Provider:  Patient is being seen at the request of Dr. Filemon Mandujano for a consultation for Bronchiectasis/hypoxemia     Presenting HPI: Patient admitted to the hospital with tachycardia/shortness of breath    As per admitting provider-76 y.o. male, with PMH of atrial fibrillation, HTN, CAD and HLD, who presented to [facility] with tachycardia and shortness of breath. History obtained from the patient and review of EMR. Patient stated he has been experiencing generalized weakness since November. He stated he has also been experiencing an increase in shortness of breath for the last 1 to 2 months. The patient does have a history of bronchiectasis and follows with pulmonology. He stated he usually wears 3 L nasal cannula of supplemental oxygen at home at baseline, but has had to increase it recently to 6 L nasal cannula. The patient stated he also has a history of tachycardia/atrial fibrillation. He stated he follows with Dr. Marquis Johnson from cardiology and Dr. Sara Pritchett from EP. The patient stated when he has episodes of tachycardia and rest for the last 10 to 13 seconds and resolves on its own. However, the patient stated over the last week or so his tachycardia has not been resolving. He stated today his heart rate went as high as 150 so he went to the emergency room for further evaluation. In the emergency room a twelve-lead EKG was done that did confirm he was in atrial fibrillation with RVR. He was given 1 L of normal saline. The patient will be admitted for further evaluation and treatment. He denied any other associated symptoms as well as any aggravating and/or alleviating factors. At the time of this assessment, the patient was resting comfortably in bed.  He currently denies any chest pain, back pain, abdominal pain, shortness of breath, numbness, tingling, N/V/C/D, fever and/or chills    Patient when seen this morning states that he has been having increasing shortness of breath, patient has cough with chest congestion, patient states that his airway clearance is not effective, patient states that when he is able to bring up the phlegm which is purulent in nature, patient also has nasal congestion sinus congestion, patient does not have any otalgia no ear discharge, patient has shortness of breath or wheezing, patient's activities of daily living are limited, patient does not have any significant fever or chills, patient had palpitations and tachycardia prior to hospitalization, patient does not have any significant nausea or vomiting at this time, patient does not have any increasing leg edema; patient does have history of NICHOLAS but has not been using his BiPAP, patient states that he has an old CPAP which he uses once in a while but not on regular basis, patient was offered a CPAP machine from the hospital but patient does not want to take it at this time, patient follows Dr. Mya Montgomery at [facility] but patient states that it has been difficult and challenging to get appointments nowadays because of the COVID-19 pandemic;no change in ambient environment   No other ROS of concern      Patient Active Problem List    Diagnosis Date Noted    S/P CABG x 4 04/02/2013    Coronary atherosclerosis of native coronary artery 03/30/2013    HTN (hypertension) 08/18/2010    Adrenal mass ([facility] [address] 05/22/2010    HYPERLIPIDEMIA 05/17/2010    Acute blood loss anemia 04/03/2013    Atrial fibrillation ([facility] 05/12/2010    Sleep apnea 07/04/2012    Diplopia 06/10/2012    Steatosis, liver 05/22/2010    Acute bronchitis 05/17/2010    Smoking 05/17/2010    MCALLISTER 05/17/2010    Diverticulitis of colon 05/17/2010    Calculus of kidney 05/17/2010    Benign neoplasm of colon 05/17/2010    SOB (shortness of breath) 01/24/2022    Chest congestion 01/24/2022    Pulmonary infiltrates 01/24/2022    Lung nodules 01/24/2022    Mediastinal adenopathy 2022    Grade II diastolic dysfunction     Pulmonary HTN ([address] 2022    H/O pneumonia due to Pseudomonas 2022    Former smoker 2022    Paecilomyces lilacinus infection 2022    NICHOLAS (obstructive sleep apnea) 2022    Leukocytosis 2022    Tachycardia 2022       Past Medical History:   Diagnosis Date    Acute bronchitis     Adrenal adenoma     noted on CT, 2.2 x 1cm    Atrial fibrillation ([address]     Atrial fibrillation ([address] 2010    Benign neoplasm of colon 2010    Calculus of kidney     Colonic polyps     benign    Diverticulitis     Diverticulitis of colon (without mention of hemorrhage)(562.11) 2010    Fatty liver     Hyperlipidemia     Kidney stone     Other and unspecified hyperlipidemia 2010    Other chronic nonalcoholic liver disease 3/24/5980    Palpitations     echo showed diastolic dysfunction, holter monitor ( + )  for brief runs of SVT    Renal cyst     bilateral (7cm on L and 3 cm on R), no adrenal mass    Steatosis, liver 2010    Tobacco use disorder 2010        Past Surgical History:   Procedure Laterality Date    CARDIOVASCULAR STRESS TEST  10/07, 10/05    stress echo-normal, negative    COLONOSCOPY      polyps and diverticuli, tubular adenoma    DOPPLER ECHOCARDIOGRAPHY      moderate in crease LA, otherwise ( - )    KIDNEY STONE SURGERY      LIVER BIOPSY  3/07    fatty liver - no autoimmune hepatits        Family History   Problem Relation Age of Onset    Macular Degen Sister     Thyroid Disease Mother     Cancer Neg Hx     Diabetes Neg Hx     Heart Disease Neg Hx     Kidney Disease Neg Hx         Social History     Tobacco Use    Smoking status: Former Smoker     Packs/day: 1.00     Years: 52.00     Pack years: 52.00     Types: Cigarettes     Quit date: 3/29/2013     Years since quittin.8    Smokeless tobacco: Never Used    Tobacco comment: using chantix   Substance Use Topics    Alcohol use: No        No Known Allergies            Physical Exam:  Blood pressure 115/74, pulse 105, temperature 98 °F (36.7 °C), temperature source Oral, resp. rate 20, height 5' 8\" (1.727 m), weight 180 lb (81.6 kg), SpO2 91 %.'     Constitutional:  Mild respiratory distress with audible chest congestion   HENT:  Oropharynx is clear and moist. No thyromegaly. Eyes:  Conjunctivae are normal. Pupils equal, round, and reactive to light. No scleral icterus. pallor   Neck: . No tracheal deviation present. No obvious thyroid mass. Cardiovascular: s1s2 irregularly irregular , normal heart sounds. No right ventricular heave. No lower extremity edema. Pulmonary/Chest: No wheezes. B/L  Rales. Increased chest congestion   Chest wall is not dull to percussion. No accessory muscle usage or stridor. Decreased BSI with prolonged expiration   Abdominal: Soft. Bowel sounds present. No distension or hernia. No tenderness. Musculoskeletal: No cyanosis. No clubbing. No obvious joint deformity. Lymphadenopathy: No cervical or supraclavicular adenopathy. Skin: Skin is warm and dry. No rash or nodules on the exposed extremities. Psychiatric: Normal mood and affect. Behavior is normal.  No anxiety. Neurologic: Alert, awake and oriented. PERRL. Speech fluent        Results:  CBC:   Recent Labs     01/22/22  1702   WBC 16.5*   HGB 9.9*   HCT 30.9*   MCV 91.4   *     BMP:   Recent Labs     01/22/22  1702 01/23/22  0940    137   K 5.4* 4.5   CL 98* 101   CO2 26 24   BUN 24* 22*   CREATININE 0.9 0.8     LIVER PROFILE:   Recent Labs     01/22/22  1702   AST 34   ALT 21   BILITOT 0.5   ALKPHOS 148*     PT/INR:   Recent Labs     01/23/22  0940   PROTIME 16.5*   INR 1.44*       Imaging:  I have reviewed radiology images personally. XR CHEST PORTABLE   Final Result   Chronic appearing reticulonodular opacities.            XR CHEST PORTABLE    Result Date: 1/22/2022  EXAMINATION: ONE XRAY VIEW OF THE CHEST 1/22/2022 5:16 pm COMPARISON: 09/14/2020 HISTORY: ORDERING SYSTEM PROVIDED HISTORY: cough TECHNOLOGIST PROVIDED HISTORY: Reason for exam:->cough Reason for Exam: Shortness of Breath (pt to ED with c/o SOB and weakness since November. pt denies any chest pain reports some cough. on 6 L O2 at home at baseline) FINDINGS: The cardiomediastinal silhouette is unchanged in appearance. Status post median sternotomy. Bilateral reticulonodular opacities are without significant change compared to the exam in 2020. Blunting of the costophrenic angles also appears unchanged. No pneumothorax. The osseous structures are unchanged in appearance. Chronic appearing reticulonodular opacities. EKG-Atrial fibrillation with rapid ventricular responseRight bundle branch blockAbnormal ECGWhen compared with ECG of 14-SEP-2020 01:32,Atrial fibrillation has replaced Sinus rhythmVent. rate has increased BY  46 BPM      From care everywhere-CT chest in Oct 2021-IMPRESSION:     1.  Progression of large and small airways disease and diffuse pulmonary nodules as the predominant feature, with evidence of fibrosis. The pattern is not suggestive of UIP. 2.  On the background of widespread diffuse nodules, there are clusters of irregular and tree-in-bud nodules, nonspecific but likely suggestive of active bronchiolitis. 3.  Additional random nodules including the cavitary examples, which could also be infectious/inflammatory. Neoplastic nodules cannot be excluded and CT follow-up is recommended in 2-3 months. 4.  Increased mild diffuse lymphadenopathy within the mediastinum and ray is likely reactive. 5.  Nodular liver contour suggesting cirrhosis. Echocardiogram:Oct 2021-Study Conclusions     - Left ventricle: The cavity size is normal. Wall thickness is     normal. Systolic function was normal. The calculated ejection     fraction was in the range of 58% to 63%.  Wall motion was normal;   there were no regional wall motion abnormalities. Features are     consistent with a pseudonormal left ventricular filling pattern,     with concomitant abnormal relaxation and increased filling     pressure (grade 2 diastolic dysfunction). The stroke volume is     83ml. The stroke index is 42ml/m^2. The global longitudinal   Fareed Hickman was -19%. - Aortic valve: The peak systolic gradient is 27RM Hg. - Left atrium: The atrium is mildly dilated. - Inferior vena cava: The vessel was normal in size; the     respirophasic diameter changes were in the normal range (&gt;= 50%);     findings are consistent with normal central venous pressure. - Pulmonary arteries: The peak pressure during systole by Doppler     is 47mm Hg. PFT:Oct 2021-Spirometry shows severe obstructive defect. The flow volume loop is compatible with obstructive lung defect. Lung volumes demonstrate elevated RV. There is air trapping present. DLCO is severely reduced but corrects partially when adjusted to [place]. A reduced DLCO can be seen in diseases causing destruction of   alveolar-capillary interface (ILD, CHF, COPD, Pneumonia, etc.) obliteration   of pulmonary vascular bed (PE, PAH), tobacco usage, carbon monoxide   poisoning. Airway resistance is severely elevated. PFT is compatible with severe obstructive lung disease-possible etiologies   include: asthma, COPD, bronchiectasis, bronchitis. Clinical correlation is   advised     Oct 2021-Specimen:  Sputum - Sputum  Component 3 mo ago   Gram Stain   High   Many Polymorphonuclear Leukocytes Seen . Rare Epithelial Cells, Squamous . Rare  Gram Positive Cocci In Clusters   . Many Gram Negative Bacilli . The gram stain indicates that specimen is representative of the lower respiratory   tract.    Culture Result:  Heavy Growth : High     Ampicillin/Sulbactam  <=8/4 Sensitive (S)    Amoxicillin/Clavulanic Acid  <=8/4 Sensitive (S)    Piperacillin/Tazobactam  <=16 Sensitive (S) implications were discussed  · Patient continues to have increased chest congestion with ineffective airway clearance and given the patient's clinical status and the previous evaluations patient was told about the options including a bronchoscopy for diagnostic and therapeutic purposes and patient was agreeable-we will arrange that for tomorrow morning  · On the base of the patient's previous cultures patient's IV antibiotic was changed from Levaquin to meropenem  · If patient has Paecilomyces infection -amphotericin B/posaconazole are options -we will hold off on that till final culture reports  · Patient is on Symbicort which can be continued  · Patient does not need any albuterol on a regular basis with Symbicort as it can cause patient to have more beta agonist effects including tachycardia-change to as needed basis  · Cardiac medications as per IM/cardiology  · And duration as per IM/cardiology  · PUD prophylaxis as per IM    Case discussed with patient and nursing    Further management depending on patient clinical status and the follow-up on above recommendations along with the bronchoscopy findings        Electronically signed by:  Alfonso Siddiqui MD    1/24/2022    12:57 AM.

## 2022-01-23 NOTE — CONSULTS
Consult placed    Who: Dr. Luna Ames   Date:1/23/2022,  Time:7:49 AM        Electronically signed by Alessandro Johnson on 1/23/2022 at 7:49 AM

## 2022-01-23 NOTE — ED NOTES
Telephone report w/ Donal Matthews RN. RN to transport pt by bed.       Nina Osuna RN  01/22/22 1933

## 2022-01-23 NOTE — PROGRESS NOTES
Hospitalist Progress Note      PCP: Jeferson Tinajero MD    Date of Admission: 1/22/2022    Chief Complaint: tachycardia, SOB    Hospital Course:   67 y.o. male, with PMH of atrial fibrillation, HTN, CAD and HLD, who presented to [facility] with tachycardia and shortness of breath. History obtained from the patient and review of EMR. Patient stated he has been experiencing generalized weakness since November. He stated he has also been experiencing an increase in shortness of breath for the last 1 to 2 months. The patient does have a history of bronchiectasis and follows with pulmonology. He stated he usually wears 3 L nasal cannula of supplemental oxygen at home at baseline, but has had to increase it recently to 6 L nasal cannula. The patient stated he also has a history of tachycardia/atrial fibrillation. He stated he follows with Dr. Jake Wilson from cardiology and Dr. Fred Dai from EP. The patient stated when he has episodes of tachycardia and rest for the last 10 to 13 seconds and resolves on its own. However, the patient stated over the last week or so his tachycardia has not been resolving. He stated today his heart rate went as high as 150 so he went to the emergency room for further evaluation. In the emergency room a twelve-lead EKG was done that did confirm he was in atrial fibrillation with RVR. He was given 1 L of normal saline. The patient will be admitted for further evaluation and treatment. He denied any other associated symptoms as well as any aggravating and/or alleviating factors. At the time of this assessment, the patient was resting comfortably in bed. He currently denies any chest pain, back pain, abdominal pain, shortness of breath, numbness, tingling, N/V/C/D, fever and/or chills. Subjective: HR improved but still tachycardic. Continues with increased SOB. Awaiting pulm recs.        Medications:  Reviewed    Infusion Medications    sodium chloride       Scheduled Medications Pulses: +2 palpable, equal bilaterally       Labs:   Recent Labs     01/22/22  1702   WBC 16.5*   HGB 9.9*   HCT 30.9*   *     Recent Labs     01/22/22  1702 01/23/22  0940    137   K 5.4* 4.5   CL 98* 101   CO2 26 24   BUN 24* 22*   CREATININE 0.9 0.8   CALCIUM 9.8 9.3     Recent Labs     01/22/22  1702   AST 34   ALT 21   BILITOT 0.5   ALKPHOS 148*     Recent Labs     01/23/22  0940   INR 1.44*     Recent Labs     01/22/22  1702   TROPONINI <0.01       Urinalysis:      Lab Results   Component Value Date    NITRU Negative 06/23/2014    WBCUA 0-2 12/08/2013    BACTERIA 1+ 08/07/2010    RBCUA 0-2 12/08/2013    BLOODU Negative 06/23/2014    SPECGRAV 1.010 06/23/2014    GLUCOSEU Negative 06/23/2014    GLUCOSEU NEGATIVE 08/07/2010       Radiology:  XR CHEST PORTABLE   Final Result   Chronic appearing reticulonodular opacities. 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]      Priority: High    HTN (hypertension) [I10]      Priority: High    Atrial fibrillation (HCC) [I48.91]      Priority: Medium    Tachycardia [R00.0]      Afib with RVR  - cardiology consulted  - stopped amiodarone  - continue metoprolol. Started on cardizem  - no AC due to history of GI bleed    Acute on chronic hypoxic respiratory failure 2/2 bronchiectasis  - pulm consulted  - PCT elevated. Started on levaquin  - continue home inhalers  - wean O2 as able. On 3L NC at baseline    HTN  - well controlled  - continue metoprolol, cardizem    HLD  - continue home statin    Anemia  - chronic, stable  - continue to monitor    DVT Prophylaxis: lovenox  Diet: ADULT DIET;  Regular  Code Status: Full Code    PT/OT Eval Status: not ordered    Dispo - home in 1-2 days    Anna Laureano MD

## 2022-01-23 NOTE — H&P
Hospital Medicine History & Physical      PCP: Francisco Crandall MD    Date of Admission: 1/22/2022    Date of Service: Pt seen/examined on 1/22/2022 and Admitted to Inpatient with expected LOS greater than two midnights due to medical therapy. Chief Complaint:  Tachycardia and shortness of breath    History Of Present Illness:     67 y.o. male, with PMH of atrial fibrillation, HTN, CAD and HLD, who presented to [facility] with tachycardia and shortness of breath. History obtained from the patient and review of EMR. Patient stated he has been experiencing generalized weakness since November. He stated he has also been experiencing an increase in shortness of breath for the last 1 to 2 months. The patient does have a history of bronchiectasis and follows with pulmonology. He stated he usually wears 3 L nasal cannula of supplemental oxygen at home at baseline, but has had to increase it recently to 6 L nasal cannula. The patient stated he also has a history of tachycardia/atrial fibrillation. He stated he follows with Dr. Marquis Johnson from cardiology and Dr. Sara Pritchett from EP. The patient stated when he has episodes of tachycardia and rest for the last 10 to 13 seconds and resolves on its own. However, the patient stated over the last week or so his tachycardia has not been resolving. He stated today his heart rate went as high as 150 so he went to the emergency room for further evaluation. In the emergency room a twelve-lead EKG was done that did confirm he was in atrial fibrillation with RVR. He was given 1 L of normal saline. The patient will be admitted for further evaluation and treatment. He denied any other associated symptoms as well as any aggravating and/or alleviating factors. At the time of this assessment, the patient was resting comfortably in bed. He currently denies any chest pain, back pain, abdominal pain, shortness of breath, numbness, tingling, N/V/C/D, fever and/or chills.      Past Medical History:          Diagnosis Date    Acute bronchitis     Adrenal adenoma 6/05    noted on CT, 2.2 x 1cm    Atrial fibrillation (HCC)     Atrial fibrillation ([facility] [address] 5/12/2010    Benign neoplasm of colon 5/17/2010    Calculus of kidney     Colonic polyps     benign    Diverticulitis     Diverticulitis of colon (without mention of hemorrhage)(562.11) 5/17/2010    Fatty liver     Hyperlipidemia     Kidney stone 6/05    Other and unspecified hyperlipidemia 5/17/2010    Other chronic nonalcoholic liver disease 7/63/8156    Palpitations 8/04    echo showed diastolic dysfunction, holter monitor ( + )  for brief runs of SVT    Renal cyst 11/07    bilateral (7cm on L and 3 cm on R), no adrenal mass    Steatosis, liver 5/22/2010    Tobacco use disorder 5/17/2010     Past Surgical History:          Procedure Laterality Date    CARDIOVASCULAR STRESS TEST  10/07, 10/05    stress echo-normal, negative    COLONOSCOPY  7/08    polyps and diverticuli, tubular adenoma    DOPPLER ECHOCARDIOGRAPHY  6/09    moderate in crease LA, otherwise ( - )   [address] [place] [place] [facility] [address] SURGERY  6/05    LIVER BIOPSY  3/07    fatty liver - no autoimmune hepatits     Medications Prior to Admission:      Prior to Admission medications    Medication Sig Start Date End Date Taking? Authorizing Provider   ferrous sulfate (IRON 325) 325 (65 Fe) MG tablet Take 325 mg by mouth 2 times daily    Historical Provider, MD   FLUoxetine (PROZAC) 10 MG capsule Take 10 mg by mouth daily    Historical Provider, MD   rivaroxaban (XARELTO) 20 MG TABS tablet TAKE 1 TABLET BY MOUTH DAILY 5/10/16   Historical Provider, MD   ATROVENT HFA 17 MCG/ACT inhaler INHALE 1 PUFF INTO LUNGS THREE TIMES DAILY 3/13/15   Tavia Denny MD   Oral Medication Containers (MEDICATION CONTAINER/SMALL) MISC Take 5 mLs by mouth 4 times daily as needed.  Miles Mixture:  80 cc Viscous Lidocaine, 20 mg Hydrocortisone, 100 mg Doxycycline, 2 million units Nystatin, qs up to 180 cc with Benadryl elixir (cherry or grape) 2/19/15   Fly Fisher MD   amiodarone (CORDARONE) 200 MG tablet TAKE 1 TABLET BY MOUTH EVERY DAY 2/13/15   Fly Fisher MD   fluocinonide (LIDEX) 0.05 % ointment APPLY TO THE AFFECTED AREA TWICE DAILY 11/10/14   Fly Fisher MD   fluticasone [facility]) 50 MCG/ACT nasal spray USE 2 SPRAYS IN [facility] NOSTRIL DAILY 10/27/14   Fly Fisher MD   Pseudoephedrine-Ibuprofen  MG TABS Take 1 capsule by mouth 4 times daily as needed. 6/26/14   Fly Fisher MD   atorvastatin (LIPITOR) 40 MG tablet TAKE 1 TABLET BY MOUTH ONCE DAILY 5/31/14   Fly Fisher MD   Omega-3 Fatty Acids (OMEGA 3 PO) Take  by mouth. Historical Provider, MD   doxycycline (VIBRAMYCIN) 50 MG capsule Take 50 mg by mouth daily. Historical Provider, MD   simvastatin (ZOCOR) 20 MG tablet TAKE 1 TABLET BY MOUTH EVERY NIGHT 4/18/14   Fly Fisher MD   furosemide (LASIX) 40 MG tablet Take 1 tablet by mouth daily for 10 days. 4/12/13 9/14/20  Roshan Stoner MD   docusate sodium 100 MG CAPS Take 100 mg by mouth 2 times daily as needed for Constipation. 4/12/13   Roshan Stoner MD   potassium chloride (POTASSIUM CHLORIDE) 10 MEQ tablet Take 1 tablet by mouth 3 times daily (with meals) for 10 days. 4/12/13 9/14/20  Roshan Stoner MD   aspirin 81 MG chewable tablet Take 1 tablet by mouth daily. 4/12/13   Roshan Stoner MD   metoprolol (LOPRESSOR) 50 MG tablet Take 1 tablet by mouth 2 times daily. Hold this medication if Systolic blood pressure (top number) is less than 90 or if heart rate is less than 60 and call Dr. Collette Moctezuma office for further instructions. Ask for YOLIE HERNANDEZ [facility]. Call [phone]. 4/12/13   Roshan Stoner MD   fluticasone-salmeterol (ADVAIR DISKUS) 250-50 MCG/DOSE AEPB Inhale 1 puff into the lungs every 12 hours. Historical Provider, MD     Allergies:  Patient has no known allergies.     Social History:      The patient currently lives at home    TOBACCO:   reports that he quit smoking about 8 years ago. His smoking use included cigarettes. He has a 52.00 pack-year smoking history. He has never used smokeless tobacco.  ETOH:   reports no history of alcohol use. E-Cigarettes/Vaping Use     Questions Responses    E-Cigarette/Vaping Use     Start Date     Passive Exposure     Quit Date     Counseling Given     Comments         Family History:      Reviewed in detail. Positive as follows:        Problem Relation Age of Onset    Macular Degen Sister     Thyroid Disease Mother     Cancer Neg Hx     Diabetes Neg Hx     Heart Disease Neg Hx     Kidney Disease Neg Hx      REVIEW OF SYSTEMS COMPLETED:   Pertinent positives as noted in the HPI. All other systems reviewed and negative. PHYSICAL EXAM PERFORMED:    /78   Pulse 104   Temp 98.2 °F (36.8 °C) (Oral)   Resp 28   Ht 5' 8\" (1.727 m)   Wt 180 lb (81.6 kg)   SpO2 95%   BMI 27.37 kg/m²     General appearance:  Pleasant male in no apparent distress, appears stated age and cooperative. HEENT:  Pupils equal, round, and reactive to light. Extra ocular muscles intact. Conjunctivae/corneas clear. Neck: Supple, with full range of motion. No jugular venous distention. Trachea midline. Respiratory:  Normal respiratory effort. Clear to auscultation, bilaterally without Rales/Wheezes/Rhonchi. 6 LNC  Cardiovascular:  Irregular rate and rhythm, atrial fibrillation with normal S1/S2 without murmurs, rubs or gallops. Abdomen: Soft, non-tender, non-distended with normal bowel sounds. Musculoskeletal:  No clubbing, cyanosis or edema bilaterally. Full range of motion without deformity. Skin: Skin color, texture, turgor normal.  No significant rashes or lesions.   Neurologic:  Neurovascularly intact Cranial nerves: II-XII intact, grossly non-focal.  Psychiatric:  Alert and oriented, thought content appropriate, normal insight  Capillary Refill: Brisk,3 seconds, normal  Peripheral Pulses: +2 palpable, equal bilaterally     Labs:     Recent Labs 01/22/22  1702   WBC 16.5*   HGB 9.9*   HCT 30.9*   *     Recent Labs     01/22/22  1702      K 5.4*   CL 98*   CO2 26   BUN 24*   CREATININE 0.9   CALCIUM 9.8     Recent Labs     01/22/22  1702   AST 34   ALT 21   BILITOT 0.5   ALKPHOS 148*     Recent Labs     01/22/22  1702   TROPONINI <0.01     Urinalysis:      Lab Results   Component Value Date    NITRU Negative 06/23/2014    WBCUA 0-2 12/08/2013    BACTERIA 1+ 08/07/2010    RBCUA 0-2 12/08/2013    BLOODU Negative 06/23/2014    SPECGRAV 1.010 06/23/2014    GLUCOSEU Negative 06/23/2014    GLUCOSEU NEGATIVE 08/07/2010     Radiology:     CXR: I have reviewed the CXR with the following interpretation: chronic appearing reticulonodular opacities    EKG:  I have reviewed the EKG with the following interpretation: The Ekg interpreted in the absence of a cardiologist shows atrial fibrillation with rapid ventricular response. Right bundle branch block. Abnormal ECG. When compared with ECG of 14-SEP-2020 01:32, Atrial fibrillation has replaced Sinus rhythmVent. rate has increased BY 46 BPM    XR CHEST PORTABLE   Final Result   Chronic appearing reticulonodular opacities.            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] 04/02/2013     Priority: High    HTN (hypertension) [I10] 08/18/2010     Priority: High    Atrial fibrillation ([facility] [address] [I48.91] 05/12/2010     Priority: Medium    Tachycardia [R00.0] 01/22/2022     PLAN:    Tachycardia in setting of atrial fibrillation with RVR in patient with known a fib  -1 L NS given in ED  -continue amiodarone  -continue bb  -tele monitoring  -TSH  -ECHO in am  -[id number] score 3  -anticoagulated on xarelto  -cardiology consult - appreciate recommendations in advance    Shortness of breath in patient with known bronchiectasis  -3 L NC supplemental oxygen at home  -has increased to 6 LNC recently  -CXR revealed: chronic appearing reticulonodular opacities  -continue home meds    Essential HTN in setting of known CAD, s/p CABG  -continue metoprolol  -continue ASA daily    HLD  -continue simvastatin and atorvastatin    Chronic normocytic anemia  -no s/s of bleeding at this tie  -cbc in am  -continue ferrous sulfate    Leukocytosis  -likely 2/2 stress response  -no obvious s/s of infection  -cbc in am    DVT Prophylaxis: xarelto    Diet: No diet orders on file     Code Status: Prior    PT/OT Eval Status: no indication for need at this time    Dispo - 2-3 days pending clinical improvement     Amrita Frazier, TOAN - CNP    Thank you Aletha Herrera MD for the opportunity to be involved in this patient's care.  If you have any questions or concerns please feel free to contact me at [phone].  ---------------------------Anticipated Dr. Paige sheikh------------------------------

## 2022-01-23 NOTE — CONSULTS
[address]   Cardiac Evaluation      Patient: Ladd Ormond  YOB: 1945         Chief Complaint   Patient presents with    Shortness of Breath     pt to ED with c/o SOB and weakness since November. pt denies any chest pain reports some cough. on 6 L O2 at home at baseline        Referring provider: Luz Sauer MD    History of Present Illness:  66 yo WM referred for AF. He has a long hx of PAF and has been on amiodarone. Over the past few months he has had increasing pulmonary disease and a recurrence of his AF. At one time his physician discussed taking him off amio due to the lung disease but he may have been in [employer]. More recently he has felt he was in AF again. He was taken off xarelto after developing a GIB and have some cautery of the \"small bowel\" for what he describes might be AVMs. His last echo was 10/2021 with a normal EF and some diastolic dysfunction, LAE and PAP of 47. Ashu Hoyles He has had increasing lung fibrosis, bronchiectasis  and pneumonia with atypical organisms(mold, pseudomonas, and Mycobacterium Kansasii with abcesses . He had a very abnormal chest CT in 10/2021. He has an elevated procalcitonin and leukocytosis with a left shift without steroids. He uses O2 up to 6L at home. He has become more dyspneic lately. He also has a hx of CAD with stents, CABG and a previous MAZE.  He states he may have had a MANFRED resection but I do not see records of his heart surgery, EPS or stress tests in care everywhere    Past Medical History:   has a past medical history of Acute bronchitis, Adrenal adenoma, Atrial fibrillation ([address] Atrial fibrillation ([address] Benign neoplasm of colon, Calculus of kidney, Colonic polyps, Diverticulitis, Diverticulitis of colon (without mention of hemorrhage)(562.11), Fatty liver, Hyperlipidemia, Kidney stone, Other and unspecified hyperlipidemia, Other chronic nonalcoholic liver disease, Palpitations, Renal cyst, Steatosis, liver, and Tobacco use disorder. Surgical History:   has a past surgical history that includes Colonoscopy (7/08); doppler echocardiography (6/09); cardiovascular stress test (10/07, 10/05); liver biopsy (3/07); and Kidney stone surgery (6/05).      Current Facility-Administered Medications   Medication Dose Route Frequency Provider Last Rate Last Admin    albuterol (PROVENTIL) nebulizer solution 2.5 mg  2.5 mg Nebulization Q6H WA TOAN Oden CNP   2.5 mg at 01/23/22 0804    enoxaparin (LOVENOX) injection 40 mg  40 mg SubCUTAneous Daily Tuyet Jesus MD        dilTIAZem (CARDIZEM CD) extended release capsule 120 mg  120 mg Oral Daily Tuyet Jesus MD        aspirin chewable tablet 81 mg  81 mg Oral Daily TOAN Oden CNP   81 mg at 01/23/22 0831    atorvastatin (LIPITOR) tablet 40 mg  40 mg Oral Daily TOAN Oden CNP   40 mg at 01/23/22 3358    ferrous sulfate (IRON 325) tablet 325 mg  325 mg Oral BID TOAN Oden CNP   325 mg at 01/22/22 2238    FLUoxetine (PROZAC) capsule 10 mg  10 mg Oral Daily TOAN Oden CNP   10 mg at 01/23/22 0831    metoprolol tartrate (LOPRESSOR) tablet 50 mg  50 mg Oral BID TOAN Oden - CNP   50 mg at 01/23/22 0831    simvastatin (ZOCOR) tablet 80 mg  80 mg Oral Nightly TOAN Oden CNP        budesonide-formoterol (SYMBICORT) 160-4.5 MCG/ACT inhaler 1 puff  1 puff Inhalation BID TOAN Oden CNP   1 puff at 01/23/22 0001    sodium chloride flush 0.9 % injection 5-40 mL  5-40 mL IntraVENous 2 times per day TOAN Oden CNP   10 mL at 01/22/22 2238    sodium chloride flush 0.9 % injection 5-40 mL  5-40 mL IntraVENous PRN TOAN Oden CNP        0.9 % sodium chloride infusion  25 mL IntraVENous PRN TOAN Oden CNP        ondansetron (ZOFRAN-ODT) disintegrating tablet 4 mg  4 mg Oral Q8H PRN TOAN Oden CNP        Or    ondansetron (ZOFRAN) injection 4 mg  4 mg IntraVENous Q6H PRN B[employer]o [employer]aning Pratima, APRN - CNP        polyethylene glycol (GLYCOLAX) packet 17 g  17 g Oral Daily PRN Tori Spring, APRN - CNP        acetaminophen (TYLENOL) tablet 650 mg  650 mg Oral Q6H PRN Tori Spring, APRN - CNP   650 mg at 22 0501    Or    acetaminophen (TYLENOL) suppository 650 mg  650 mg Rectal Q6H PRN Tori Spring, APRN - CNP        perflutren lipid microspheres (DEFINITY) injection 1.65 mg  1.5 mL IntraVENous ONCE PRN Tori Spring, APRN - CNP           Allergies:  Patient has no known allergies. Social History:  Social History     Socioeconomic History    Marital status: Single     Spouse name: Not on file    Number of children: Not on file    Years of education: Not on file    Highest education level: Not on file   Occupational History    Not on file   Tobacco Use    Smoking status: Former Smoker     Packs/day: 1.00     Years: 52.00     Pack years: 52.00     Types: Cigarettes     Quit date: 3/29/2013     Years since quittin.8    Smokeless tobacco: Never Used    Tobacco comment: using chantix   Substance and Sexual Activity    Alcohol use: No    Drug use: Not on file    Sexual activity: Not on file   Other Topics Concern    Not on file   Social History Narrative    Not on file     Social Determinants of Health     Financial Resource Strain:     Difficulty of Paying Living Expenses: Not on file   Food Insecurity:     Worried About Running Out of Food in the Last Year: Not on file    Veronica of Food in the Last Year: Not on file   Transportation Needs:     Lack of Transportation (Medical): Not on file    Lack of Transportation (Non-Medical):  Not on file   Physical Activity:     Days of Exercise per Week: Not on file    Minutes of Exercise per Session: Not on file   Stress:     Feeling of Stress : Not on file   Social Connections:     Frequency of Communication with Friends and Family: Not on file    Frequency of Social Gatherings with Friends and Family: Not on file    Attends Restorationism Services: Not on file    Active Member of Clubs or Organizations: Not on file    Attends Club or Organization Meetings: Not on file    Marital Status: Not on file   Intimate Partner Violence:     Fear of Current or Ex-Partner: Not on file    Emotionally Abused: Not on file    Physically Abused: Not on file    Sexually Abused: Not on file   Housing Stability:     Unable to Pay for Housing in the Last Year: Not on file    Number of [place] in the Last Year: Not on file    Unstable Housing in the Last Year: Not on file       Family History:   Family History   Problem Relation Age of Onset    Macular Degen Sister     Thyroid Disease Mother     Cancer Neg Hx     Diabetes Neg Hx     Heart Disease Neg Hx     Kidney Disease Neg Hx      Family history has been reviewed and not pertinent except as noted above. Review of Systems:   · Constitutional: there has been no unanticipated weight loss. No change in energy or activity level   · Eyes: No visual changes   · ENT: No Headaches, hearing loss or vertigo. No mouth sores or sore throat. · Cardiovascular: Reviewed in HPI  · Respiratory: No cough or wheezing, no sputum production. · Gastrointestinal: No abdominal pain, appetite loss, blood in stools. No change in bowel or bladder habits. · Genitourinary: No nocturia, dysuria, trouble voiding  · Musculoskeletal:  No gait disturbance, weakness or joint complaints. · Integumentary: No rash or pruritis. · Neurological: No headache, change in muscle strength, numbness or tingling. No change in gait, balance, coordination, mood, affect, memory, mentation, behavior. · Psychiatric: No anxiety or depression  · Endocrine: No malaise or fever  · Hematologic/Lymphatic: No abnormal bruising or bleeding, blood clots or swollen lymph nodes. · Allergic/Immunologic: No nasal congestion or hives.     Physical Examination:    Vitals:    01/23/22 0006 01/23/22 0455 01/23/22 0804 01/23/22 0815   BP:  123/73 121/72   Pulse:  106  99   Resp:  22 18 18   Temp:  98.6 °F (37 °C)  98 °F (36.7 °C)   TempSrc:  Oral  Oral   SpO2: 95% 94% 94% 94%   Weight:       Height:         Body mass index is 27.37 kg/m². Wt Readings from Last 3 Encounters:   01/22/22 180 lb (81.6 kg)   03/05/20 240 lb (108.9 kg)   02/19/15 271 lb (122.9 kg)      BP Readings from Last 3 Encounters:   01/23/22 121/72   09/14/20 (!) 122/58   03/05/20 (!) 140/86        Physical Examination:    · CONSTITUTIONAL: Well developed, well nourished, chronically ill appearing wm. · EYES: PERRLA. No xanthelasma, sclera non icteric  · EARS,NOSE,MOUTH,THROAT:  Mucous membranes moist, normal hearing  · NECK: Supple, JVP normal, thyroid not enlarged. Carotids 2+ without bruits  · RESPIRATORY: dyspneic at rest with diminisehd BS and dry crackles, more in the bases  · CARDIOVASCULAR: Normal PMI, irregular rate and rhythm, no murmurs, rub or gallop. No edema. Radial pulses present and equal  · CHEST: No scar or masses  · ABDOMEN: Normal bowel sounds. No masses or tenderness. No bruit  · MUSCULOSKELETAL: No clubbing or cyanosis. Moves all extremities well. Normal gait  · SKIN:  Warm and dry. No rashes  · NEUROLOGIC: Cranial nerves intact. Alert and oriented  · PSYCHIATRIC: Calm affect. Appears to have normal judgement and insight        Assessment/Plan  1. AF - has been paroxysmal in the past and now present for probably a month in the setting of increasing pulmonary disease. (his HR in the PMD office 1/14/22 was 110) I will stop the amio since it is not keeping him in SR. He is asking if he can be cardioverted today but he has not been on [facility] and refuses to take this due to previous GIB which may have been AVMs. I will increase the metoprolol and add a small dose of dilt. For now will order only lovenox for DVT prophylaxis. I will ask EP to see him tomorrow for further considerations of his AF.   MATTHEW/CV can be considered but he would need to be on [facility] afterwards and he is high risk for recurrent AF with his severe lung disease. 2. Severe pulmonary disease with a hx of atypical organisms and now with an elevated procalcitonin and WBCs. I think he should be evaluated by pulmonary. His respiratory culture of 10/2021 was very abnormal and I do not see any f/u cultures. 3. CAD - no angina at this time                  Thank you for allowing to me to participate in the care of July Dale.

## 2022-01-23 NOTE — CONSULTS
Consult placed    Who:Dr. Kuldeep Taylor   Date:1/23/2022,  Time:2:01 PM        Electronically signed by Raúl Tang on 1/23/2022 at 2:01 PM

## 2022-01-24 PROBLEM — G47.33 OSA (OBSTRUCTIVE SLEEP APNEA): Status: ACTIVE | Noted: 2022-01-01

## 2022-01-24 PROBLEM — Z87.01 H/O PNEUMONIA DUE TO PSEUDOMONAS: Status: ACTIVE | Noted: 2022-01-01

## 2022-01-24 PROBLEM — R59.0 MEDIASTINAL ADENOPATHY: Status: ACTIVE | Noted: 2022-01-01

## 2022-01-24 PROBLEM — B47.0: Status: ACTIVE | Noted: 2022-01-01

## 2022-01-24 PROBLEM — R91.8 LUNG NODULES: Status: ACTIVE | Noted: 2022-01-01

## 2022-01-24 PROBLEM — R91.8 PULMONARY INFILTRATES: Status: ACTIVE | Noted: 2022-01-01

## 2022-01-24 PROBLEM — D72.829 LEUKOCYTOSIS: Status: ACTIVE | Noted: 2022-01-01

## 2022-01-24 PROBLEM — I51.89 GRADE II DIASTOLIC DYSFUNCTION: Status: ACTIVE | Noted: 2022-01-01

## 2022-01-24 PROBLEM — R09.89 CHEST CONGESTION: Status: ACTIVE | Noted: 2022-01-01

## 2022-01-24 PROBLEM — R06.02 SOB (SHORTNESS OF BREATH): Status: ACTIVE | Noted: 2022-01-01

## 2022-01-24 PROBLEM — I27.20 PULMONARY HTN (HCC): Status: ACTIVE | Noted: 2022-01-01

## 2022-01-24 PROBLEM — Z87.891 FORMER SMOKER: Status: ACTIVE | Noted: 2022-01-01

## 2022-01-24 NOTE — PROGRESS NOTES
Report called to [facility], patient on high flow at 12 liters, pt placed back on non-reabreather. Call placed to Dr. Marian Garnett he is coming to see patient.

## 2022-01-24 NOTE — PROGRESS NOTES
Hospitalist Progress Note      PCP: Gretta Carrasco MD    Date of Admission: 1/22/2022    Chief Complaint: tachycardia, SOB    Hospital Course:   67 y.o. male, with PMH of atrial fibrillation, HTN, CAD and HLD, who presented to [facility] with tachycardia and shortness of breath. History obtained from the patient and review of EMR. Patient stated he has been experiencing generalized weakness since November. He stated he has also been experiencing an increase in shortness of breath for the last 1 to 2 months. The patient does have a history of bronchiectasis and follows with pulmonology. He stated he usually wears 3 L nasal cannula of supplemental oxygen at home at baseline, but has had to increase it recently to 6 L nasal cannula. The patient stated he also has a history of tachycardia/atrial fibrillation. He stated he follows with Dr. Feli Almaguer from cardiology and Dr. Joselyn Gibson from EP. The patient stated when he has episodes of tachycardia and rest for the last 10 to 13 seconds and resolves on its own. However, the patient stated over the last week or so his tachycardia has not been resolving. He stated today his heart rate went as high as 150 so he went to the emergency room for further evaluation. In the emergency room a twelve-lead EKG was done that did confirm he was in atrial fibrillation with RVR. He was given 1 L of normal saline. The patient will be admitted for further evaluation and treatment. He denied any other associated symptoms as well as any aggravating and/or alleviating factors. At the time of this assessment, the patient was resting comfortably in bed. He currently denies any chest pain, back pain, abdominal pain, shortness of breath, numbness, tingling, N/V/C/D, fever and/or chills. Subjective: S/p bronch. Having some increased SOB since the procedure. HR improved overnight.       Medications:  Reviewed    Infusion Medications    sodium chloride 25 mL (01/24/22 5282) Scheduled Medications    meropenem  1,000 mg IntraVENous Q8H    enoxaparin  40 mg SubCUTAneous Daily    dilTIAZem  120 mg Oral Daily    aspirin  81 mg Oral Daily    atorvastatin  40 mg Oral Daily    ferrous sulfate  325 mg Oral BID    FLUoxetine  10 mg Oral Daily    metoprolol tartrate  50 mg Oral BID    budesonide-formoterol  1 puff Inhalation BID    sodium chloride flush  5-40 mL IntraVENous 2 times per day     PRN Meds: albuterol, diphenhydrAMINE, sodium chloride flush, sodium chloride, ondansetron **OR** ondansetron, polyethylene glycol, acetaminophen **OR** acetaminophen, perflutren lipid microspheres      Intake/Output Summary (Last 24 hours) at 1/24/2022 1524  Last data filed at 1/23/2022 1916  Gross per 24 hour   Intake 120 ml   Output 450 ml   Net -330 ml       Physical Exam Performed:    /63   Pulse 117   Temp 97.1 °F (36.2 °C) (Temporal)   Resp 22   Ht 5' 8\" (1.727 m)   Wt 180 lb (81.6 kg)   SpO2 92%   BMI 27.37 kg/m²     General appearance: No apparent distress, appears stated age and cooperative. HEENT: Pupils equal, round, and reactive to light. Conjunctivae/corneas clear. Neck: Supple, with full range of motion. No jugular venous distention. Trachea midline. Respiratory:  Normal respiratory effort. Clear to auscultation, bilaterally without Rales/Wheezes/Rhonchi. Cardiovascular: Regular rate and rhythm with normal S1/S2 without murmurs, rubs or gallops. Abdomen: Soft, non-tender, non-distended with normal bowel sounds. Musculoskeletal: No clubbing, cyanosis or edema bilaterally. Full range of motion without deformity. Skin: Skin color, texture, turgor normal.  No rashes or lesions. Neurologic:  Neurovascularly intact without any focal sensory/motor deficits.  Cranial nerves: II-XII intact, grossly non-focal.  Psychiatric: Alert and oriented, thought content appropriate, normal insight  Capillary Refill: Brisk,3 seconds, normal   Peripheral Pulses: +2 palpable, equal bilaterally       Labs:   Recent Labs     01/22/22  1702   WBC 16.5*   HGB 9.9*   HCT 30.9*   *     Recent Labs     01/22/22  1702 01/23/22  0940    137   K 5.4* 4.5   CL 98* 101   CO2 26 24   BUN 24* 22*   CREATININE 0.9 0.8   CALCIUM 9.8 9.3     Recent Labs     01/22/22  1702   AST 34   ALT 21   BILITOT 0.5   ALKPHOS 148*     Recent Labs     01/23/22  0940   INR 1.44*     Recent Labs     01/22/22  1702   TROPONINI <0.01       Urinalysis:      Lab Results   Component Value Date    NITRU Negative 06/23/2014    WBCUA 0-2 12/08/2013    BACTERIA 1+ 08/07/2010    RBCUA 0-2 12/08/2013    BLOODU Negative 06/23/2014    SPECGRAV 1.010 06/23/2014    GLUCOSEU Negative 06/23/2014    GLUCOSEU NEGATIVE 08/07/2010       Radiology:  XR CHEST PORTABLE   Final Result   Chronic appearing reticulonodular opacities. 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 Continue merrem  - continue home inhalers  - s/p bronch. BAL pending  - wean O2 as able.  On 3L NC at baseline  - no need for steroids at present    HTN  - well controlled  - continue metoprolol, cardizem    HLD  - continue home statin    Anemia  - chronic, stable  - continue to monitor    DVT Prophylaxis: lovenox  Diet: Diet NPO  Code Status: Full Code    PT/OT Eval Status: ordered    Dispo - HHC vs SNF in 2-4 days    Rachelle Rosario MD

## 2022-01-24 NOTE — PROGRESS NOTES
Occupational Therapy   Occupational Therapy Initial Assessment/Treatment  Date: 2022   Patient Name: Jyoti Baer  MRN: [id number]     : 1945    Date of Service: 2022    Discharge Recommendations:  Subacute/Skilled Nursing Facility       Assessment   Performance deficits / Impairments: Decreased functional mobility ; Decreased safe awareness;Decreased balance;Decreased ADL status; Decreased endurance;Decreased strength;Decreased high-level IADLs  Assessment: Patient admitted from home with SOB, requires O2 at baseline, rather IPTA for ADLs and mobility at home. Today patient Valentina to stand from EOB for only 15 secs to pull up briefs, pt with increasing SOB and belly breathing noted. O2 sats dropping into 80s. At this time patient would benefit from SNF at discharge. Prognosis: Good  Decision Making: High Complexity  OT Education: OT Role;Transfer Training;Plan of Care;ADL Adaptive Strategies; Energy Conservation; Family Education  Patient Education: Disease specifics: Patient educated on pursed lip breathing to aid in recovery and energy conservation s/p therapeutic activity as related to patient's respiratory condition. Patient verbalized understanding of above education. REQUIRES OT FOLLOW UP: Yes  Safety Devices  Safety Devices in place: Yes  Type of devices: Gait belt;Bed alarm in place;Nurse notified; Left in bed;Call light within reach           Patient Diagnosis(es): The primary encounter diagnosis was Tachycardia. Diagnoses of Shortness of breath and Paroxysmal atrial fibrillation ([address] were also pertinent to this visit.      has a past medical history of Acute bronchitis, Adrenal adenoma, Atrial fibrillation ([address] Atrial fibrillation ([address] Benign neoplasm of colon, Calculus of kidney, Colonic polyps, Diverticulitis, Diverticulitis of colon (without mention of hemorrhage)(562.11), Fatty liver, Hyperlipidemia, Kidney stone, Other and unspecified hyperlipidemia, Other chronic nonalcoholic liver disease, Palpitations, Renal cyst, Steatosis, liver, and Tobacco use disorder. has a past surgical history that includes Colonoscopy (7/08); doppler echocardiography (6/09); cardiovascular stress test (10/07, 10/05); liver biopsy (3/07); and Kidney stone surgery (6/05). Restrictions  Restrictions/Precautions  Restrictions/Precautions: Fall Risk,Up as Tolerated  Position Activity Restriction  Other position/activity restrictions: 4L O2    Subjective   General  Chart Reviewed: Yes,Orders,Progress Notes,History and Physical  Patient assessed for rehabilitation services?: Yes  Family / Caregiver Present: Yes (chapo)  Referring Practitioner: Lashanda Garcia MD 1/24/22  Diagnosis: SOB  Subjective  Subjective: Pt pleasant and agreeable to OT evaluation, states has been progressively getting SOB. Per chapo that was present this has been going on for the past 6 months. Patient Currently in Pain: Denies  Pain Assessment  Pain Assessment: 0-10  Pain Level: 0  Patient's Stated Pain Goal: No pain  Vital Signs  Temp: 97.7 °F (36.5 °C)  Temp Source: Oral  Pulse: 108  Heart Rate Source: Monitor  Resp: 26  BP: 110/71  BP Location: Right upper arm  Patient Position: Semi fowlers  Level of Consciousness: Alert (0)  MEWS Score: 4  Patient Currently in Pain: Denies  Oxygen Therapy  SpO2: 98 %  Pulse Oximeter Device Mode: Intermittent  Pulse Oximeter Device Location: Finger  O2 Device: High flow nasal cannula  O2 Flow Rate (L/min): 7 L/min  Social/Functional History  Social/Functional History  Lives With: Alone  Type of Home: House  Home Layout: One level  Home Access: Stairs to enter without rails  Bathroom Shower/Tub: Tub/Shower unit  Bathroom Toilet: Standard  Bathroom Equipment: Shower chair,Hand-held shower  Home Equipment: Oxygen,Cane,4 wheeled walker (4L continuous)  ADL Assistance: Independent (with increased time. Having to sit to dry off and change clothes.  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  Leisure & Hobbies: watch [employer]  Additional Comments: 2 falls in the past 6 months with no injury       Objective   Vision: Impaired  Vision Exceptions: Wears glasses at all times  Hearing: Exceptions to [facility]  Hearing Exceptions: Hard of hearing/hearing concerns    Orientation  Overall Orientation Status: Within Functional Limits     Balance  Sitting Balance: Stand by assistance (with 1 LOB to L d/t fatigue)  Standing Balance: Minimal assistance (from PT, while OT assisting with LE clothing mgmt)  Standing Balance  Time: 15 secs  Activity: LE clothing mgmt  Comment: Valentina for balance-increased SOB w/ minimal activity  ADL  Feeding: Setup  LE Dressing: Maximum assistance (with briefs)  Toileting: Maximum assistance     Tone RUE  RUE Tone: Normotonic  Tone LUE  LUE Tone: Normotonic  Coordination  Movements Are Fluid And Coordinated: Yes     Bed mobility  Supine to Sit: Stand by assistance  Sit to Supine: Contact guard assistance     Transfers  Sit to stand: Minimal assistance  Stand to sit: Minimal assistance     Cognition  Overall Cognitive Status: WFL  Cognition Comment: minimally unaware of deficits as he has been dealing with increasing SOB for the past 6 months.         LUE AROM (degrees)  LUE AROM : WFL  RUE AROM (degrees)  RUE AROM : WFL  LUE Strength  Gross LUE Strength: WFL  RUE Strength  Gross RUE Strength: WFL      Plan   Plan  Times per week: 3-5x's a week while in acute care             AM-PAC Score        AM-PAC Inpatient Daily Activity Raw Score: 15 (01/24/22 1722)  AM-PAC Inpatient ADL T-Scale Score : 34.69 (01/24/22 1722)  ADL Inpatient CMS 0-100% Score: 56.46 (01/24/22 1722)  ADL Inpatient CMS G-Code Modifier : CK (01/24/22 1722)    Goals  Short term goals  Time Frame for Short term goals: 1 week unless otherwise specificed 1/31  Short term goal 1: Pt will complete LE dressing with Valentina  Short term goal 2: Pt will complete toilet transfer with Valentina  Short term goal 3: Pt will complete standing level ADLs with CGA for balance  Short term goal 4: Pt will tolerated 2 mins of standing level activity before requiring seated rest break. Short term goal 5: Pt will complete 10 reps of BUE AROM exercises to increase strength for ADLs/transfers by 1/29  Patient Goals   Patient goals : \"to get better \"       Therapy Time   Individual Concurrent Group Co-treatment   Time In 1640         Time Out 1715         Minutes 35         Timed Code Treatment Minutes: 25 Minutes       Dea Mora OTR/L  If pt is unable to be seen after this session, please let this note serve as discharge summary. Please see case management note for discharge disposition. Thank you.

## 2022-01-24 NOTE — PROGRESS NOTES
Physical Therapy    Facility/Department: [facility] - MED SURG  Initial Assessment    NAME: Olive Hernandez  : 1945  MRN: [id number]    Date of Service: 2022    Discharge Recommendations:  Subacute/Skilled Nursing Facility   PT Equipment Recommendations  Equipment Needed: No  Other: TBD at SNF    Assessment   Body structures, Functions, Activity limitations: Decreased functional mobility ; Decreased strength;Decreased endurance;Decreased balance;Decreased posture  Assessment: Pt referred for PT evaluation during current hospital stay with dx of tachycardia, A fib with RVR, and acute-on-chronic respiratory failure. Pt very limited in endurance and stamina during PT evaluation today, requiring Valentina x 1 for sit<>stand transfers and being unable to stand for periods >15-20 seconds at a time due to SOB. Pt too weak/SOB to attempt bed>chair or amb today. Recommend SNF at D/C in light of current deficits. Treatment Diagnosis: Decreased endurance and (I) with mobility  Specific instructions for Next Treatment: Progress ther ex and mobility as tolerated  Prognosis: Good  Decision Making: Medium Complexity  PT Education: Goals; General Safety;PT Role;Plan of Care;Family Education;Disease Specific Education; Functional Mobility Training;Equipment;Precautions;Transfer Training;Energy Conservation  Patient Education: Disease-specific education: Pt educated on role of PT in hospital and pursed-lip breathing techniques during rest breaks; pt verbalizes understanding. REQUIRES PT FOLLOW UP: Yes  Activity Tolerance: Patient limited by endurance  Activity Tolerance: Vitals during session on 7L high-flow O2: BP = 110/71, HR = 108 bpm, O2 sat = 97-98%. O2 sats decreased to the mid 80s at EOB and then to as low as 81% after sit<>stand attempt. O2 sats rebounded to 87-88% after ~4 minutes of rest in supine. RN aware. Patient Diagnosis(es): The primary encounter diagnosis was Tachycardia.  Diagnoses of Shortness of breath and Paroxysmal atrial fibrillation (HCC) were also pertinent to this visit. has a past medical history of Acute bronchitis, Adrenal adenoma, Atrial fibrillation ([address] Atrial fibrillation ([address] Benign neoplasm of colon, Calculus of kidney, Colonic polyps, Diverticulitis, Diverticulitis of colon (without mention of hemorrhage)(562.11), Fatty liver, Hyperlipidemia, Kidney stone, Other and unspecified hyperlipidemia, Other chronic nonalcoholic liver disease, Palpitations, Renal cyst, Steatosis, liver, and Tobacco use disorder. has a past surgical history that includes Colonoscopy (7/08); doppler echocardiography (6/09); cardiovascular stress test (10/07, 10/05); liver biopsy (3/07); and Kidney stone surgery (6/05). Restrictions  Restrictions/Precautions  Restrictions/Precautions: Fall Risk,Up as Tolerated,General Precautions  Position Activity Restriction  Other position/activity restrictions: 7L high-flow O2, telemetry with continuous pulse ox  Vision/Hearing  Vision: Impaired  Vision Exceptions: Wears glasses at all times  Hearing: Exceptions to [facility] Exceptions: Hard of hearing/hearing concerns     Subjective  General  Chart Reviewed: Yes  Patient assessed for rehabilitation services?: Yes  Family / Caregiver Present: Yes (niece)  Referring Practitioner: Dr. Aston Berg  Referral Date : 01/24/22  Diagnosis: Tachycardia, A fib with RVR, acute-on-chronic respiratory failure  Follows Commands: Within Functional Limits  General Comment  Comments: Pt resting in bed upon entry of PT  Subjective  Subjective: Pt agreeable to work with PT this afternoon, although requests not to do too much 2* SOB and increased work of breathing with activity.   Pain Screening  Patient Currently in Pain: Denies  Intervention List: Patient able to continue with treatment    Orientation  Orientation  Overall Orientation Status: Within Normal Limits     Social/Functional History  Social/Functional History  Lives With: Alone  Type of Home: House  Home Layout: One level  Home Access: Stairs to enter without rails  Bathroom Shower/Tub: Tub/Shower unit  Bathroom Toilet: Standard  Bathroom Equipment: Shower chair,Hand-held shower  Home Equipment: Oxygen,Cane,4 wheeled walker (4L continuous)  ADL Assistance: Independent (with increased time. Having to sit to dry off and change clothes. 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  Leisure & Hobbies: watch "[employer]ube  Additional Comments: 2 falls in the past 6 months with no injury    Objective     Observation/Palpation  Posture: Fair  Observation: Kyphotic upper trunk, rounded shoulders, forward head    AROM RLE (degrees)  RLE AROM: WFL  AROM LLE (degrees)  LLE AROM : WFL  Strength RLE  Strength RLE: WFL  Strength LLE  Strength LLE: WFL     Bed mobility  Supine to Sit: Stand by assistance  Sit to Supine: Contact guard assistance  Scooting: Dependent/Total (to scoot up in bed)     Transfers  Sit to Stand: Minimal Assistance  Stand to sit: Minimal Assistance  Bed to Chair: Unable to assess (pt too SOB to attempt this session)     Ambulation  Ambulation?: No (pt too SOB to attempt today)     Balance  Posture: Fair  Sitting - Static: Good;-  Sitting - Dynamic: Fair  Standing - Static: Fair;-  Standing - Dynamic: Fair;-  Comments: Pt required Valentina x 1 for standing balance while donning underwear while OT assisted with dressing tasks (see OT note for details).   Pt only able to stand for ~15-20 seconds becoming too SOB to continue and needing to sit/rest.        Plan   Times per week: 3-5x/week in acute care  Times per day: Daily  Specific instructions for Next Treatment: Progress ther ex and mobility as tolerated  Current Treatment Recommendations: Strengthening,Balance Training,Endurance Training,Functional Mobility Training,Transfer Training,Gait Training,Home Exercise Program,Safety Education & Training,Patient/Caregiver Education & Training,Equipment Evaluation, Education, & procurement  Safety Devices: All fall risk precautions in place,Bed alarm in place,Nurse notified,Gait belt,Patient at risk for falls,Left in bed,Call light within reach    AM-PAC Score  AM-PAC Inpatient Mobility Raw Score : 13 (01/24/22 1754)  AM-PAC Inpatient T-Scale Score : 36.74 (01/24/22 1754)  Mobility Inpatient CMS 0-100% Score: 64.91 (01/24/22 1754)  Mobility Inpatient CMS G-Code Modifier : CL (01/24/22 1754)    Goals  Short term goals  Time Frame for Short term goals: 1 week, 1/31/22 (unless otherwise specified)  Short term goal 1: Pt will transfer supine <-> sit with supervision  Short term goal 2: Pt will transfer sit <-> stand and bed>chair using RW or least AD with supervision  Short term goal 3: Pt will ambulate x 60 feet using RW or least AD with SBA  Short term goal 4: By 1/26/22: Pt will tolerate 12-15 reps BLE exercise for strengthening, balance, and endurance  Patient Goals   Patient goals : \"To get back to my normal activities at home\"       Therapy Time   Individual Concurrent Group Co-treatment   Time In 1640         Time Out 1715         Minutes 35         Timed Code Treatment Minutes: [address], [place] #[id number]    If pt is unable to be seen after this session, please let this note serve as discharge summary. Please see case management note for discharge disposition. Thank you.

## 2022-01-25 PROBLEM — J15.5 PNEUMONIA OF BOTH LOWER LOBES DUE TO ESCHERICHIA COLI (HCC): Status: ACTIVE | Noted: 2022-01-01

## 2022-01-25 NOTE — DISCHARGE INSTR - COC
Continuity of Care Form    Patient Name: Adalberto Ny   :    MRN:  [id number]    Admit date:  2022  Discharge date:  2022    Code Status Order: Full Code   Advance Directives:   [facility] Documentation       Date/Time Healthcare Directive Type of Healthcare Directive Copy in [address]  [address] Agent's Name Healthcare Agent's Phone Number    22 1008 No, patient does not have an advance directive for healthcare treatment -- -- -- -- --            Admitting Physician:  Harpal Hubbard MD  PCP: Sean Russ MD    Discharging Nurse: [facility] Unit/Room#: 2705/9154-02  Discharging Unit Phone Number: ***    Emergency Contact:   Extended Emergency Contact Information  Primary Emergency Contact: [address] Phone: [phone]  Relation: Niece/Nephew    Past Surgical History:  Past Surgical History:   Procedure Laterality Date    CARDIOVASCULAR STRESS TEST  10/07, 10/05    stress echo-normal, negative    COLONOSCOPY      polyps and diverticuli, tubular adenoma    DOPPLER ECHOCARDIOGRAPHY      moderate in crease LA, otherwise ( - )    KIDNEY STONE SURGERY      LIVER BIOPSY  3/07    fatty liver - no autoimmune hepatits       Immunization History:   Immunization History   Administered Date(s) Administered    Influenza Virus Vaccine 10/09/2012, 10/08/2013, 2014       Active Problems:  Patient Active Problem List   Diagnosis Code    Atrial fibrillation ([facility] [address] I48.91    Acute bronchitis J20.9    Smoking F17.200    MCALLISTER K76.89    Diverticulitis of colon K57.32    Calculus of kidney N20.0    Benign neoplasm of colon D12.6    HYPERLIPIDEMIA E78.5    Steatosis, liver K76.0    Adrenal mass ([facility] [address] E27.8    HTN (hypertension) I10    Diplopia H53.2    Sleep apnea G47.30    Coronary atherosclerosis of native coronary artery I25.10    S/P CABG x 4 Z95.1    Acute blood loss anemia D62    Tachycardia R00.0    SOB (shortness of RDDR:371242895}    Routes of Feeding: {CHP DME Other Feedings:243947872}  Liquids: {Slp liquid thickness:63871}  Daily Fluid Restriction: {CHP DME Yes amt example:600704850}  Last Modified Barium Swallow with Video (Video Swallowing Test): {Done Not Done UMCK:523384080}    Treatments at the Time of Hospital Discharge:   Respiratory Treatments: ***  Oxygen Therapy:  {Therapy; copd oxygen:53904}  Ventilator:    { CC Vent ZHMO:742116403}    Rehab Therapies: {THERAPEUTIC INTERVENTION:8063976179}  Weight Bearing Status/Restrictions: { CC Weight Bearin}  Other Medical Equipment (for information only, NOT a DME order):  {EQUIPMENT:608113323}  Other Treatments: ***    Patient's personal belongings (please select all that are sent with patient):  {CHP DME Belongings:760208857}    RN SIGNATURE:  {Esignature:830402084}    CASE MANAGEMENT/SOCIAL WORK SECTION    Inpatient Status Date: 22    Readmission Risk Assessment Score:  Readmission Risk              Risk of Unplanned Readmission:  17           Discharging to Facility/ Agency   Name: [facility]   Address:  Phone: [phone]  Fax: [phone]    / signature: Electronically signed by Suresh Foster RN on 22 at 3:16 PM EST    PHYSICIAN SECTION    Prognosis: Good    Condition at Discharge: Stable    Rehab Potential (if transferring to Rehab): Good    Recommended Labs or Other Treatments After Discharge:       Recommended Follow-up, Labs or Other Treatments After Discharge:      ID INFUSION ORDERS  Ertapenem 1g IV q24 continued through 22 - CBC diff, CMP faxed to [phone]  Routine CVC care  Call with ID related concerns and see Dr. Rudy Sanchez in 4-6 weeks, [phone]   Shmuel Maddox MD             Physician Certification: I certify the above information and transfer of Ricco Larry  is necessary for the continuing treatment of the diagnosis listed and that he requires Cristian Cohen for less 30 days.     Update Admission H&P: No change in H&P    PHYSICIAN SIGNATURE:  Electronically signed by Ángel Petit MD on 2/2/22 at 1:22 PM EST

## 2022-01-25 NOTE — PROGRESS NOTES
Hospitalist Progress Note      PCP: Allan Hagen MD    Date of Admission: 1/22/2022    Chief Complaint: tachycardia, SOB    Hospital Course:   67 y.o. male, with PMH of atrial fibrillation, HTN, CAD and HLD, who presented to [facility] with tachycardia and shortness of breath. Pt stated he has been experiencing generalized weakness since November. He stated he has also been experiencing an increase in shortness of breath for the last 1 to 2 months. The pt does have a history of bronchiectasis and follows with pulmonology. He stated he usually wears 3 L nasal cannula of supplemental oxygen at home at baseline, but has had to increase it recently to 6 L nasal cannula. The pt stated he also has a history of tachycardia/atrial fibrillation. He stated he follows with Dr. Ricardo Torres from cardiology and Dr. Cassandra Chambers from EP. The pt stated when he has episodes of tachycardia and rest for the last 10 to 13 seconds and resolves on its own. However, the patient stated over the last week or so his tachycardia has not been resolving. He stated today his heart rate went as high as 150 so he went to the ED for further evaluation. In the ED a twelve-lead EKG was done that did confirm he was in atrial fibrillation with RVR. He was given 1 L of normal saline. He denied any other associated symptoms as well as any aggravating and/or alleviating factors. At the time of this assessment, the patient was resting comfortably in bed. He currently denies any chest pain, back pain, abdominal pain, shortness of breath, numbness, tingling, N/V/C/D, fever and/or chills. Subjective:   Pt is currently on BiPAP. VSS. Afebrile. +dyspnea. No other specific complaints.        Medications:  Reviewed    Infusion Medications    sodium chloride 25 mL (01/25/22 0927)     Scheduled Medications    dilTIAZem  120 mg Oral BID    meropenem  1,000 mg IntraVENous Q8H    melatonin  5 mg Oral Nightly    enoxaparin  40 mg SubCUTAneous Daily    aspirin 81 mg Oral Daily    atorvastatin  40 mg Oral Daily    ferrous sulfate  325 mg Oral BID    FLUoxetine  10 mg Oral Daily    metoprolol tartrate  50 mg Oral BID    budesonide-formoterol  1 puff Inhalation BID    sodium chloride flush  5-40 mL IntraVENous 2 times per day     PRN Meds: albuterol, diphenhydrAMINE, sodium chloride flush, sodium chloride, ondansetron **OR** ondansetron, polyethylene glycol, acetaminophen **OR** acetaminophen, perflutren lipid microspheres      Intake/Output Summary (Last 24 hours) at 1/25/2022 1447  Last data filed at 1/25/2022 0443  Gross per 24 hour   Intake    Output 450 ml   Net -450 ml       Physical Exam Performed:    /72   Pulse 88   Temp 97.5 °F (36.4 °C) (Axillary)   Resp 27   Ht 5' 8\" (1.727 m)   Wt 180 lb (81.6 kg)   SpO2 97%   BMI 27.37 kg/m²     General appearance: No apparent distress, appears stated age and cooperative. HEENT: Pupils equal, round, and reactive to light. Conjunctivae/corneas clear. Neck: Supple, with full range of motion. No jugular venous distention. Trachea midline. Respiratory:  Normal respiratory effort. Clear anterior breath sounds   Cardiovascular: Regular rate and rhythm with normal S1/S2 without murmurs, rubs or gallops. Abdomen: Soft, non-tender, non-distended with normal bowel sounds. Musculoskeletal: No clubbing, cyanosis or edema bilaterally. Full range of motion without deformity. Skin: Skin color, texture, turgor normal.  No rashes or lesions. Neurologic:  Neurovascularly intact without any focal sensory/motor deficits.  Cranial nerves: II-XII intact, grossly non-focal.  Psychiatric: Alert and oriented, thought content appropriate, normal insight  Capillary Refill: Brisk,3 seconds, normal   Peripheral Pulses: +2 palpable, equal bilaterally       Labs:   Recent Labs     01/22/22  1702 01/25/22  1022   WBC 16.5* 11.7*   HGB 9.9* 8.0*   HCT 30.9* 25.5*   * 363     Recent Labs     01/22/22  1702 01/23/22  0940 01/25/22  1022    137 142   K 5.4* 4.5 4.2   CL 98* 101 104   CO2 26 24 27   BUN 24* 22* 28*   CREATININE 0.9 0.8 0.7*   CALCIUM 9.8 9.3 9.0     Recent Labs     01/22/22  1702   AST 34   ALT 21   BILITOT 0.5   ALKPHOS 148*     Recent Labs     01/23/22  0940   INR 1.44*     Recent Labs     01/22/22  1702   TROPONINI <0.01       Urinalysis:      Lab Results   Component Value Date    NITRU Negative 06/23/2014    WBCUA 0-2 12/08/2013    BACTERIA 1+ 08/07/2010    RBCUA 0-2 12/08/2013    BLOODU Negative 06/23/2014    SPECGRAV 1.010 06/23/2014    GLUCOSEU Negative 06/23/2014    GLUCOSEU NEGATIVE 08/07/2010       Radiology:  XR CHEST PORTABLE   Final Result   Chronic appearing reticulonodular opacities. 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 BAL grew E.  Coli -- f/u sensitivities; cytology negative for malignancy, fungal forms or PCP  - PCT elevated, continue IV merrem  - continue home inhalers  - wean O2 as able, on 3L NC at baseline, currently requiring BiPAP  - no need for steroids at present    HTN  - well controlled  - continue metoprolol, cardizem    HLD  - continue home statin    Anemia, chronic  - H&H relatively stable  - no overt signs of bleeding  - continue to monitor      DVT Prophylaxis: lovenox  Diet: ADULT DIET;  Regular  Code Status: Full Code    PT/OT Eval Status: ordered    Dispo - [facility] vs SNF in 2-4 days    Jose R Jade, APRN - CNP

## 2022-01-25 NOTE — PROGRESS NOTES
01/25/22 0733   NIV Type   Skin Protection for O2 Device Yes   Location Nose   Equipment Type v60   Mode Bilevel   Mask Type Full face mask   Mask Size Medium   Settings/Measurements   IPAP 12 cmH20   CPAP/EPAP 6 cmH2O   Rate Ordered 12   Resp 28   FiO2  50 %   I Time/ I Time % 1 s   Vt Exhaled 636 mL   Minute Volume 22.5 Liters   Mask Leak (lpm) 5 lpm   Comfort Level Good   Using Accessory Muscles No   SpO2 96   Alarm Settings   Alarms On Y   Press Low Alarm 6 cmH2O   High Pressure Alarm 330 cmH2O   Apnea (secs) 20 secs   Resp Rate Low Alarm 6   High Respiratory Rate 45 br/min   Oxygen Therapy/Pulse Ox   O2 Therapy Oxygen   O2 Device PAP (positive airway pressure)

## 2022-01-25 NOTE — CONSULTS
[address] [address]                                  CONSULTATION    PATIENT NAME: Laura Juarez                   :        1945  MED REC NO:   [id number]                          ROOM:       [id number]  ACCOUNT NO:   [de-identified]                           ADMIT DATE: 2022  PROVIDER:     Caitlyn Saleh MD    CONSULT DATE:  2022    CARDIAC ELECTROPHYSIOLOGY CONSULTATION    REASON FOR CONSULTATION:  Atrial fibrillation. HISTORY OF PRESENT ILLNESS:  The patient is a 50-year-old man with  history of advanced lung disease, coronary artery disease, and  persistent atrial fibrillation who is admitted to [facility] on 2022 with complaints of shortness of breath and  weakness. At the time of admission, he is found to be hypoxic and has  required high flow oxygen. He is currently on BiPAP with an FIO2 of  60%. He has history of coronary artery bypass surgery in 2013 at  [facility] that was accompanied by left atrial appendage  ligation. He has had atrial fibrillation since that time and he has  been on amiodarone since that time, a dose which has ranged from 100 to  400 mg/day. This was discontinued on 2022. Most recent chest  x-ray from 2022 demonstrates bilateral reticulonodular opacities  which do not appear to be much changed. Chest CT from 10/13/2021 from  [facility] demonstrates diffuse pulmonary nodules with evidence of  fibrosis as well as some cavitary nodules which were likely to be  infectious in origin, neoplastic activity cannot be excluded. He  underwent bronchoscopy on 2022 which demonstrated extensive  purulent material in the tracheobronchial tree. At that time, he had  documentation of the E. coli and pseudomonas aeruginosa in his sputum  culture as well as candida tropicalis and Paecilomyces lilacinus.   He  has had persistent atrial fibrillation since mid November. At the time  of admission, 12-lead ECG demonstrates atrial fibrillation with an  average ventricular rate of 105 beats per minute. It does appear that  he was in sinus rhythm in early November. The patient is unaware of  palpitations. He does report some increased heart rate with ambulation. Echo from 01/22/2022 demonstrates preserved left ventricular function  with ejection fraction of 55% and pulmonary artery pressure of 28 mmHg. The right ventricle appears to be enlarged with septal flattening in  diastole. PAST MEDICAL HISTORY:  1. COPD with pulmonary fibrosis. 2.  Atrial fibrillation. 3.  Coronary artery disease status post remote coronary artery bypass  surgery. 4.  Hyperlipidemia. 5.  Nephrolithiasis. MEDICATIONS:  At the time of admission include amiodarone 200 mg p.o.  daily, aspirin 81 mg p.o. daily, Lipitor 40 mg p.o. daily, Atrovent  inhaler 1 puff t.i.d., doxycycline 50 mg p.o. daily, iron sulfate 325 mg  p.o. b.i.d., Prozac 10 mg p.o. daily, Advair discus 250/50 one puff  q.12h., Lasix 40 mg p.o. daily, metoprolol tartrate 50 mg p.o. b.i.d.,  potassium chloride 10 mEq p.o. t.i.d., rivaroxaban 20 mg p.o. daily,  Zocor 20 mg p.o. daily. ALLERGIES:  None known. SOCIAL HISTORY:  The patient is a former smoker, having stopped smoking  2015. He does not consume alcohol at this time. FAMILY HISTORY:  Remarkable for thyroid disease in the mother. There is  history of macular degeneration in one sister. There is no known family  history of cardiac rhythm disturbance, syncope or sudden cardiac death. REVIEW OF SYSTEMS:  Demonstrates no recent change in appetite or change  in weight. The patient has not had recent fever or chills. He describe  the above-mentioned increasing shortness of breath and weakness. He  does not describe palpitations generally, but does occasionally notice  some elevated heart rate with ambulation.   He has not had chest pain. He has not had near-syncope or syncope. His functional status is  limited by shortness of breath. All other components of the 14-system  review are negative. PHYSICAL EXAMINATION:  VITAL SIGNS:  Blood pressure is 114/77, heart rate is 105 beats per  minute and irregular. The patient is afebrile. GENERAL:  He is awake and alert. He is able to answer some questions,  but communication is inhibited by BiPAP. HEENT:  Exam demonstrates normocephalic, atraumatic head. There is no  scleral icterus. Pupils are round and reactive. NECK:  Supple without thyromegaly. LUNGS:  Exam demonstrates diminished breath sounds bilaterally. There  are inspiratory rhonchi left greater than right. There is no clear  consolidation. There is no wheezing. CARDIOVASCULAR:  Exam reveals an irregular rhythm. The apical impulse  is discrete. S1 and S2 are normal.  There is no audible murmur or  gallop. Jugular venous pressure is difficult to assess. ABDOMEN:  Mildly obese, soft, and nontender. EXTREMITIES:  Demonstrate no pitting pretibial dependent edema. There  is no cyanosis. There is no evidence for chronic venous stasis. SKIN:  Otherwise, warm and dry without skin rash. DIAGNOSTIC DATA:  A 12-lead ECG from 01/24/2022 demonstrates atrial  fibrillation with an average ventricular rate of 121 beats per minute. There is incomplete right bundle-branch block with right axis deviation. There is no clear evidence of acute myocardial injury or ischemia. IMPRESSION:  1. Persistent atrial fibrillation. 2.  Pneumonia. 3.  COPD with pulmonary fibrosis. 4.  Coronary artery disease status post remote coronary artery bypass  surgery. The patient is admitted with progressive shortness of breath and  weakness. .  A chest CT and chest x-ray demonstrated extensive underlying  structural lung disease.   Bronchoscopy on 01/24/2022 demonstrates  extensive purulent material in the tracheobronchial tree which was  suctioned. It is unclear how much the atrial fibrillation contributes  to his symptoms. The rate control is not optimal and could be improved. He does not have elevated pulmonary artery systolic pressure by echo. Certainly, the amiodarone may be contributing to the abnormal  radiographic appearance of his lungs. This is difficult to determine  the presence of extensive underlying structural lung disease, but I do  think discontinuing the amiodarone will help clarify the situation. RECOMMENDATIONS:  1. Increase diltiazem to 120 mg p.o. b.i.d.  2.  Continue metoprolol tartrate 50 mg p.o. b.i.d.  3.  Continue supportive care and antibiotics for acute respiratory  failure. Thanks for the opportunity to assist in the care of the patient. Please  contact me, if you have any questions regarding his evaluation.         Elizabeth Hicks MD    D: 01/25/2022 10:21:32       T: 01/25/2022 10:26:59     [email or web address]  Job#: [id number]     Doc#: [id number]    CC:

## 2022-01-25 NOTE — CARE COORDINATION
CASE MANAGEMENT INITIAL ASSESSMENT    Reviewed chart and completed assessment with patient's niece, Thalia Adame, d/t patient being on bipap and asleep    Explained Case Management role/services. Health Care Decision Maker :   Primary Decision Maker: Cindy Lopez - Niece/Nephew - [phone]        Admit date/status: IPA 1/22/22  Diagnosis: afib     Is this a Readmission?:  No      Insurance: Medicare     Precert required for SNF: No       3 night stay required: No    Living arrangements, Adls, care needs, prior to admission: IPTA. Lives alone in apartment. Does not drive. Transportation: pt to arrange     Durable Medical Equipment at home:  Walker__Cane__RTS__ BSC__Shower Chair__  02__ HHN__ CPAP__  BiPap__  Hospital Bed__ W/C___ Other__________    Services in the home and/or outpatient, prior to admission: none    PT/OT recs: not completed at this time    [address] Notification (HEN): not started. Barriers to discharge: none     Plan/comments: Per patient's niece, pt will be agreeable to SNF at discharge. CM updated family that [facility] patient. But, pt will need to quarantine for 14 days upon arrival to facility d/t not being vaccinated for covid. CM team will continue to follow.      ECOC on chart for MD bharathi Valentine RN

## 2022-01-25 NOTE — PROGRESS NOTES
01/25/22 0445   NIV Type   Skin Assessment Clean, dry, & intact   Skin Protection for O2 Device Yes   Location Nose   Equipment Type v60   Mode Bilevel   Mask Type Full face mask   Mask Size Medium   Settings/Measurements   IPAP 12 cmH20   CPAP/EPAP 6 cmH2O   Resp 30   FiO2  50 %   Vt Exhaled 624 mL   Minute Volume 19.1 Liters   Mask Leak (lpm) 11 lpm   Comfort Level Good   Using Accessory Muscles No   SpO2 94

## 2022-01-25 NOTE — FLOWSHEET NOTE
External cath not working due to anatomy ru care provided. Pt voided 200 in urinal. On bipap. Denies further needs.

## 2022-01-25 NOTE — CARE COORDINATION
Call back received from Sujey Arellano at [facility]. States able to accept patient. Needed to know vaccination status for covid. CM verified with family pt IS NOT vaccinated. Therefore, will be required to quarantine for 14 days upon arrival to facility.

## 2022-01-25 NOTE — PROGRESS NOTES
INPATIENT PULMONARY CRITICAL CARE PROGRESS NOTE      Reason for visit    Bronchiectasis/hypoxemia    SUBJECTIVE: Patient when evaluated continues to have increased chest congestion patient was still having shortness of breath, patient when seen was on 6 L of high flow oxygen with saturation 92%, patient was afebrile, patient was in atrial fibrillation with variable ventricular rate, patient was hemodynamically maintained, patient urine output was borderline normal, patient's cumulative fluid balance was -1 L, patient was alert and communicative, no other pertinent review of system of concern        Physical Exam:  Blood pressure 128/82, pulse 119, temperature 98 °F (36.7 °C), temperature source Oral, resp. rate (!) 32, height 5' 8\" (1.727 m), weight 180 lb (81.6 kg), SpO2 96 %.'     Constitutional:  Mild respiratory distress with audible chest congestion   HENT:  Oropharynx is clear and moist. No thyromegaly. Eyes:  Conjunctivae are normal. Pupils equal, round, and reactive to light. No scleral icterus. pallor   Neck: . No tracheal deviation present. No obvious thyroid mass. Cardiovascular: s1s2 irregularly irregular , normal heart sounds. No right ventricular heave. No lower extremity edema. Pulmonary/Chest: No wheezes. B/L  Rales. Increased chest congestion   Chest wall is not dull to percussion. No accessory muscle usage or stridor. Decreased BSI with prolonged expiration   Abdominal: Soft. Bowel sounds present. No distension or hernia. No tenderness. Musculoskeletal: No cyanosis. No clubbing. No obvious joint deformity. Lymphadenopathy: No cervical or supraclavicular adenopathy. Skin: Skin is warm and dry. No rash or nodules on the exposed extremities. Psychiatric: Normal mood and affect. Behavior is normal.  No anxiety. Neurologic: Alert, awake and oriented. PERRL.   Speech fluent       Results:  CBC:   Recent Labs     01/22/22  1702   WBC 16.5*   HGB 9.9*   HCT 30.9*   MCV 91.4   * BMP:   Recent Labs     01/22/22  1702 01/23/22  0940    137   K 5.4* 4.5   CL 98* 101   CO2 26 24   BUN 24* 22*   CREATININE 0.9 0.8     LIVER PROFILE:   Recent Labs     01/22/22  1702   AST 34   ALT 21   BILITOT 0.5   ALKPHOS 148*     PT/INR:   Recent Labs     01/23/22  0940   PROTIME 16.5*   INR 1.44*       Imaging:  I have reviewed radiology images personally. XR CHEST PORTABLE   Final Result   Chronic appearing reticulonodular opacities. Echo Complete    Result Date: 1/24/2022  Transthoracic Echocardiography Report (TTE)  Demographics   Patient Name       Keila Valle   Date of Study      01/24/2022        Gender              Male   Patient Number     [id number]        Date of Birth       1945   Visit Number       [id number]         Age                 67 year(s)   Accession Number   [id number]        Room Number         0845   Corporate ID       [id number]          Sonographer         Jay Rea, [address] [facility]   Ordering Physician Bayron Armas, CNP  Interpreting        [address]           Michael Serrato MD, [facility]  Procedure Type of Study   TTE procedure:ECHOCARDIOGRAM COMPLETE 2D W DOPPLER W COLOR. Procedure Date Date: 01/24/2022 Start: 01:48 PM Study Location: [address] [place] [address] Media Quality: Limited visualization due to lung interface. Indications:Atrial fibrillation. Patient Status: Routine Height: 68 inches Weight: 180 pounds BSA: 1.95 m2 BMI: 27.37 kg/m2 BP: 112/63 mmHg  Conclusions   Summary  Limited visualization due to lung interference. Left ventricular systolic function is difficult to estimate but appears to  be grossly normal with an estimated ejection fraction of 55%. The left ventricle is normal in size with normal wall thickness. Flattened in diastole (\"D-shaped septum\") consistent with right ventricular  volume overload.   Elevated left atrial pressures with a septal E/e' ratio of 20.9. The right ventricle is not well visualized but appears dilated in size. Right ventricular systolic function is reduced. Moderate biatrial enlargement. Mild mitral and tricuspid regurgitation. Systolic pulmonary artery pressure (SPAP) is normal and estimated at 28 mmHg  (right atrial pressure 3 mmHg). Signature   ------------------------------------------------------------------  Electronically signed by Jhonatan Anton MD, [facility]  (Interpreting physician) on 01/24/2022 at 02:46 PM  ------------------------------------------------------------------   Findings   Left Ventricle  Left ventricular systolic function is difficult to estimate but appears to  be grossly normal with an estimated ejection fraction of 55%. The left ventricle is normal in size with normal wall thickness. Flattened in diastole (\"D-shaped septum\") consistent with right ventricular  volume overload. Elevated left atrial pressures with a septal E/e' ratio of 20.9. Mitral Valve  The mitral valve is normal in structure. Mild mitral regurgitation. Left Atrium  The left atrium appears moderately enlarged. Aortic Valve  The aortic valve appears normal in structure and function. There is no evidence of aortic regurgitation. Aorta  The aortic root is normal in size. Right Ventricle  The right ventricle is not well visualized but appears dilated in size. Right ventricular systolic function is reduced. TAPSE is measured at 8 mm. S velocity is measured at 4.64 cm/s. Tricuspid Valve  The tricuspid valve is normal in structure. Mild tricuspid regurgitation. Systolic pulmonary artery pressure (SPAP) is normal and estimated at 28 mmHg  (right atrial pressure 3 mmHg). Right Atrium  The right atrium is moderately enlarged. Right atrial area is 26.6 cm2. Right atrial volume is 44.4 ml/m2. Pulmonic Valve  The pulmonic valve leaflets are not well visualized.   No evidence of pulmonic regurgitation. Pericardial Effusion  No pericardial effusion noted. Pleural Effusion  No pleural effusion noted. Miscellaneous  No obvious masses, thrombi, or vegetations are noted. IVC is normal in size (<2.1 cm) and collapses > 50% with respiration  consistent with normal right atrial pressure (3 mmHg). M-Mode/2D Measurements (cm)   LV Diastolic Dimension: 3.81 cm LV Systolic Dimension: 9.02 cm  LV Septum Diastolic: 0.72 cm  LV PW Diastolic: 9.80 cm        AO Root Dimension: 3.6 cm                                  AV Cusp Separation: 2.4 cm                                  LA Dimension: 4.8 cm  LVOT: 2.2 cm                    LA Area: 25.8 cm2                                  LA volume/Index: 89 ml /46 ml/m2  Doppler Measurements   AV Peak Velocity: 95 cm/s     MV Peak E-Wave: 89.1 cm/s  AV Peak Gradient: 3.61 mmHg  LVOT Peak Velocity: 67.2 cm/s   TR Velocity:252 cm/s  TR Gradient:25.4 mmHg  Estimated RAP:3 mmHg  Estimated RVSP: 28 mmHg  E' Septal Velocity: 5.96 cm/s  E/E' ratio: 20.9   Aortic Valve   Peak Velocity: 95 cm/s  Peak Gradient: 3.61 mmHg   Cusp Separation: 2.4 cm  Aorta   Aortic Root: 3.6 cm  LVOT Diameter: 2.2 cm      XR CHEST PORTABLE    Result Date: 1/22/2022  EXAMINATION: ONE XRAY VIEW OF THE CHEST 1/22/2022 5:16 pm COMPARISON: 09/14/2020 HISTORY: ORDERING SYSTEM PROVIDED HISTORY: cough TECHNOLOGIST PROVIDED HISTORY: Reason for exam:->cough Reason for Exam: Shortness of Breath (pt to ED with c/o SOB and weakness since November. pt denies any chest pain reports some cough. on 6 L O2 at home at baseline) FINDINGS: The cardiomediastinal silhouette is unchanged in appearance. Status post median sternotomy. Bilateral reticulonodular opacities are without significant change compared to the exam in 2020. Blunting of the costophrenic angles also appears unchanged. No pneumothorax. The osseous structures are unchanged in appearance. Chronic appearing reticulonodular opacities. Assessment:  Active Problems:    HTN (hypertension)    S/P CABG x 4    Atrial fibrillation (HCC)    Tachycardia    SOB (shortness of breath)    Chest congestion    Pulmonary infiltrates    Lung nodules    Mediastinal adenopathy    Grade II diastolic dysfunction    Pulmonary HTN ([address]    H/O pneumonia due to Pseudomonas    Former smoker    Paecilomyces lilacinus infection    NICHOLAS (obstructive sleep apnea)    Leukocytosis  Resolved Problems:    * No resolved hospital problems.  *          Plan:   · Oxygen supplementation to keep saturation between 90 and 94% only  · Please titrate oxygen as per the above parameters  · Pulmonary toilet  · Patient has been told about the clinical findings/auscultation and also previous evaluations including a CT of the chest done at the [facility] along with findings/implications and options  · Patient has had a Pseudomonas and ESBL E. coli pneumonia along with that patient also has had Paecilomyces infection on the basis of sputum culture in the past and the implications were discussed  · Patient continues to have increased chest congestion with ineffective airway clearance and given the patient's clinical status and the previous evaluations patient was told about the options including a bronchoscopy for diagnostic and therapeutic purposes and patient was agreeable-we will arrange that for today  · On the base of the patient's previous cultures patient's IV antibiotic was changed from Levaquin to meropenem  · If patient has Paecilomyces infection -amphotericin B/posaconazole are options -we will hold off on that till final culture reports  · Patient is on Symbicort which can be continued  · Patient does not need any albuterol on a regular basis with Symbicort as it can cause patient to have more beta agonist effects including tachycardia-change to as needed basis  · Cardiac medications as per IM/cardiology   · May need speech evaluation   · PUD prophylaxis as per IM     Case discussed with patient and nursing     Further management depending on patient clinical status and the follow-up on above recommendations along with the bronchoscopy findings        Electronically signed by:  Toyin Jamil MD    1/24/2022    10:07 PM.

## 2022-01-25 NOTE — PROGRESS NOTES
Pt arrived to room 438 from B3. Family at bedside, [address] aware, pt on 12L HFNC see flowsheet for vital signs.

## 2022-01-25 NOTE — PROGRESS NOTES
Occupational Therapy/ Physical Therapy    Treatment Attempt Note    Attempted OT treatment this date, however pt on continuous bipap and declines therapy. Rn reports pt in process of being transferred to . Will re-attempt later or next date as schedule permits and pt medically stable.      Savita Farrell, OTR/GIGI Kevin PT, DPT

## 2022-01-25 NOTE — PROGRESS NOTES
01/25/22 0034   NIV Type   $NIV $Daily Charge   Skin Assessment Clean, dry, & intact   Skin Protection for O2 Device Yes   Location Nose   Equipment Type v60   Mode Bilevel   Mask Type Full face mask   Mask Size Medium   Settings/Measurements   IPAP 12 cmH20   CPAP/EPAP 6 cmH2O   Resp 28   FiO2  30 %   I Time/ I Time % 1 s   Vt Exhaled 556 mL   Minute Volume 23.6 Liters   Mask Leak (lpm) 12 lpm   Comfort Level Good   Using Accessory Muscles No

## 2022-01-25 NOTE — PRE SEDATION
Sedation Pre-Procedure Note    Patient Name: Justa Warren   YOB: 1945  Room/Bed: 5578/6960-33  Medical Record Number: [id number]  Date: 1/24/2022   Time: 11:08 PM       Indication: Acute respiratory failure with hypoxemia/pulmonary infiltrates/lung nodules/history of Pseudomonas and fungal infections/mucous plugging/ineffective airway clearance    Consent: I have discussed with the patient and/or the patient representative the indication, alternatives, and the possible risks and/or complications of the planned procedure and the anesthesia methods. The patient and/or patient representative appear to understand and agree to proceed. Vital Signs:   Vitals:    01/24/22 2200   BP:    Pulse:    Resp:    Temp:    SpO2: 100%       Past Medical History:   has a past medical history of Acute bronchitis, Adrenal adenoma, Atrial fibrillation ([address] Atrial fibrillation ([address] Benign neoplasm of colon, Calculus of kidney, Colonic polyps, Diverticulitis, Diverticulitis of colon (without mention of hemorrhage)(562.11), Fatty liver, Hyperlipidemia, Kidney stone, Other and unspecified hyperlipidemia, Other chronic nonalcoholic liver disease, Palpitations, Renal cyst, Steatosis, liver, and Tobacco use disorder. Past Surgical History:   has a past surgical history that includes Colonoscopy (7/08); doppler echocardiography (6/09); cardiovascular stress test (10/07, 10/05); liver biopsy (3/07); and Kidney stone surgery (6/05).     Medications:   Scheduled Meds:    meropenem  1,000 mg IntraVENous Q8H    melatonin  5 mg Oral Nightly    enoxaparin  40 mg SubCUTAneous Daily    dilTIAZem  120 mg Oral Daily    aspirin  81 mg Oral Daily    atorvastatin  40 mg Oral Daily    ferrous sulfate  325 mg Oral BID    FLUoxetine  10 mg Oral Daily    metoprolol tartrate  50 mg Oral BID    budesonide-formoterol  1 puff Inhalation BID    sodium chloride flush  5-40 mL IntraVENous 2 times per day     Continuous Infusions:  sodium chloride 25 mL (01/24/22 8033)     PRN Meds: albuterol, diphenhydrAMINE, sodium chloride flush, sodium chloride, ondansetron **OR** ondansetron, polyethylene glycol, acetaminophen **OR** acetaminophen, perflutren lipid microspheres  Home Meds:   Prior to Admission medications    Medication Sig Start Date End Date Taking? Authorizing Provider   aspirin 81 MG chewable tablet Take 1 tablet by mouth daily. 4/12/13  Yes Savanah Shabazz MD   ferrous sulfate (IRON 325) 325 (65 Fe) MG tablet Take 325 mg by mouth 2 times daily    Historical Provider, MD   FLUoxetine (PROZAC) 10 MG capsule Take 10 mg by mouth daily    Historical Provider, MD   rivaroxaban (XARELTO) 20 MG TABS tablet TAKE 1 TABLET BY MOUTH DAILY 5/10/16   Historical Provider, MD   ATROVENT HFA 17 MCG/ACT inhaler INHALE 1 PUFF INTO LUNGS THREE TIMES DAILY 3/13/15   Luis M Guidry MD   Oral Medication Containers (MEDICATION CONTAINER/SMALL) MISC Take 5 mLs by mouth 4 times daily as needed. Miles Mixture:  80 cc Viscous Lidocaine, 20 mg Hydrocortisone, 100 mg Doxycycline, 2 million units Nystatin, qs up to 180 cc with Benadryl elixir (cherry or grape) 2/19/15   Luis M Guidry MD   amiodarone (CORDARONE) 200 MG tablet TAKE 1 TABLET BY MOUTH EVERY DAY 2/13/15   Luis M Guidry MD   fluocinonide (LIDEX) 0.05 % ointment APPLY TO THE AFFECTED AREA TWICE DAILY 11/10/14   Luis M Guidry MD   fluticasone [facility]) 50 MCG/ACT nasal spray USE 2 SPRAYS IN [facility] NOSTRIL DAILY 10/27/14   Luis M Guidry MD   Pseudoephedrine-Ibuprofen  MG TABS Take 1 capsule by mouth 4 times daily as needed. 6/26/14   Luis M Guidry MD   atorvastatin (LIPITOR) 40 MG tablet TAKE 1 TABLET BY MOUTH ONCE DAILY 5/31/14   Luis M Guidry MD   Omega-3 Fatty Acids (OMEGA 3 PO) Take  by mouth. Historical Provider, MD   doxycycline (VIBRAMYCIN) 50 MG capsule Take 50 mg by mouth daily.     Historical Provider, MD   simvastatin (ZOCOR) 20 MG tablet TAKE 1 TABLET BY MOUTH EVERY NIGHT 4/18/14 Stefanie Briones MD   furosemide (LASIX) 40 MG tablet Take 1 tablet by mouth daily for 10 days. 4/12/13 9/14/20  Alysa Gan MD   docusate sodium 100 MG CAPS Take 100 mg by mouth 2 times daily as needed for Constipation. 4/12/13   Alysa Gan MD   potassium chloride (POTASSIUM CHLORIDE) 10 MEQ tablet Take 1 tablet by mouth 3 times daily (with meals) for 10 days. Patient taking differently: Take 10 mEq by mouth daily  4/12/13 9/14/20  Alysa Gan MD   metoprolol (LOPRESSOR) 50 MG tablet Take 1 tablet by mouth 2 times daily. Hold this medication if Systolic blood pressure (top number) is less than 90 or if heart rate is less than 60 and call Dr. Mariann Moreau office for further instructions. Ask for YOLIE HERNANDEZ [facility]. Call [phone]. 4/12/13   Alysa Gan MD   fluticasone-salmeterol (ADVAIR DISKUS) 250-50 MCG/DOSE AEPB Inhale 1 puff into the lungs every 12 hours. Historical Provider, MD           Pre-Sedation Documentation and Exam:   Vital signs have been reviewed (see flow sheet for vitals).     Mallampati Airway Assessment:  Mallampati Class II - (soft palate, fauces & uvula are visible)    Prior History of Anesthesia Complications:   none    ASA Classification:  Class 4 - A patient with an incapacitating systemic disease that is a constant threat to life    Sedation/ Anesthesia Plan:   intravenous sedation    Medications Planned:   midazolam (Versed) intravenously and fentanyl intravenously    Patient is an appropriate candidate for plan of sedation: yes    Electronically signed by Opal Montalvo MD on 1/24/2022 at 11:08 PM

## 2022-01-25 NOTE — PROGRESS NOTES
01/24/22 1827   NIV Type   $NIV $Daily Charge   Skin Assessment Clean, dry, & intact   Skin Protection for O2 Device Yes   Location Nose   NIV Started/Stopped On   Equipment Type v60   Mode Bilevel   Mask Type Full face mask   Mask Size Medium   Settings/Measurements   IPAP 12 cmH20   CPAP/EPAP 6 cmH2O   Rate Ordered 12   Resp (!) 32   FiO2  60 %   I Time/ I Time % 1 s   Vt Exhaled 609 mL   Minute Volume 20.6 Liters   Mask Leak (lpm) 18 lpm   Comfort Level Good   Using Accessory Muscles Yes   SpO2 95   Alarm Settings   Alarms On Y   Press Low Alarm 6 cmH2O   High Pressure Alarm 30 cmH2O   Apnea (secs) 20 secs   Resp Rate Low Alarm 6   High Respiratory Rate 45 br/min

## 2022-01-26 NOTE — PROGRESS NOTES
Called out to rt for prn treatment. Pt states increased sob started when be began to eat. Pt was on 7 liters hf for several hours not he is back up to 10 liters hf and nrb for recovery. Pt started to hyperventilate. nrb placed Waiting for rt.

## 2022-01-26 NOTE — CARE COORDINATION
Spoke with patient and niece at bedside for introduction as patient is new to this CM. Patient and niece confirmed that the plan is for patient to go to [facility] at discharge. They are asking about completing medical advanced directives. Referral made to [facility]).

## 2022-01-26 NOTE — FLOWSHEET NOTE
01/26/22 1529   Encounter Summary   Services provided to: Patient   Referral/Consult From: Nurse   Support System Family members   Continue Visiting   (1.26.22/COMPLETED AD & LW)   Complexity of Encounter Moderate   Length of Encounter 1 hour   Spiritual Assessment Completed Yes   Routine   Type Initial   Assessment Approachable   Intervention Active listening;Nurtured hope;Sustaining presence/ Ministry of presence   Outcome Expressed gratitude;Engaged in conversation

## 2022-01-26 NOTE — PROGRESS NOTES
INPATIENT PULMONARY CRITICAL CARE PROGRESS NOTE      Reason for visit    Bronchiectasis/hypoxemia    SUBJECTIVE: Patient underwent bronchoscopy and large amount of pus was removed from the tracheobronchial tree with improvement in the patency of the airways, patient had some hypoxemia and patient wanted to go on BiPAP overnight which was placed and patient when seen this morning was continued to be on BiPAP patient was requiring 50% oxygen on the BiPAP to maintain oxygen saturation which was 97%, patient was having less chest congestion, patient was still hypoxemic but patient's work of breathing had improved to some extent, patient was afebrile and hemodynamically maintained, patient was in atrial fibrillation with variable ventricular rate, patient has been having borderline urine output with cumulative fluid balance of -1.3 L, patient was kept n.p.o. overnight because being on BiPAP, patient was alert and communicative, no other pertinent review of system of concern    Physical Exam:  Blood pressure 120/75, pulse 119, temperature 97.6 °F (36.4 °C), temperature source Oral, resp. rate 30, height 5' 8\" (1.727 m), weight 180 lb (81.6 kg), SpO2 96 %.'     Constitutional:  No respiratory distress with no audible chest congestion  HENT:  Oropharynx is clear and moist. No thyromegaly. Eyes:  Conjunctivae are normal. Pupils equal, round, and reactive to light. No scleral icterus. pallor   Neck: . No tracheal deviation present. No obvious thyroid mass. Cardiovascular: s1s2 irregularly irregular , normal heart sounds. No right ventricular heave. No lower extremity edema. Pulmonary/Chest: No wheezes. Decreased B/L  Rales. decreased chest congestion   Chest wall is not dull to percussion. No accessory muscle usage or stridor. Decreased BSI with prolonged expiration   Abdominal: Soft. Bowel sounds present. No distension or hernia. No tenderness. Musculoskeletal: No cyanosis. No clubbing. No obvious joint deformity. Lymphadenopathy: No cervical or supraclavicular adenopathy. Skin: Skin is warm and dry. No rash or nodules on the exposed extremities. Psychiatric: Normal mood and affect. Behavior is normal.  No anxiety. Neurologic: Alert, awake and oriented. PERRL. Speech fluent       Results:  CBC:   Recent Labs     01/25/22  1022   WBC 11.7*   HGB 8.0*   HCT 25.5*   MCV 91.6        BMP:   Recent Labs     01/23/22  0940 01/25/22  1022    142   K 4.5 4.2    104   CO2 24 27   BUN 22* 28*   CREATININE 0.8 0.7*     PT/INR:   Recent Labs     01/23/22  0940   PROTIME 16.5*   INR 1.44*       Imaging:  I have reviewed radiology images personally. XR CHEST PORTABLE   Final Result   Chronic appearing reticulonodular opacities. Results for Shayy Duran (MRN [id number]) as of 1/25/2022 22:32   Ref. Range 1/22/2022 17:02 1/22/2022 18:21 1/23/2022 09:40   Sodium Latest Ref Range: 136 - 145 mmol/L 136  137   Potassium Latest Ref Range: 3.5 - 5.1 mmol/L 5.4 (H)  4.5   Chloride Latest Ref Range: 99 - 110 mmol/L 98 (L)  101   CO2 Latest Ref Range: 21 - 32 mmol/L 26  24   BUN Latest Ref Range: 7 - 20 mg/dL 24 (H)  22 (H)   Creatinine Latest Ref Range: 0.8 - 1.3 mg/dL 0.9  0.8   Anion Gap Latest Ref Range: 3 - 16  12  12   GFR Non- Latest Ref Range: >60  >60  >60   GFR  Latest Ref Range: >60  >60  >60   Lactic Acid Latest Ref Range: 0.4 - 2.0 mmol/L  1.2    Glucose Latest Ref Range: 70 - 99 mg/dL 98  92   CALCIUM, SERUM, 199128 Latest Ref Range: 8.3 - 10.6 mg/dL 9.8  9.3   Total Protein Latest Ref Range: 6.4 - 8.2 g/dL 8.3 (H)     Procalcitonin Latest Ref Range: 0.00 - 0.15 ng/mL 0.35 (H)         Results for Shayy Duran (MRN [id number]) as of 1/25/2022 22:32   Ref.  Range 3/5/2020 23:17 9/14/2020 01:18 1/22/2022 17:02 1/25/2022 10:22   WBC Latest Ref Range: 4.0 - 11.0 K/uL 7.7 8.0 16.5 (H) 11.7 (H)   RBC Latest Ref Range: 4.20 - 5.90 M/uL 3.67 (L) 3.52 (L) 3.38 (L) 2.78 (L)   Hemoglobin Quant Latest Ref Range: 13.5 - 17.5 g/dL 10.9 (L) 10.4 (L) 9.9 (L) 8.0 (L)   Hematocrit Latest Ref Range: 40.5 - 52.5 % 34.0 (L) 32.1 (L) 30.9 (L) 25.5 (L)   MCV Latest Ref Range: 80.0 - 100.0 fL 92.6 91.1 91.4 91.6   MCH Latest Ref Range: 26.0 - 34.0 pg 29.8 29.5 29.4 28.8   MCHC Latest Ref Range: 31.0 - 36.0 g/dL 32.2 32.4 32.1 31.5   MPV Latest Ref Range: 5.0 - 10.5 fL 7.0 7.6 6.7 6.4   RDW Latest Ref Range: 12.4 - 15.4 % 18.7 (H) 19.0 (H) 18.1 (H) 18.0 (H)   Platelet Count Latest Ref Range: 135 - 450 K/uL 264 323 473 (H) 363   Neutrophils % Latest Units: % 74.2 76.0 93.0    Lymphocyte % Latest Units: % 11.9 10.5 2.7    Monocytes % Latest Units: % 12.3 10.2 3.9      Component 1/24/22 1043 Resulting Agency   CULTURE, RESPIRATORY  Abnormal   Normal respiratory daniel absent   so far   P    [facility]   Gram Stain Result 3+ WBC's (Polymorphonuclear) present   1+ Gram positive cocci  in pairs- resembling Strep  [facility]   Organism Escherichia coli Abnormal  P  [facility]   CULTURE, RESPIRATORY 50,000 to 100,000 CFU/mL   Sensitivity to follow              Assessment:  Active Problems:    HTN (hypertension)    S/P CABG x 4    Atrial fibrillation (HCC)    Tachycardia    SOB (shortness of breath)    Chest congestion    Pulmonary infiltrates    Lung nodules    Mediastinal adenopathy    Grade II diastolic dysfunction    Pulmonary HTN (HCC)    H/O pneumonia due to Pseudomonas    Former smoker    Paecilomyces lilacinus infection    NICHOLAS (obstructive sleep apnea)    Leukocytosis    Pneumonia of both lower lobes due to Escherichia coli [facility])  Resolved Problems:    * No resolved hospital problems.  *          Plan:   · Oxygen supplementation to keep saturation between 90 and 94% only  · Please titrate oxygen as per the above parameters  · Patient can be taken off the BiPAP and put on heated high flow oxygen if required  · Pulmonary toilet  · Patient has been told about the clinical findings/auscultation and also previous evaluations including a CT of the chest done at the [facility] along with findings/implications and options  · Patient has had a Pseudomonas and ESBL E. coli pneumonia along with that patient also has had Paecilomyces infection on the basis of sputum culture in the past and the implications were discussed  · Status post diagnostic and therapeutic bronchoscopy and the preliminary data suggest that patient has E. coli pneumonia  · Continue meropenem -titration as per clinical status and final culture reports  · Patient can be given BiPAP on intermittent basis as he uses CPAP/BiPAP at home  · If patient has Paecilomyces infection -amphotericin B/posaconazole are options -we will hold off on that till final culture reports  · Patient is on Symbicort which can be continued  · Patient does not need any albuterol on a regular basis with Symbicort as it can cause patient to have more beta agonist effects including tachycardia-change to as needed basis  · Cardiac medications as per IM/cardiology   · May need speech evaluation   · PUD prophylaxis as per IM     Case discussed with patient and nursing     Further management depending on patient clinical status and the follow-up on above recommendations along with the bronchoscopy results        Electronically signed by:  Silas Bustillo MD    1/25/2022    10:33 PM.

## 2022-01-26 NOTE — PROGRESS NOTES
01/26/22 0400   NIV Type   NIV Started/Stopped On   Equipment Type v60   Mode Bilevel   Mask Type Full face mask   Mask Size Medium   Settings/Measurements   IPAP 12 cmH20   CPAP/EPAP 6 cmH2O   Rate Ordered 12   Resp 29   Insp Rise Time (%) 1 %   FiO2  50 %   I Time/ I Time % 1 s   Vt Exhaled 440 mL   Minute Volume 12.8 Liters   Mask Leak (lpm) 25 lpm   Comfort Level Good   Using Accessory Muscles No   SpO2 99   Alarm Settings   Alarms On Y   Press Low Alarm 6 cmH2O   High Pressure Alarm 30 cmH2O   Delay Alarm 20 sec(s)   Resp Rate Low Alarm 6   High Respiratory Rate 45 br/min

## 2022-01-26 NOTE — PROGRESS NOTES
Occupational Therapy  Facility/Department: [facility]  Daily Treatment Note  NAME: Ricco Larry  : 1945  MRN: [id number]    Date of Service: 2022    Discharge Recommendations:  Subacute/Skilled Nursing Facility     Assessment   Performance deficits / Impairments: Decreased functional mobility ; Decreased safe awareness;Decreased balance;Decreased ADL status; Decreased endurance;Decreased strength;Decreased high-level IADLs  Assessment: Pt is functioning below baseline for ADLs and mobility. Pt requires CGA for bed mobility and SBA sitting balance. He demo's SOB with activity, recovers with PLB and short rest breaks. Did not perform transfer OOB today d/t fatigue. Cont OT in acute care. Prognosis: Good  OT Education: OT Role;Plan of Care;ADL Adaptive Strategies; Energy Conservation; Family Education  Patient Education: Disease specifics: role of OT, energy conservation, ADLs--pt receptive to education  REQUIRES OT FOLLOW UP: Yes  Activity Tolerance  Activity Tolerance: Patient limited by fatigue;Treatment limited secondary to medical complications (free text)  Activity Tolerance: SOB with sitting EOB, O2 sats at rest 95% on 10L, O2 sats after sitting 91%  Safety Devices  Safety Devices in place: Yes  Type of devices: Left in bed;Call light within reach; Bed alarm in place;Nurse notified     Patient Diagnosis(es): The primary encounter diagnosis was Tachycardia. Diagnoses of Shortness of breath and Paroxysmal atrial fibrillation ([address] were also pertinent to this visit.       has a past medical history of Acute bronchitis, Adrenal adenoma, Atrial fibrillation ([address] Atrial fibrillation ([address] Benign neoplasm of colon, Calculus of kidney, Colonic polyps, Diverticulitis, Diverticulitis of colon (without mention of hemorrhage)(562.11), Fatty liver, Hyperlipidemia, Kidney stone, Other and unspecified hyperlipidemia, Other chronic nonalcoholic liver disease, Palpitations, Renal cyst, Steatosis, liver, and Tobacco use disorder. has a past surgical history that includes Colonoscopy (7/08); doppler echocardiography (6/09); cardiovascular stress test (10/07, 10/05); liver biopsy (3/07); Kidney stone surgery (6/05); bronchoscopy (N/A, 1/24/2022); and bronchoscopy (1/24/2022). Restrictions  Restrictions/Precautions  Restrictions/Precautions: Fall Risk,Up as Tolerated,General Precautions  Position Activity Restriction  Other position/activity restrictions: 10L high-flow O2, telemetry with continuous pulse ox  Subjective   General  Chart Reviewed: Faby Mukherjee and Physical  Patient assessed for rehabilitation services?: Yes  Family / Caregiver Present: No  Referring Practitioner: Florence Daley MD 1/24/22  Diagnosis: SOB, atrial fibrillation  Subjective  Subjective: Pt agreeable to OT  General Comment  Comments: RN approved therapy  Vital Signs  Patient Currently in Pain: Denies   Orientation  Orientation  Overall Orientation Status: Within Functional Limits  Objective    ADL  Feeding: Setup; Increased time to complete  Grooming: Stand by assistance; Increased time to complete;Verbal cueing (comb hair while EOB)  UE Dressing: Moderate assistance (change gown)  LE Dressing: Maximum assistance (socks)   Balance  Sitting Balance: Stand by assistance  Standing Balance: Unable to assess(comment)  Standing Balance  Activity: Pt sat EOB x5 min for UE activities. He declined standing or transfer d/t complaint of SOB.  Pt returned to supine, O2 sats 91% on 10L  Bed mobility  Supine to Sit: Contact guard assistance (HOB elevated)  Sit to Supine: Contact guard assistance         Cognition  Overall Cognitive Status: WFL         Type of ROM/Therapeutic Exercise  Type of ROM/Therapeutic Exercise: AROM  Comment: seated  Exercises  Shoulder Elevation: 10x  Shoulder Flexion: 10x  Elbow Flexion: 10x  Elbow Extension: 10x  Grasp/Release: 10x       Plan   Plan  Times per week: 3-5x's a week while in acute care AM-PAC Score   AM-PAC Inpatient Daily Activity Raw Score: 15 (01/26/22 1301)  AM-PAC Inpatient ADL T-Scale Score : 34.69 (01/26/22 1301)  ADL Inpatient CMS 0-100% Score: 56.46 (01/26/22 1301)  ADL Inpatient CMS G-Code Modifier : CK (01/26/22 1301)  Goals  Short term goals  Time Frame for Short term goals: 1 week unless otherwise specificed 1/31  Short term goal 1: Pt will complete LE dressing with Valentina-ongoing, max A 1/26  Short term goal 2: Pt will complete toilet transfer with Valentina-ongoing,ALYSHA 1/26  Short term goal 3: Pt will complete standing level ADLs with CGA for balance-ongoing, ALYSHA 1/26  Short term goal 4: Pt will tolerated 2 mins of standing level activity before requiring seated rest break.-ongoing, ALYSHA 1/26  Short term goal 5: Pt will complete 10 reps of BUE AROM exercises to increase strength for ADLs/transfers by 1/29-ongoing for consistency 1/26  Patient Goals   Patient goals : \"to get better \"     Therapy Time   Individual Concurrent Group Co-treatment   Time In 1043         Time Out 1121         Minutes 38         Timed Code Treatment Minutes: 45 Minutes    If pt is discharged prior to next OT session, this note will serve as the discharge summary.   Tl Cooper OT

## 2022-01-26 NOTE — ACP (ADVANCE CARE PLANNING)
Advance Care Planning     Advance Care Planning Inpatient Note  Spiritual Care Department    Today's Date: 1/26/2022  Unit: [facility] C4 PCU    Received request from patient. Upon review of chart and communication with care team, patient's decision making abilities are not in question. Adán Narayan Healthcare Decision Maker was/were present in the room during visit. Goals of ACP Conversation:  Discuss advance care planning documents  Facilitate a discussion related to patient's goals of care as they align with the patient's values and beliefs. Health Care Decision Makers:       Primary Decision Maker: Gillian Olson - [phone]    Summary:  Completed [address] Decision [facility]    Advance Care Planning Documents (Patient Wishes):  Healthcare Power of /Advance Directive Appointment of [address]  Living Will/Advance Directive  Anatomical Gift/Organ Donation     Assessment:    Patient Stevenson Stevens clearly understood the purpose of the AD & LW. His niece Karen Bryant will be his POA. She was present in the rooom. Interventions:  Provided education on documents for clarity and greater understanding  Discussed and provided education on state decision maker hierarchy  Assisted in the completion of documents according to patient's wishes at this time  Encouraged ongoing ACP conversation with future decision makers and loved ones    Care Preferences Communicated:   No    Outcomes/Plan:  ACP Discussion: Completed  New advance directive completed. Returned original document(s) to patient, as well as copies for distribution to appointed agents  Copy of advance directive given to staff to scan into medical record.   Teach Back Method used to verify the patient's and/or Healthcare Decision Maker's understanding of key information in the advance directive documents    Electronically signed by Asim Galvan, [address] on 1/26/2022 at 3:31 PM

## 2022-01-26 NOTE — PROGRESS NOTES
 enoxaparin  40 mg SubCUTAneous Daily    aspirin  81 mg Oral Daily    atorvastatin  40 mg Oral Daily    ferrous sulfate  325 mg Oral BID    FLUoxetine  10 mg Oral Daily    metoprolol tartrate  50 mg Oral BID    budesonide-formoterol  1 puff Inhalation BID    sodium chloride flush  5-40 mL IntraVENous 2 times per day     PRN Meds: albuterol, diphenhydrAMINE, sodium chloride flush, sodium chloride, ondansetron **OR** ondansetron, polyethylene glycol, acetaminophen **OR** acetaminophen, perflutren lipid microspheres      Intake/Output Summary (Last 24 hours) at 1/26/2022 0956  Last data filed at 1/26/2022 2799  Gross per 24 hour   Intake 120 ml   Output 500 ml   Net -380 ml       Physical Exam Performed:    /76   Pulse 119   Temp 96.2 °F (35.7 °C) (Axillary)   Resp 29   Ht 5' 8\" (1.727 m)   Wt 170 lb 10.2 oz (77.4 kg)   SpO2 100%   BMI 25.95 kg/m²     General appearance: No apparent distress, appears stated age and cooperative. HEENT: Pupils equal, round, and reactive to light. Conjunctivae/corneas clear. Neck: Supple, with full range of motion. No jugular venous distention. Trachea midline. Respiratory:  Normal respiratory effort. Clear anterior breath sounds   Cardiovascular: Regular rate and rhythm with normal S1/S2 without murmurs, rubs or gallops. Abdomen: Soft, non-tender, non-distended with normal bowel sounds. Musculoskeletal: No clubbing, cyanosis or edema bilaterally. Full range of motion without deformity. Skin: Skin color, texture, turgor normal.  No rashes or lesions. Neurologic:  Neurovascularly intact without any focal sensory/motor deficits.  Cranial nerves: II-XII intact, grossly non-focal.  Psychiatric: Alert and oriented, thought content appropriate, normal insight  Capillary Refill: Brisk,3 seconds, normal   Peripheral Pulses: +2 palpable, equal bilaterally       Labs:   Recent Labs     01/25/22  1022 01/26/22  0552   WBC 11.7* 11.3*   HGB 8.0* 8.2*   HCT 25.5* 27.1*  387     Recent Labs     01/25/22  1022 01/26/22  0552    140   K 4.2 4.5    104   CO2 27 26   BUN 28* 33*   CREATININE 0.7* 0.7*   CALCIUM 9.0 8.8     Urinalysis:      Lab Results   Component Value Date    NITRU Negative 06/23/2014    WBCUA 0-2 12/08/2013    BACTERIA 1+ 08/07/2010    RBCUA 0-2 12/08/2013    BLOODU Negative 06/23/2014    SPECGRAV 1.010 06/23/2014    GLUCOSEU Negative 06/23/2014    GLUCOSEU NEGATIVE 08/07/2010       Radiology:  XR CHEST PORTABLE   Final Result   Chronic appearing reticulonodular opacities. 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]      Priority: High    HTN (hypertension) [I10]      Priority: High    Atrial fibrillation (HCC) [I48.91]      Priority: Medium    Pneumonia of both lower lobes due to Escherichia coli (HCC) [J15.5]     SOB (shortness of breath) [R06.02]     Chest congestion [R09.89]     Pulmonary infiltrates [R91.8]     Lung nodules [R91.8]     Mediastinal adenopathy [R59.0]     Grade II diastolic dysfunction [Z45.84]     Pulmonary HTN ([address] [I27.20]     H/O pneumonia due to Pseudomonas [Z87.01]     Former smoker [Z87.891]     Paecilomyces lilacinus infection [B47.0]     NICHOLAS (obstructive sleep apnea) [G47.33]     Leukocytosis [D72.829]     Tachycardia [R00.0]        Afib with RVR  - cardiology consulted/following  - stopped amiodarone; currently on metoprolol and cardizem  - no AC due to history of GI bleed  - ECHO shows normal LVEF of 55%, RV volume overload  - monitor pt on telemetry     Acute on chronic hypoxic respiratory failure 2/2 bronchiectasis  - pulmonary consulted/following  - s/p bronch on 1/24. BAL grew E.  Coli MDRO; cytology negative for malignancy, fungal forms or PCP  - PCT elevated, continue IV merrem, consult ID  - continue home inhalers  - wean O2 as able, on 3L NC at baseline, currently requiring BiPAP / HFNC  - no need for steroids at present  - rapid COVID negative     HTN  - well controlled  - continue metoprolol, cardizem    HLD  - continue home statin    Anemia, chronic  - H&H relatively stable  - no overt signs of bleeding  - continue to monitor      DVT Prophylaxis: lovenox  Diet: ADULT DIET;  Regular  Code Status: Full Code    PT/OT Eval Status: ordered with recs for SNF    Dispo - Plan for SNF at [place], likely 3-4 days     [facility], APRN - CNP

## 2022-01-26 NOTE — PROGRESS NOTES
pts home pulmonologist called back and left message at  that he was not participating with decisions of pts care while here at [facility] being treated.

## 2022-01-26 NOTE — PROGRESS NOTES
[facility] ([place]     Electrophysiology                                     Progress Note    Admission date:  2022    Reason for follow up visit: AF    HPI/CC: Savannah Gabriel was admitted on 2022 with shortness of breath. EP following for persistent atrial fibrillation. Amiodarone was stopped due to ineffectiveness and concerns for possible pulmonary toxicity. Has also been treated for abnormal sputum cultures and respiratory failure. Rhythm has been AF 80-90's. Subjective: He complains of ongoing and worsening shortness of breath. Denies chest pain, palpitations, and dizziness. Vitals:  Blood pressure 114/76, pulse 119, temperature 96.2 °F (35.7 °C), temperature source Axillary, resp. rate 29, height 5' 8\" (1.727 m), weight 170 lb 10.2 oz (77.4 kg), SpO2 100 %.   Temp  Av.2 °F (36.2 °C)  Min: 96.2 °F (35.7 °C)  Max: 97.6 °F (36.4 °C)  Pulse  Av  Min: 88  Max: 119  BP  Min: 102/66  Max: 124/72  SpO2  Av.8 %  Min: 94 %  Max: 100 %  FiO2   Av %  Min: 50 %  Max: 50 %    24 hour I/O    Intake/Output Summary (Last 24 hours) at 2022 1009  Last data filed at 2022 4969  Gross per 24 hour   Intake 120 ml   Output 500 ml   Net -380 ml     Current Facility-Administered Medications   Medication Dose Route Frequency Provider Last Rate Last Admin    dilTIAZem (CARDIZEM CD) extended release capsule 120 mg  120 mg Oral BID Lucila Cook MD   120 mg at 22    meropenem (MERREM) 1,000 mg in sodium chloride 0.9 % 100 mL IVPB (mini-bag)  1,000 mg IntraVENous Q8H Carmen Mariscal  mL/hr at 22 182 1,000 mg at 22 182    albuterol (PROVENTIL) nebulizer solution 2.5 mg  2.5 mg Nebulization Q6H PRN Carmen Mariscal MD   2.5 mg at 22    melatonin disintegrating tablet 5 mg  5 mg Oral Nightly Gualberto Farmer MD   5 mg at 22    enoxaparin (LOVENOX) injection 40 mg  40 mg SubCUTAneous Daily Marquita King MD   40 mg at 01/25/22 0915    diphenhydrAMINE (BENADRYL) tablet 50 mg  50 mg Oral Nightly PRN TOAN Pérez - CNP   50 mg at 01/25/22 2104    aspirin chewable tablet 81 mg  81 mg Oral Daily TOAN Pérez - CNP   81 mg at 01/25/22 0916    atorvastatin (LIPITOR) tablet 40 mg  40 mg Oral Daily TOAN Pérez - CNP   40 mg at 01/25/22 7629    ferrous sulfate (IRON 325) tablet 325 mg  325 mg Oral BID TOAN Pérez - CNP   325 mg at 01/25/22 2104    FLUoxetine (PROZAC) capsule 10 mg  10 mg Oral Daily TOAN Pérez CNP   10 mg at 01/25/22 0915    metoprolol tartrate (LOPRESSOR) tablet 50 mg  50 mg Oral BID OTAN Pérez - CNP   50 mg at 01/25/22 2104    budesonide-formoterol (SYMBICORT) 160-4.5 MCG/ACT inhaler 1 puff  1 puff Inhalation BID TOAN Pérez CNP   1 puff at 01/26/22 0836    sodium chloride flush 0.9 % injection 5-40 mL  5-40 mL IntraVENous 2 times per day TOAN Pérez CNP   10 mL at 01/25/22 2107    sodium chloride flush 0.9 % injection 5-40 mL  5-40 mL IntraVENous PRN TOAN Pérez CNP   10 mL at 01/26/22 0152    0.9 % sodium chloride infusion  25 mL IntraVENous PRN TOAN Pérez  mL/hr at 01/25/22 0927 25 mL at 01/25/22 0927    ondansetron (ZOFRAN-ODT) disintegrating tablet 4 mg  4 mg Oral Q8H PRN TOAN Pérez CNP        Or    ondansetron (ZOFRAN) injection 4 mg  4 mg IntraVENous Q6H PRN TOAN Pérez - OKSANA        polyethylene glycol (GLYCOLAX) packet 17 g  17 g Oral Daily PRN TOAN Pérez CNP        acetaminophen (TYLENOL) tablet 650 mg  650 mg Oral Q6H PRN TOAN Pérez - CNP   650 mg at 01/25/22 2104    Or    acetaminophen (TYLENOL) suppository 650 mg  650 mg Rectal Q6H PRN TOAN Pérez CNP        perflutren lipid microspheres (DEFINITY) injection 1.65 mg  1.5 mL IntraVENous ONCE PRN TOAN Pérez CNP           Objective:     Telemetry monitor: AF    Physical Exam:  Constitutional and general 01/26/2022    HGB 8.2 01/26/2022    HCT 27.1 01/26/2022    MCV 92.3 01/26/2022    RDW 17.9 01/26/2022     01/26/2022     BNP:    Lab Results   Component Value Date    .1 04/04/2013     Fasting Lipid Panel:    Lab Results   Component Value Date    CHOL 127 06/23/2014    HDL 53 06/23/2014    HDL 45 05/02/2012    TRIG 47 06/23/2014     Cardiac Enzymes:  CK/MbTroponin  Lab Results   Component Value Date    TROPONINI <0.01 01/22/2022     PT/ INR   Lab Results   Component Value Date    INR 1.44 01/23/2022    INR 1.05 12/08/2013    INR 1.29 07/15/2013    PROTIME 16.5 01/23/2022    PROTIME 11.7 12/08/2013    PROTIME 14.5 07/15/2013     PTT No results found for: PTT   Lab Results   Component Value Date    MG 2.30 03/05/2020      Lab Results   Component Value Date    TSH 2.18 06/23/2014       Assessment:  Persistent atrial fibrillation: stable, overall rate controlled    -[id number] score 4 (age, HTN, CAD)   -previously on long term amiodarone, discontinued this admission due to ineffectiveness and concerns for pulmonary toxicity   CAD s/p remote CABG and MAZE  Shortness of breath: onogoing   COPD with pulmonary fibrosis   HTN: controlled   HLD  Nephrolithiasis   Pneumonia   Abnormal sputum cultures    -positive for candida, pseudomonas, Paecilomyces, E. Coli   Normocytic anemia   History of GIB/AVM       Plan:   1. Continue ASA, Lipitor, Cardizem and Lopressor   2. No AC due to recurrent GIB  3. Amiodarone stopped due to ineffectiveness and concerns for pulmonary toxicity   4.  Continue to monitor on telemetry      [facility], APRN-CNP  [address]  [phone]

## 2022-01-26 NOTE — PROGRESS NOTES
01/26/22 0032   NIV Type   $NIV $Daily Charge   Skin Protection for O2 Device Yes   Location Nose   NIV Started/Stopped On   Equipment Type v60   Mode Bilevel   Mask Type Full face mask   Mask Size Medium   Settings/Measurements   IPAP 12 cmH20   CPAP/EPAP 6 cmH2O   Rate Ordered 12   Resp (!) 35   FiO2  50 %   I Time/ I Time % 1 s   Vt Exhaled 530 mL   Minute Volume 18.5 Liters   Mask Leak (lpm) 49 lpm   Comfort Level Good   Using Accessory Muscles No   SpO2 96   Alarm Settings   Alarms On Y   Press Low Alarm 6 cmH2O   High Pressure Alarm 30 cmH2O   Delay Alarm 20 sec(s)   Resp Rate Low Alarm 6   High Respiratory Rate 45 br/min

## 2022-01-26 NOTE — PROGRESS NOTES
Pulmonary Progress Note    Date of Admission: 1/22/2022   LOS: 4 days       CC:  E coli pneumonia    Subjective:   feeling better but still shortness of breath      ROS:   No nausea  No Vomiting  No chest pain       Assessment:     A fib with RVR  HTN  HLD    Plan: This note may have been transcribed using [id number] [employer]. Please disregard any translational errors. Hospital Day: 4     E coli pneumonia   * hx pseudomonas and paecilomyces  * Merrem. Hx of merrem x 3 weeks. * Procal low and improved. * wBC improved     Acute hypoxemia   * Wean O2 to sat >90%  * decreased to 10 L Nc. Decreased to 8. * chronic 3-6 L NC        copd  * chronic o2  * chronic home bipap. Device is failing but description from pt   * bipap in room  Advised daughter to bring in home device  * home H[place]. Spiriva ordered  * continue Symbicort      discussed care with daughter. Data:        PHYSICAL EXAM:   Blood pressure 112/73, pulse 102, temperature 97.5 °F (36.4 °C), resp. rate 24, height 5' 8\" (1.727 m), weight 170 lb 10.2 oz (77.4 kg), SpO2 100 %.'  Body mass index is 25.95 kg/m². Gen: No distress. ENT:   Resp: No accessory muscle use. No crackles. No wheezes. No rhonchi. CV: Regular rate. Regular rhythm. No murmur or rub. No edema. Skin: Warm, dry, normal texture and turgor. No nodule on exposed extremities. M/S: No cyanosis. No clubbing. No joint deformity. Psych: Oriented x 3. No anxiety. Awake. Alert. Intact judgement and insight. Good Mood / Affect.   Memory appears in tact       Medications:    Scheduled Meds:   dilTIAZem  120 mg Oral BID    meropenem  1,000 mg IntraVENous Q8H    melatonin  5 mg Oral Nightly    enoxaparin  40 mg SubCUTAneous Daily    aspirin  81 mg Oral Daily    atorvastatin  40 mg Oral Daily    ferrous sulfate  325 mg Oral BID    FLUoxetine  10 mg Oral Daily    metoprolol tartrate  50 mg Oral BID    budesonide-formoterol  1 puff Inhalation BID    sodium chloride flush  5-40 mL IntraVENous 2 times per day       Continuous Infusions:   sodium chloride 25 mL (01/25/22 0927)       PRN Meds:  albuterol, diphenhydrAMINE, sodium chloride flush, sodium chloride, ondansetron **OR** ondansetron, polyethylene glycol, acetaminophen **OR** acetaminophen, perflutren lipid microspheres    Labs reviewed:  CBC:   Recent Labs     01/25/22  1022 01/26/22  0552   WBC 11.7* 11.3*   HGB 8.0* 8.2*   HCT 25.5* 27.1*   MCV 91.6 92.3    387     BMP:   Recent Labs     01/25/22  1022 01/26/22  0552    140   K 4.2 4.5    104   CO2 27 26   BUN 28* 33*   CREATININE 0.7* 0.7*     LIVER PROFILE: No results for input(s): AST, ALT, LIPASE, BILIDIR, BILITOT, ALKPHOS in the last 72 hours. Invalid input(s): AMYLASE,  ALB  PT/INR: No results for input(s): PROTIME, INR in the last 72 hours. APTT: No results for input(s): APTT in the last 72 hours. UA:No results for input(s): NITRITE, COLORU, PHUR, LABCAST, WBCUA, RBCUA, MUCUS, TRICHOMONAS, YEAST, BACTERIA, CLARITYU, SPECGRAV, LEUKOCYTESUR, UROBILINOGEN, BILIRUBINUR, BLOODU, GLUCOSEU, AMORPHOUS in the last 72 hours. Invalid input(s): Akua Bernard  No results for input(s): PH, PCO2, PO2 in the last 72 hours. Cx:      Films: This note was transcribed using [id number] [employer]. Please disregard any translational errors.       [facility] Gut[facility] [phone]

## 2022-01-26 NOTE — PLAN OF CARE
Call placed to patient's primary pulmonologist, Dr. Leopold Bruch to update on recent bronch cultures: E.Coli to discuss current antibiotic course and duration.     Left message with office, awaiting call back from [facility] ([phone])

## 2022-01-26 NOTE — PROGRESS NOTES
This note also relates to the following rows which could not be included:  SpO2 - Cannot attach notes to unvalidated device data       01/26/22 1750   Treatment   Treatment Type HHN   $Treatment Type $Inhaled Therapy/Meds   Medications Albuterol   Pre-Tx Pulse 86   Pre-Tx Resps 22   Breath Sounds Pre-Tx KIMMY Diminished   Breath Sounds Pre-Tx LLL Crackles   Breath Sounds Pre-Tx RUL Diminished   Breath Sounds Pre-Tx RML Crackles   Breath Sounds Pre-Tx RLL Crackles   Breath Sounds Post-Tx KIMMY Diminished   Breath Sounds Post-Tx LLL Crackles   Breath Sounds Post-Tx RUL Diminished   Breath Sounds Post-Tx RML Crackles   Breath Sounds Post-Tx RLL Crackles   Post-Tx Pulse 85   Post-Tx Resps 26   Delivery Source Oxygen   Position Semi-Guillaume's   Tx Tolerance Well   Oxygen Therapy/Pulse Ox   O2 Therapy Oxygen humidified   O2 Device High flow nasal cannula   O2 Flow Rate (L/min) 15 L/min   Resp 28   Cough/Sputum   Cough Congested;Strong;Non-productive     Patient found to be on NRB mask and hfnc. Has patient cough. It was strong but not productive congested  Patient Breath sounds with Crackles mid way up both sides of the chest.  Possible flash pulmonary edema.

## 2022-01-27 NOTE — PROGRESS NOTES
Physical Therapy  Facility/Department: [address] [place] [address] PCU  Daily Treatment Note  NAME: Claudette Hurst  : 1945  MRN: [id number]    Date of Service: 2022    Discharge Recommendations:  Subacute/Skilled Nursing Facility   PT Equipment Recommendations  Equipment Needed: No  Other: TBD at SNF    Assessment   Body structures, Functions, Activity limitations: Decreased functional mobility ; Decreased strength;Decreased endurance;Decreased balance;Decreased posture  Assessment: Pt requiring Valentina x 1 for sit<>stand transfers and able to transfer bed>chair while amb 4' min A rw. Recommend SNF at D/C in light of current deficits. Treatment Diagnosis: Decreased endurance and (I) with mobility  Prognosis: Good  Decision Making: Medium Complexity  PT Education: Goals; General Safety;PT Role;Plan of Care;Family Education;Disease Specific Education; Functional Mobility Training;Equipment;Precautions;Transfer Training;Energy Conservation  Patient Education: Disease-specific education: Pt educated on role of PT in hospital and pursed-lip breathing techniques during rest breaks; pt verbalizes understanding. REQUIRES PT FOLLOW UP: Yes  Activity Tolerance  Activity Tolerance: Patient Tolerated treatment well; Other  Activity Tolerance: 121/74  HR 84  O2 100% on 10 L O2. Pt appears self-limiting for activities. Patient Diagnosis(es): The primary encounter diagnosis was Tachycardia. Diagnoses of Shortness of breath and Paroxysmal atrial fibrillation ([address] were also pertinent to this visit.      has a past medical history of Acute bronchitis, Adrenal adenoma, Atrial fibrillation ([address] Atrial fibrillation ([address] Benign neoplasm of colon, Calculus of kidney, Colonic polyps, Diverticulitis, Diverticulitis of colon (without mention of hemorrhage)(562.11), Fatty liver, Hyperlipidemia, Kidney stone, Other and unspecified hyperlipidemia, Other chronic nonalcoholic liver disease, Palpitations, Renal cyst, Steatosis, liver, and Tobacco use disorder. has a past surgical history that includes Colonoscopy (7/08); doppler echocardiography (6/09); cardiovascular stress test (10/07, 10/05); liver biopsy (3/07); Kidney stone surgery (6/05); bronchoscopy (N/A, 1/24/2022); and bronchoscopy (1/24/2022). Restrictions  Restrictions/Precautions  Restrictions/Precautions: Fall Risk,Up as Tolerated,General Precautions  Position Activity Restriction  Other position/activity restrictions: 10L high-flow O2, telemetry with continuous pulse ox  Subjective   General  Chart Reviewed: Yes  Family / Caregiver Present: Yes (niece)  Referring Practitioner: Dr. Audra Starr  Subjective  Subjective: Pt agreeable to work with PT this afternoon  General Comment  Comments: Pt resting in bed upon entry of PT, per Sanya RN: pt refusing to stop using his rebreather although O2 SATs 100% on 10 L O2  Pain Screening  Patient Currently in Pain: Denies  Vital Signs  Patient Currently in Pain: Denies       Orientation  Orientation  Overall Orientation Status: Within Normal Limits  Cognition      Objective   Bed mobility  Supine to Sit: Contact guard assistance  Transfers  Sit to Stand: Minimal Assistance  Stand to sit: Minimal Assistance  Bed to Chair: Minimal assistance (rw)  Ambulation  Ambulation?: Yes  More Ambulation?: No (pt c/o fatigue and anxiety over breathing enough air.)  Ambulation 1  Device: Rolling Walker  Assistance: Minimal assistance  Gait Deviations: Slow Karen;Decreased step length;Decreased step height  Distance: 4'  Comments: assist to manage 02 tubes and IV lines     Balance  Posture: Fair  Sitting - Static: Good  Sitting - Dynamic: Good;-  Standing - Static: Fair;+  Standing - Dynamic: Fair (rw)  Exercises  Heelslides: 10 B  Hip Abduction: 10 B  Knee Long Arc Quad: 10 B  Ankle Pumps: 10 B  Comments: had pt practicee I spironometer 10x and PLB 2mins.          Comment: Pt stood in rw for 30\" sba         AM-PAC Score  AM-PAC Inpatient Mobility Raw Score : 15 (01/27/22 1410)  AM-PAC Inpatient T-Scale Score : 39.45 (01/27/22 1410)  Mobility Inpatient CMS 0-100% Score: 57.7 (01/27/22 1410)  Mobility Inpatient CMS G-Code Modifier : CK (01/27/22 1410)          Goals  Short term goals  Time Frame for Short term goals: 1 week, 1/31/22 (unless otherwise specified)  Short term goal 1: Pt will transfer supine <-> sit with supervision  -1/27 cga  Short term goal 2: Pt will transfer sit <-> stand and bed>chair using RW or least AD with supervision   -1/27 min A rw  Short term goal 3: Pt will ambulate x 60 feet using RW or least AD with SBA   -1/27 min A rw 4'  Short term goal 4: By 1/26/22: Pt will tolerate 12-15 reps BLE exercise for strengthening, balance, and endurance   -1/27 initiated  Patient Goals   Patient goals : \"To get back to my normal activities at home\"    Plan    Plan  Times per week: 3-5x/week in acute care  Times per day: Daily  Specific instructions for Next Treatment: Progress ther ex and mobility as tolerated  Current Treatment Recommendations: Strengthening,Balance Training,Endurance Training,Functional Mobility Training,Transfer Training,Gait Training,Home Exercise Program,Safety Education & Training,Patient/Caregiver Education & Training,Equipment Evaluation, Education, & procurement  Safety Devices  Type of devices:  All fall risk precautions in place,Nurse notified,Gait belt,Patient at risk for falls,Call light within reach,Left in chair,Chair alarm in place     Therapy Time   Individual Concurrent Group Co-treatment   Time In 1311         Time Out 1350         Minutes 39         Timed Code Treatment Minutes: [address] [facility] E

## 2022-01-27 NOTE — PROGRESS NOTES
[facility], [place]     Electrophysiology                                     Progress Note    Admission date:  2022    Reason for follow up visit: AF    HPI/CC: Edilma Ochoa was admitted on 2022 with shortness of breath. EP following for persistent atrial fibrillation. Amiodarone was stopped due to ineffectiveness and concerns for possible pulmonary toxicity. Has also been treated for abnormal sputum cultures and respiratory failure. His oxygen requirements have increased. Rhythm has been AF 80-90's. Subjective: He complains of ongoing shortness of breath. Denies chest pain, palpitations, and dizziness. Vitals:  Blood pressure 121/74, pulse 84, temperature 98.1 °F (36.7 °C), temperature source Axillary, resp. rate 22, height 5' 8\" (1.727 m), weight 170 lb 6.7 oz (77.3 kg), SpO2 100 %.   Temp  Av.8 °F (36.6 °C)  Min: 97.5 °F (36.4 °C)  Max: 98.1 °F (36.7 °C)  Pulse  Av.8  Min: 74  Max: 92  BP  Min: 108/66  Max: 121/74  SpO2  Av.7 %  Min: 92 %  Max: 100 %    24 hour I/O    Intake/Output Summary (Last 24 hours) at 2022 1231  Last data filed at 2022 0507  Gross per 24 hour   Intake 390 ml   Output 800 ml   Net -410 ml     Current Facility-Administered Medications   Medication Dose Route Frequency Provider Last Rate Last Admin    methylPREDNISolone sodium (SOLU-MEDROL) injection 40 mg  40 mg IntraVENous Q12H [facility], APRN - [facility]   40 mg at 22 1219    dilTIAZem (CARDIZEM CD) extended release capsule 120 mg  120 mg Oral BID Leonard Christensen MD   120 mg at 22 0837    meropenem (MERREM) 1,000 mg in sodium chloride 0.9 % 100 mL IVPB (mini-bag)  1,000 mg IntraVENous Q8H Jules Keys MD   Stopped at 22 0928    albuterol (PROVENTIL) nebulizer solution 2.5 mg  2.5 mg Nebulization Q6H PRN Jules Keys MD   2.5 mg at 22 1747    melatonin disintegrating tablet 5 mg  5 mg Oral Nightly Merlin Michele MD   5 mg at 22  enoxaparin (LOVENOX) injection 40 mg  40 mg SubCUTAneous Daily Tuyet Jesus MD   40 mg at 01/27/22 0837    diphenhydrAMINE (BENADRYL) tablet 50 mg  50 mg Oral Nightly PRN TOAN Oden CNP   50 mg at 01/25/22 2104    aspirin chewable tablet 81 mg  81 mg Oral Daily TOAN Oden CNP   81 mg at 01/27/22 7320    atorvastatin (LIPITOR) tablet 40 mg  40 mg Oral Daily TOAN Oden CNP   40 mg at 01/27/22 1788    ferrous sulfate (IRON 325) tablet 325 mg  325 mg Oral BID TOAN Oden CNP   325 mg at 01/27/22 4926    FLUoxetine (PROZAC) capsule 10 mg  10 mg Oral Daily TOAN Oden CNP   10 mg at 01/27/22 0904    metoprolol tartrate (LOPRESSOR) tablet 50 mg  50 mg Oral BID TOAN Oden CNP   50 mg at 01/27/22 0837    budesonide-formoterol (SYMBICORT) 160-4.5 MCG/ACT inhaler 1 puff  1 puff Inhalation BID TOAN Oden CNP   1 puff at 01/27/22 0902    sodium chloride flush 0.9 % injection 5-40 mL  5-40 mL IntraVENous 2 times per day TOAN Oden CNP   10 mL at 01/27/22 1020    sodium chloride flush 0.9 % injection 5-40 mL  5-40 mL IntraVENous PRN TOAN Oden CNP   10 mL at 01/26/22 0152    0.9 % sodium chloride infusion  25 mL IntraVENous PRN TOAN Oden  mL/hr at 01/25/22 0927 25 mL at 01/25/22 0927    ondansetron (ZOFRAN-ODT) disintegrating tablet 4 mg  4 mg Oral Q8H PRN TOAN Oden CNP        Or    ondansetron (ZOFRAN) injection 4 mg  4 mg IntraVENous Q6H PRN TOAN Oden CNP        polyethylene glycol (GLYCOLAX) packet 17 g  17 g Oral Daily PRN TOAN Oden CNP        acetaminophen (TYLENOL) tablet 650 mg  650 mg Oral Q6H PRN TOAN Oden CNP   650 mg at 01/27/22 5247    Or    acetaminophen (TYLENOL) suppository 650 mg  650 mg Rectal Q6H PRN TOAN Oden CNP        perflutren lipid microspheres (DEFINITY) injection 1.65 mg  1.5 mL IntraVENous ONCE PRN Heladio Enriquez APRN - CNP           Objective:     Telemetry monitor: AF    Physical Exam:  Constitutional and general appearance: alert, cooperative, mild distress and appears stated age  HEENT: PERRL, no cervical lymphadenopathy. No masses palpable. Normal oral mucosa  Respiratory:  · Normal excursion and expansion without use of accessory muscles  · Resp auscultation: bilateral rhonchi  Cardiovascular:  · The apical impulse is not displaced  · Heart tones are crisp and normal. irregular S1 and S2.  · Jugular venous pulsation Normal  · The carotid upstroke is normal in amplitude and contour without delay or bruit  · Peripheral pulses are symmetrical and full   Abdomen:  · No masses or tenderness  · Bowel sounds present  Extremities:  ·  No cyanosis or clubbing  ·  No lower extremity edema  ·  Skin: warm and dry  Neurological:  · Alert and oriented  · Moves all extremities well  · No abnormalities of mood, affect, memory, mentation, or behavior are noted    Data  Echo 1/24/2022:  Summary   Limited visualization due to lung interference. Left ventricular systolic function is difficult to estimate but appears to   be grossly normal with an estimated ejection fraction of 55%. The left ventricle is normal in size with normal wall thickness. Flattened in diastole (\"D-shaped septum\") consistent with right ventricular   volume overload. Elevated left atrial pressures with a septal E/e' ratio of 20.9. The right ventricle is not well visualized but appears dilated in size. Right ventricular systolic function is reduced. Moderate biatrial enlargement. Mild mitral and tricuspid regurgitation. Systolic pulmonary artery pressure (SPAP) is normal and estimated at 28 mmHg   (right atrial pressure 3 mmHg). All labs and testing reviewed.   Lab Review     Renal Profile:   Lab Results   Component Value Date    CREATININE 0.7 01/27/2022    BUN 31 01/27/2022     01/27/2022    K 4.4 01/27/2022     01/27/2022    CO2 29 01/27/2022     CBC:    Lab Results   Component Value Date    WBC 13.3 01/27/2022    RBC 2.80 01/27/2022    HGB 7.9 01/27/2022    HCT 25.2 01/27/2022    MCV 90.0 01/27/2022    RDW 17.4 01/27/2022     01/27/2022     BNP:    Lab Results   Component Value Date    .1 04/04/2013     Fasting Lipid Panel:    Lab Results   Component Value Date    CHOL 127 06/23/2014    HDL 53 06/23/2014    HDL 45 05/02/2012    TRIG 47 06/23/2014     Cardiac Enzymes:  CK/MbTroponin  Lab Results   Component Value Date    TROPONINI <0.01 01/22/2022     PT/ INR   Lab Results   Component Value Date    INR 1.44 01/23/2022    INR 1.05 12/08/2013    INR 1.29 07/15/2013    PROTIME 16.5 01/23/2022    PROTIME 11.7 12/08/2013    PROTIME 14.5 07/15/2013     PTT No results found for: PTT   Lab Results   Component Value Date    MG 2.30 03/05/2020      Lab Results   Component Value Date    TSH 2.18 06/23/2014       Assessment:  Persistent atrial fibrillation: stable, overall rate controlled    -[id number] score 4 (age, HTN, CAD)   -previously on long term amiodarone, discontinued this admission due to ineffectiveness and concerns for pulmonary toxicity   CAD s/p remote CABG and MAZE  Shortness of breath: onogoing   COPD with pulmonary fibrosis   HTN: controlled   HLD  Nephrolithiasis   Pneumonia   Abnormal sputum cultures    -positive for candida, pseudomonas, Paecilomyces, E. Coli   Normocytic anemia   History of GIB/AVM       Plan:   1. Continue ASA, Lipitor, Cardizem and Lopressor   2. No AC due to recurrent GIB  3. Amiodarone stopped due to ineffectiveness and concerns for pulmonary toxicity   4. Continue to monitor on telemetry  5. Rates have been well controlled. EP will sign off but remains available if needed.     Denver Benedict, APRN-CNP  [facility]  [phone]

## 2022-01-27 NOTE — PROGRESS NOTES
Physician Progress Note      PATIENT:               Stephen Prado  CSN #:                  [id number]  :                       1945  ADMIT DATE:       2022 4:37 PM  DISCH DATE:  RESPONDING  PROVIDER #:        Kristine Betts CNP          QUERY TEXT:    Pt admitted with a/c respiratory failure 2/2 bronchiectasis. Noted   documentation of E Coli pneumonia per pulm consultant. If possible, please   document in progress notes and discharge summary:    The medical record reflects the following:  Risk Factors: bronchiectasis with prior pseudomonas and ESBL E Coli pneumonia  Clinical Indicators: bronch findings:  patient's entire tracheobronchial tree   was full of pus  micro/cx: normal respiratory daniel absent; E Coli ESBL 50-100k cfu/ml    pulm note : Status post diagnostic and therapeutic bronchoscopy and the   preliminary data suggest that patient has E. coli pneumonia    Treatment: IV meropenem, imaging, labs, pulm consult w/ bronch, micro/cx,   supportive care    Thank you,  [email or web address]  Options provided:  -- E Coli pneumonia confirmed present on admission  -- E Coli pneumonia  ruled out  -- Other - I will add my own diagnosis  -- Disagree - Not applicable / Not valid  -- Disagree - Clinically unable to determine / Unknown  -- Refer to Clinical Documentation Reviewer    PROVIDER RESPONSE TEXT:    The diagnosis of E Coli pneumonia was confirmed as present on admission. Query created by: Susan Rubio on 2022 2:41 PM      QUERY TEXT:    Pt admitted with acute respiratory failure, now noted to have E Coli pna per   pulm. If possible, please document in the progress notes and discharge summary   if you are evaluating and /or treating any of the following:     The medical record reflects the following:  Risk Factors: bronchiectasis, e coli pna per pulm  Clinical Indicators: on arrival wbc 16.5, HR 100s- 110s in afib, tachypnea w/   acute resp fail, pct 0.35  Treatment: IV 1 ltr bolus + MIV, IV abx without blood cultures, imaging, labs,   supportive care and monitoring    Thank you,  Janith Cockayne RN CDS [email or web address]  Options provided:  -- Sepsis, present on arrival  -- Localized infection without Sepsis  -- Other - I will add my own diagnosis  -- Disagree - Not applicable / Not valid  -- Disagree - Clinically unable to determine / Unknown  -- Refer to Clinical Documentation Reviewer    PROVIDER RESPONSE TEXT:    This patient has sepsis which was present on arrival.    Query created by:  Napoleon Babinski on 1/26/2022 2:49 PM      Electronically signed by:  Blanca Betts CNP 1/26/2022 7:20 PM

## 2022-01-27 NOTE — PROGRESS NOTES
Pt is refusing BIPAP, pt states that hospital unit id \"too dry\". Pt states that he is going to have his home unit brought in. RT recommended that pt wear bipap, Rt explained BIPAP benefits, pt still refused.

## 2022-01-27 NOTE — PROGRESS NOTES
Hospitalist Progress Note      PCP: Francisco Crandall MD    Date of Admission: 1/22/2022    Chief Complaint: tachycardia, SOB    Hospital Course:   67 y.o. male, with PMH of atrial fibrillation, HTN, CAD and HLD, who presented to [address] [place] [address] with tachycardia and shortness of breath. Pt stated he has been experiencing generalized weakness since November. He stated he has also been experiencing an increase in shortness of breath for the last 1 to 2 months. The pt does have a history of bronchiectasis and follows with pulmonology. He stated he usually wears 3 L nasal cannula of supplemental oxygen at home at baseline, but has had to increase it recently to 6 L nasal cannula. The pt stated he also has a history of tachycardia/atrial fibrillation. He stated he follows with Dr. Marquis Johnson from cardiology and Dr. Sara Pritchett from EP. The pt stated when he has episodes of tachycardia and rest for the last 10 to 13 seconds and resolves on its own. However, the patient stated over the last week or so his tachycardia has not been resolving. He stated today his heart rate went as high as 150 so he went to the ED for further evaluation. In the ED a twelve-lead EKG was done that did confirm he was in atrial fibrillation with RVR. He was given 1 L of normal saline. He denied any other associated symptoms as well as any aggravating and/or alleviating factors. At the time of this assessment, the patient was resting comfortably in bed. He currently denies any chest pain, back pain, abdominal pain, shortness of breath, numbness, tingling, N/V/C/D, fever and/or chills. Subjective:   Pt is on 15 liters HFNC + 100% NRB. +dyspnea. +cough with improved expectoration today. No chest pain. No N/V/D. Pt very anxious.      Medications:  Reviewed    Infusion Medications    sodium chloride 25 mL (01/25/22 9223)     Scheduled Medications    dilTIAZem  120 mg Oral BID    meropenem  1,000 mg IntraVENous Q8H    melatonin  5 mg Oral Nightly    enoxaparin  40 mg SubCUTAneous Daily    aspirin  81 mg Oral Daily    atorvastatin  40 mg Oral Daily    ferrous sulfate  325 mg Oral BID    FLUoxetine  10 mg Oral Daily    metoprolol tartrate  50 mg Oral BID    budesonide-formoterol  1 puff Inhalation BID    sodium chloride flush  5-40 mL IntraVENous 2 times per day     PRN Meds: albuterol, diphenhydrAMINE, sodium chloride flush, sodium chloride, ondansetron **OR** ondansetron, polyethylene glycol, acetaminophen **OR** acetaminophen, perflutren lipid microspheres      Intake/Output Summary (Last 24 hours) at 1/27/2022 0941  Last data filed at 1/27/2022 0507  Gross per 24 hour   Intake 400 ml   Output 800 ml   Net -400 ml       Physical Exam Performed:    /78   Pulse 74   Temp 97.6 °F (36.4 °C) (Axillary)   Resp 22   Ht 5' 8\" (1.727 m)   Wt 170 lb 6.7 oz (77.3 kg)   SpO2 100%   BMI 25.91 kg/m²     General appearance: No apparent distress, appears stated age and cooperative. HEENT: Pupils equal, round, and reactive to light. Conjunctivae/corneas clear. Neck: Supple, with full range of motion. No jugular venous distention. Trachea midline. Respiratory:  Increased respiratory effort. Crackles/rhonchi. Cardiovascular: Regular rate and rhythm with normal S1/S2 without murmurs, rubs or gallops. Abdomen: Soft, non-tender, non-distended with normal bowel sounds. Musculoskeletal: No clubbing, cyanosis or edema bilaterally. Full range of motion without deformity. Skin: Skin color, texture, turgor normal.  No rashes or lesions. Neurologic:  Neurovascularly intact without any focal sensory/motor deficits.  Cranial nerves: II-XII intact, grossly non-focal.  Psychiatric: Alert and oriented, thought content appropriate, normal insight  Capillary Refill: Brisk,3 seconds, normal   Peripheral Pulses: +2 palpable, equal bilaterally       Labs:   Recent Labs     01/25/22  1022 01/26/22  0552 01/27/22  0526   WBC 11.7* 11.3* 13.3*   HGB 8.0* 8.2* 7.9*   HCT solumedrol for now  - continue inhaled bronchodilator therapy   - wean O2 as able, on 3L NC at baseline, currently requiring BiPAP / HFNC  - rapid COVID negative     HTN  - well controlled  - continue metoprolol, cardizem    HLD  - continue home statin    Anemia, chronic  - H&H relatively stable  - no overt signs of bleeding  - continue to monitor      DVT Prophylaxis: lovenox  Diet: ADULT DIET;  Regular  Code Status: Full Code    PT/OT Eval Status: ordered with recs for SNF    Dispo - uncertain     TOAN Retana - CNP

## 2022-01-27 NOTE — CONSULTS
Attending Supervising Physicians Attestation Statement  I was present with the resident physician during the history and exam. I discussed the findings and plans with the resident physician and agree as documented in her note      75yoM with multiple medical problems   Chronic lung disease with ILD and bronchiectasis   Chronic hypoxemic respiratory fialure on baseline 3L NC  Has followed with ID and Pulm at [facility]. Last ID visit was 11/2021 at which time he was prescribed 3 week course IV meropenem for exacerbation of lung disease with resp culture positive for ESBL E Coli and Psa. Fungal culture at same time was positive for Paecilomyces was regarded as a colonizer. He recalls that during that time, with addition of Spiriva and the IV abx, he felt \"great. \"  Condition deteriorated after abx were completed. He has had decline in energy, worsening SOB since then. Increased his xygen to 6L and ultimately decided he needed to come in. Presented to ED 1/21/22 with SOB, hypoxemic respiratory failure  CXR with \"chronic appearing reticulonodular opacities. \"   Admitted for management and started on empiric levofloxacin. Had therapeutic bronch on 1/24/22, purulent secretions removed. Resp culture is positive for ESBL E coli. He was changed to meropenem. He is feeling somewhat better. Continued high oxygen requirement. Without fever.      -continue meropenem   -anticipate ongoing IV abx after, though if cultures are negative for Pseudomonas, we could consider po Bactrim as an alternative.   He tolerated the abx course well before.    -wean oxygen as tolerated    -taper steroid     D/w patient questions addressed  Will follow with you       Electronically signed by Javed Liz MD on 1/27/22 at 4:32 PM EST            Infectious Diseases   Consult Note      Reason for Consult: E. coli pneumonia, MDRO   Requesting Physician: Delores Favre, APRN - CNP  Date of Admission: 1/22/2022  Subjective:   CHIEF COMPLAINT: 58-year-old male w/ PMH: COPD with pulmonary fibrosis, HTN, HLD, bronchiectasis, former smoker, a-fib presents to the emergency department for evaluation of shortness of breath. HPI:     Patient reports having history of atrial fibrillation and is on amiodarone at home. Reports he had to be taken off of anticoagulation due to a recent GI bleed. Since then, he has been having uncontrolled tachycardia with increasing oxygen requirement over the past several months. The patient does have a history of bronchiectasis and follows with pulmonology. He stated he usually wears 3 L nasal cannula of supplemental oxygen at home at baseline, but has had to increase it recently to 6 L nasal cannula. Reports he has not been able to do activities of daily living at home due to the shortness of breath. Focused exam revealed generally ill appearing male on home 6L nc O2 maintaning o2 sat >90. Tachycardic to 117. Normotensive. Increase in shortness of breath for the last 1 to 2 months. CXR 1/22 Chronic appearing reticulonodular opacities. Given Levoquin 500 mg on 1/23/22. Throughout the hospital stay pt evaluated for and treated:    - Persistent atrial fibrillation: stable, overall rate controlled               -[id number] score 4 (age, HTN, CAD)              -previously on long term amiodarone, discontinued this admission due to ineffectiveness and concerns for pulmonary toxicity               -continue metoprolol, cardizem              - ECHO shows normal LVEF of 55%, RV volume overload    Acute on chronic hypoxic respiratory failure 2/2 bronchiectasis  Pneumonia   Shortness of breath: onogoing   - pulm consulted, s/p bronch on 1/24  - + PCT elevation, BAL grew E. Coli ESBL / MDRO; cytology negative for malignancy, fungal forms or PCP  Abnormal sputum cultures               -positive for candida, pseudomonas, Paecilomyces, E.  Coli     - Started on levaquin 1/23, switched to Paralee Pavel on 1/24  - continue inhaled bronchodilator therapy   - wean O2 as able, on 3L NC at baseline, currently requiring BiPAP / HFNC on 1/25  - Repeat CXR 1/27 Bilateral upper lobe opacification superimposed on chronic interstitial changes concerning for pneumonia. - started IV solumedrol 1/27  - rapid COVID negative     Past ID hx:   He was last seen by ID in 2019, at which time he was treated for bacterial pneumonia. He had a BAL in 2018 that grew out M. chelonae / abscessus group on AFB culture, but had several subsequent AFB respiratory cultures that were negative. 11/3/2021 He had a CT chest that showed progressive nodular disease and bronchiectasis. Routine, fungal, and AFB sputum cultures were sent. Routine culture grew out ESBL+ E coli and Pseudomonas aeruginosa. He was started on meropenem via PICC 2 days ago, followed by ID at [facility]. AFB culture is negative to date. He was also started on Spiriva. Meropenem x3 weeks. Labs / line care as per pulmonary clinic. Follow up AFB culture. If growth, will plan to initiate treatment. Consider diagnostic bronchoscopy if sputum AFB is negative. Follow up in ID clinic 4 weeks / prn.     Micro: Reviewed  10/21 sputum culture - E. coli (ESBL+), Pseudomonas aeruginosa (pan-S)  10/21 fungal sputum - C. tropicalis, Paecilomyces lilacinus  10/21 AFB sputum - NGTD    10/2019 BAL - Diptheroid bacilli, Serratia marcescens, C. tropicalis; AFB no growth  8/2019 BAL - normal respiratory daniel; fungal and AFB cultures no growth  7/2019 AFB sputum - no growth  2/2019 AFB sputum - no growth  8/2018 AFB sputum - no growth  5/2018 AFB sputum - no growth  5/2018 BAL - 50-100k cfu P. aeruginosa, normal respiratory daniel; growth of Scopulariopsis species on fungal cutlure; growth of M. chelonae/abscessus group on AFB culture  5/2018 IGRA - negative  5/2018 Aspergillus Ab panel - negative  5/2018 Coccidioides Ab - negative  5/2018 Histo Ag and Ab negative  5/2018 Blasto Ab negative    Pt complaints of worsening breathing. Received Ativan for worsening SOB. Pt worried that he did not have any follow up on ESBL + Ecoli infection from November and wonders if that he was not adequately treated at that time for the infection. He is worse during this hospital stay than he was previously.  He wears 3 L O2 at home, but has never need HFNC or BIPAP.   - NO fevers overnight; elevated resp, Pulse and BP ok  - Still on HFNC  - Talking ok and holding a conversation    Current abx:  Merrem 1000 mg q8h      Past Surgical History:       Diagnosis Date    Acute bronchitis     Adrenal adenoma 6/05    noted on CT, 2.2 x 1cm    Atrial fibrillation ([address]     Atrial fibrillation ([address] 5/12/2010    Benign neoplasm of colon 5/17/2010    Calculus of kidney     Colonic polyps     benign    Diverticulitis     Diverticulitis of colon (without mention of hemorrhage)(562.11) 5/17/2010    Fatty liver     Hyperlipidemia     Kidney stone 6/05    Other and unspecified hyperlipidemia 5/17/2010    Other chronic nonalcoholic liver disease 0/98/9321    Palpitations 8/04    echo showed diastolic dysfunction, holter monitor ( + )  for brief runs of SVT    Renal cyst 11/07    bilateral (7cm on L and 3 cm on R), no adrenal mass    Steatosis, liver 5/22/2010    Tobacco use disorder 5/17/2010         Procedure Laterality Date    BRONCHOSCOPY N/A 1/24/2022    BRONCHOSCOPY ALVEOLAR LAVAGE performed by Niyah Valle MD at [facility]  1/24/2022    BRONCHOSCOPY THERAPUTIC ASPIRATION INITIAL performed by Niyah Valle MD at [address] [facility] TEST  10/07, 10/05    stress echo-normal, negative    COLONOSCOPY  7/08    polyps and diverticuli, tubular adenoma    DOPPLER ECHOCARDIOGRAPHY  6/09    moderate in crease LA, otherwise ( - )   [address] SURGERY  6/05    LIVER BIOPSY  3/07    fatty liver - no autoimmune hepatits       Social History:    TOBACCO:   reports that he quit smoking about 8 years ago. His smoking use included cigarettes. He has a 52.00 pack-year smoking history. He has never used smokeless tobacco.  ETOH:   reports no history of alcohol use. There is no history of illicit drug use or other significant epidemiologic exposures. Family History:       Problem Relation Age of Onset    Macular Degen Sister     Thyroid Disease Mother     Cancer Neg Hx     Diabetes Neg Hx     Heart Disease Neg Hx     Kidney Disease Neg Hx      There is no family history of autoimmune diseases or significant infectious diseases.     Current Medications:    Current Facility-Administered Medications: methylPREDNISolone sodium (SOLU-MEDROL) injection 40 mg, 40 mg, IntraVENous, Q12H  hydrOXYzine (ATARAX) tablet 10 mg, 10 mg, Oral, TID PRN  dilTIAZem (CARDIZEM CD) extended release capsule 120 mg, 120 mg, Oral, BID  meropenem (MERREM) 1,000 mg in sodium chloride 0.9 % 100 mL IVPB (mini-bag), 1,000 mg, IntraVENous, Q8H  albuterol (PROVENTIL) nebulizer solution 2.5 mg, 2.5 mg, Nebulization, Q6H PRN  melatonin disintegrating tablet 5 mg, 5 mg, Oral, Nightly  enoxaparin (LOVENOX) injection 40 mg, 40 mg, SubCUTAneous, Daily  diphenhydrAMINE (BENADRYL) tablet 50 mg, 50 mg, Oral, Nightly PRN  aspirin chewable tablet 81 mg, 81 mg, Oral, Daily  atorvastatin (LIPITOR) tablet 40 mg, 40 mg, Oral, Daily  ferrous sulfate (IRON 325) tablet 325 mg, 325 mg, Oral, BID  FLUoxetine (PROZAC) capsule 10 mg, 10 mg, Oral, Daily  metoprolol tartrate (LOPRESSOR) tablet 50 mg, 50 mg, Oral, BID  budesonide-formoterol (SYMBICORT) 160-4.5 MCG/ACT inhaler 1 puff, 1 puff, Inhalation, BID  sodium chloride flush 0.9 % injection 5-40 mL, 5-40 mL, IntraVENous, 2 times per day  sodium chloride flush 0.9 % injection 5-40 mL, 5-40 mL, IntraVENous, PRN  0.9 % sodium chloride infusion, 25 mL, IntraVENous, PRN  ondansetron (ZOFRAN-ODT) disintegrating tablet 4 mg, 4 mg, Oral, Q8H PRN **OR** ondansetron (ZOFRAN) injection 4 mg, 4 mg, IntraVENous, Q6H PRN  polyethylene glycol (GLYCOLAX) packet 17 g, 17 g, Oral, Daily PRN  acetaminophen (TYLENOL) tablet 650 mg, 650 mg, Oral, Q6H PRN **OR** acetaminophen (TYLENOL) suppository 650 mg, 650 mg, Rectal, Q6H PRN  perflutren lipid microspheres (DEFINITY) injection 1.65 mg, 1.5 mL, IntraVENous, ONCE PRN    No Known Allergies     REVIEW OF SYSTEMS:    CONSTITUTIONAL:  + poor appetite, negative for fevers, chills, diaphoresis, activity change,  fatigue, night sweats and unexpected weight change. EYES:  negative for blurred vision, eye discharge, visual disturbance and icterus  HEENT:  negative for hearing loss, tinnitus, ear drainage, sinus pressure, nasal congestion, epistaxis and snoring  RESPIRATORY:  + cough, shortness of breath, No hemoptysis  CARDIOVASCULAR:  negative for chest pain, palpitations, exertional chest pressure/discomfort, edema, syncope  GASTROINTESTINAL:  negative for nausea, vomiting, diarrhea, constipation, blood in stool and abdominal pain  GENITOURINARY:  negative for frequency, dysuria, urinary incontinence, decreased urine volume, and hematuria  HEMATOLOGIC/LYMPHATIC:  negative for easy bruising, bleeding and lymphadenopathy  ALLERGIC/IMMUNOLOGIC: + recurrent infections,  negative for angioedema, anaphylaxis and drug reactions  ENDOCRINE:  negative for weight changes and diabetic symptoms including polyuria, polydipsia and polyphagia  MUSCULOSKELETAL:  negative for  pain, joint swelling, decreased range of motion and muscle weakness  NEUROLOGICAL:  negative for headaches, slurred speech, unilateral weakness  PSYCHIATRIC/BEHAVIORAL: negative for hallucinations, behavioral problems, confusion and agitation.        Objective:   PHYSICAL EXAM:      VITALS:  /74   Pulse 84   Temp 98.1 °F (36.7 °C) (Axillary)   Resp 22   Ht 5' 8\" (1.727 m)   Wt 170 lb 6.7 oz (77.3 kg)   SpO2 100%   BMI 25.91 kg/m²      24HR INTAKE/OUTPUT:      Intake/Output Summary (Last 24 hours) at 1/27/2022 1359  Last data filed at 1/27/2022 1308  Gross per 24 hour   Intake 390 ml   Output 950 ml   Net -560 ml     CONSTITUTIONAL:  Awake, alert, cooperative  HEENT: NCAT, PERRL, EOMI. Sclera white, conjunctive full. OP with moist mucosal membranes, no thrush, tongue protrudes midline  NECK:  Supple, symmetrical, trachea midline, no adenopathy  LUNGS:  no increased work of breathing and clear to auscultation. No accessory muscle use  CARDIOVASCULAR: S1 and S2, no murmur  ABDOMEN:  normal bowel sounds, non-tender, non-distended, no hepatosplenomegaly  LYMPHADENOPATHY:  no axillary or supraclavicular adenopathy. No cervical adnenopathy  PSYCHIATRIC: Oriented to person place and time. No obvious depression or anxiety. MUSCULOSKELETAL: No obvious misalignment or effusion of the joints. No clubbing, cyanosis of the digits. SKIN:  normal skin color, texture, turgor and no redness, warmth, or swelling. No palpable nodules or stigmata of embolic phenomenon  NEUROLOGIC: nonfocal exam  ACCESS: Peripheral IV Right Hand    DATA:    Old records have been reviewed    CBC:  Recent Labs     01/25/22  1022 01/26/22  0552 01/27/22  0526   WBC 11.7* 11.3* 13.3*   RBC 2.78* 2.93* 2.80*   HGB 8.0* 8.2* 7.9*   HCT 25.5* 27.1* 25.2*    387 383   MCV 91.6 92.3 90.0   MCH 28.8 28.1 28.4   MCHC 31.5 30.4* 31.6   RDW 18.0* 17.9* 17.4*      BMP:  Recent Labs     01/25/22  1022 01/26/22  0552 01/27/22  0526    140 137   K 4.2 4.5 4.4    104 101   CO2 27 26 29   BUN 28* 33* 31*   CREATININE 0.7* 0.7* 0.7*   CALCIUM 9.0 8.8 9.1   GLUCOSE 102* 104* 108*       Ref.  Range 1/22/2022 17:02 1/25/2022 06:54 1/26/2022 05:52   Procalcitonin Latest Ref Range: 0.00 - 0.15 ng/mL 0.35 (H) 0.28 (H) 0.18 (H)         Cultures:     Procedure Component Value Units Date/Time   Respiratory Virus PCR Panel [[id number]] Collected: 01/24/22 1043   Order Status: Completed Updated: 01/27/22 0802    Rsv Source BRONCH LAVAGE    INFLUENZA A Not Detected    INFLUENZA B Patient Active Problem List   Diagnosis    Atrial fibrillation (HCC)    Acute bronchitis    Smoking    MCALLISTER    Diverticulitis of colon    Calculus of kidney    Benign neoplasm of colon    HYPERLIPIDEMIA    Steatosis, liver    Adrenal mass (HCC)    HTN (hypertension)    Diplopia    Sleep apnea    Coronary atherosclerosis of native coronary artery    S/P CABG x 4    Acute blood loss anemia    Tachycardia    SOB (shortness of breath)    Chest congestion    Pulmonary infiltrates    Lung nodules    Mediastinal adenopathy    Grade II diastolic dysfunction    Pulmonary HTN ([address]    H/O pneumonia due to Pseudomonas    Former smoker    Paecilomyces lilacinus infection    NICHOLAS (obstructive sleep apnea)    Leukocytosis    Pneumonia of both lower lobes due to Escherichia coli [facility])     68-year-old male w/ PMH: COPD with pulmonary fibrosis, HTN, HLD, Normocytic anemia , History of GIB/AVM, Nephrolithiasis  presents to the emergency department for evaluation of shortness of breath. Acute on chronic hypoxic respiratory failure 2/2 bronchiectasis  Pneumonia   BAL grew E. Coli ESBL / MDRO  - E.coli showed up multiple times and pt is likely colonized  - Exacerbation of chronic ILD/COPD likely 2/2 to organism   Shortness of breath: onogoing   - pulm following, s/p bronch on 1/24  - + PCT elevation ; cytology negative for malignancy, fungal forms or PCP  - Started on levaquin 1/23, switched to Ralf Stephen on 1/24  - continue inhaled bronchodilator therapy   - wean O2 as able, on 3L NC at baseline, currently requiring BiPAP / HFNC on 1/25  - started IV solumedrol 1/27  - rapid COVID negative       Harshad Chaudhry DO  Family Medicine PGY-2    Examined and discussed with:    Noelle Lawler M.D. Thank you for the opportunity to participate in the care of your patient.     Please do not hesitate to contact me:   [phone] office  [phone] mobile

## 2022-01-27 NOTE — PROGRESS NOTES
Occupational Therapy   Treatment Attempt Note    Attempted OT treatment this date, however pt on non-rebreather at time of attempt. Will re-attempt later or next date as schedule permits and pt medically stable.      Meghan Vasquez, OTR/L

## 2022-01-28 PROBLEM — E43 SEVERE MALNUTRITION (HCC): Chronic | Status: ACTIVE | Noted: 2022-01-01

## 2022-01-28 NOTE — PROGRESS NOTES
Oral Nightly    enoxaparin  40 mg SubCUTAneous Daily    aspirin  81 mg Oral Daily    atorvastatin  40 mg Oral Daily    ferrous sulfate  325 mg Oral BID    FLUoxetine  10 mg Oral Daily    metoprolol tartrate  50 mg Oral BID    budesonide-formoterol  1 puff Inhalation BID    sodium chloride flush  5-40 mL IntraVENous 2 times per day     PRN Meds: hydrOXYzine, sodium chloride, albuterol, diphenhydrAMINE, sodium chloride flush, sodium chloride, ondansetron **OR** ondansetron, polyethylene glycol, acetaminophen **OR** acetaminophen, perflutren lipid microspheres      Intake/Output Summary (Last 24 hours) at 1/28/2022 1028  Last data filed at 1/28/2022 0920  Gross per 24 hour   Intake 820 ml   Output 725 ml   Net 95 ml       Physical Exam Performed:    /77   Pulse 92   Temp 98 °F (36.7 °C) (Oral)   Resp 22   Ht 5' 8\" (1.727 m)   Wt 170 lb 6.7 oz (77.3 kg)   SpO2 99%   BMI 25.91 kg/m²     General appearance: No apparent distress, appears stated age and cooperative. HEENT: Pupils equal, round, and reactive to light. Conjunctivae/corneas clear. Neck: Supple, with full range of motion. No jugular venous distention. Trachea midline. Respiratory:  Increased respiratory effort. Crackles/rhonchi. Cardiovascular: Regular rate and rhythm with normal S1/S2 without murmurs, rubs or gallops. Abdomen: Soft, non-tender, non-distended with normal bowel sounds. Musculoskeletal: No clubbing, cyanosis or edema bilaterally. Full range of motion without deformity. Skin: Skin color, texture, turgor normal.  No rashes or lesions. Neurologic:  Neurovascularly intact without any focal sensory/motor deficits.  Cranial nerves: II-XII intact, grossly non-focal.  Psychiatric: Alert and oriented, thought content appropriate, normal insight  Capillary Refill: Brisk,3 seconds, normal   Peripheral Pulses: +2 palpable, equal bilaterally       Labs:   Recent Labs     01/26/22  0552 01/27/22  0526 01/28/22  0529   WBC 11.3* 13.3* 7.6   HGB 8.2* 7.9* 7.9*   HCT 27.1* 25.2* 24.9*    383 376     Recent Labs     01/26/22  0552 01/27/22  0526 01/28/22  0529    137 138   K 4.5 4.4 5.0    101 102   CO2 26 29 31   BUN 33* 31* 27*   CREATININE 0.7* 0.7* 0.6*   CALCIUM 8.8 9.1 9.1     Urinalysis:      Lab Results   Component Value Date    NITRU Negative 06/23/2014    WBCUA 0-2 12/08/2013    BACTERIA 1+ 08/07/2010    RBCUA 0-2 12/08/2013    BLOODU Negative 06/23/2014    SPECGRAV 1.010 06/23/2014    GLUCOSEU Negative 06/23/2014    GLUCOSEU NEGATIVE 08/07/2010       Radiology:  XR CHEST PORTABLE   Final Result   Bilateral upper lobe opacification superimposed on chronic interstitial   changes concerning for pneumonia. XR CHEST PORTABLE   Final Result   Chronic appearing reticulonodular opacities.              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]      Priority: High    HTN (hypertension) [I10]      Priority: High    Atrial fibrillation (HCC) [I48.91]      Priority: Medium    Pneumonia of both lower lobes due to Escherichia coli (HCC) [J15.5]     SOB (shortness of breath) [R06.02]     Chest congestion [R09.89]     Pulmonary infiltrates [R91.8]     Lung nodules [R91.8]     Mediastinal adenopathy [R59.0]     Grade II diastolic dysfunction [D31.47]     Pulmonary HTN ([address] [I27.20]     H/O pneumonia due to Pseudomonas [Z87.01]     Former smoker [Z87.891]     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 Coli ESBL / MDRO; cytology negative for malignancy, fungal forms or PCP  - continue IV merrem, ID consulted / following, plan for IV merrem through till 2/4  - continue IV solumedrol for now -- defer to Pulm if needs to be continued   - continue inhaled bronchodilator therapy   - wean O2 as able, on 3L NC at baseline, currently requiring BiPAP / HFNC  - rapid COVID negative   - pulmonary toilet with IS and acapella, added mucinex     HTN  - well controlled  - continue metoprolol, cardizem    HLD  - continue home statin    Anemia, chronic  - H&H relatively stable  - no overt signs of bleeding  - continue to monitor      DVT Prophylaxis: lovenox  Diet: ADULT DIET;  Regular  Code Status: Full Code    PT/OT Eval Status: ordered with recs for SNF    Dispo - uncertain, likely several more days     Olga Mayorga, APRN - CNP

## 2022-01-28 NOTE — PROGRESS NOTES
Midline insertion Procedure Note  Ladd Ormond   Admitted- 1/22/2022  4:37 PM  Admission diagnosis- Atrial fibrillation ([facility] [address] [I48.91]  Shortness of breath [R06.02]  Paroxysmal atrial fibrillation ([address] [I48.0]  Tachycardia [R00.0]      Attending Physician- Sushma Spain MD  Ordering Physician- Sushma Spain MD  Indication for Insertion: Home Antibiotics    Lab Results   Component Value Date    INR 1.44 (H) 01/23/2022    PROTIME 16.5 (H) 01/23/2022     Lab Results   Component Value Date    WBC 7.6 01/28/2022    HGB 7.9 (L) 01/28/2022    HCT 24.9 (L) 01/28/2022     01/28/2022     Lab Results   Component Value Date    CREATININE 0.6 (L) 01/28/2022    BUN 27 (H) 01/28/2022    GFR >60 05/13/2013       Catheter Insertion Date- 1/28/2022   Catheter Brand- [employer] Blue Advance Midline  Lot Number- [id number]  Lumen-Single  Expiration date- 02/28/2023      Insertion Site- Right Basilic  Vein Diameter- 8.89 cm  [place]atian Size- 4.5  Catheter Length- 15 cm  Exposed Catheter Length- 0  cm   Easy insertion- Yes  Able to Aspirate Blood- Yes  Easy Flush- Yes    Midline insertion successful- Yes  Ultrasound- yes    Okay To Use Midline- blood return  Report called to- Belkis Hodges RN    Electronically signed by Dustin Morris, RN, RN on 1/28/2022 at 3:02 PM

## 2022-01-28 NOTE — CARE COORDINATION
Chart reviewed day #6. Per chart review and RN, patient is on 10 liters high flow oxygen at this time. His baseline is 3 liters. Therapy continues to recommend SNF and patient plans to discharge to [facility] at discharge with no precert needed. CM will continue to follow.

## 2022-01-28 NOTE — PLAN OF CARE
Problem: Nutrition  Goal: Optimal nutrition therapy  Outcome: Ongoing  Note: Nutrition Problem #1: Severe malnutrition  Intervention: Food and/or Nutrient Delivery: Continue Current Diet,Start Oral Nutrition Supplement  Nutritional Goals: Pt will consume greater than 50% of meals and ONS w/o further weight loss

## 2022-01-28 NOTE — PROGRESS NOTES
Occupational Therapy  Facility/Department: [facility] C4 PCU  Daily Treatment Note  NAME: Vanetta Brittle  : 1945  MRN: [id number]    Date of Service: 2022    Discharge Recommendations:  Subacute/Skilled Nursing Facility       Assessment   Performance deficits / Impairments: Decreased functional mobility ; Decreased safe awareness;Decreased balance;Decreased ADL status; Decreased endurance;Decreased strength;Decreased high-level IADLs  Assessment: Pt is functioning below baseline for ADLs and mobility. Pt progressing to SBA for bed mobility and SBA sitting balance. He demo's SOB and recovers with PLB and short rest breaks. Pt performing t/f with min A and SW this date. Cont OT in acute care. Prognosis: Good    OT Education: OT Role;Plan of Care;ADL Adaptive Strategies; Energy Conservation; Family Education  Patient Education: Disease specifics: role of OT, energy conservation, PLB, importance of OOB activity, general safety during hospitalization. --pt receptive to education  REQUIRES OT FOLLOW UP: Yes  Activity Tolerance  Activity Tolerance: Patient Tolerated treatment well;Treatment limited secondary to medical complications (free text)  Activity Tolerance: /6, O2 94%, HR 73. HR increasing to >120 with activity requiring increased recovery time, O2 decreasing to 85% on 9L of O2 via HFNC and increasing back to >90% with rest and PLB. Session limited by diagnostics arrival.  Safety Devices  Safety Devices in place: Yes  Type of devices: Left in bed;Call light within reach; Bed alarm in place;Nurse notified;Gait belt (diagnostics in room with bed raised)         Patient Diagnosis(es): The primary encounter diagnosis was Tachycardia. Diagnoses of Shortness of breath and Paroxysmal atrial fibrillation ([address] were also pertinent to this visit.       has a past medical history of Acute bronchitis, Adrenal adenoma, Atrial fibrillation ([address] Atrial fibrillation ([address] Benign neoplasm of colon, Calculus of kidney, Colonic polyps, Diverticulitis, Diverticulitis of colon (without mention of hemorrhage)(562.11), Fatty liver, Hyperlipidemia, Kidney stone, Other and unspecified hyperlipidemia, Other chronic nonalcoholic liver disease, Palpitations, Renal cyst, Steatosis, liver, and Tobacco use disorder. has a past surgical history that includes Colonoscopy (7/08); doppler echocardiography (6/09); cardiovascular stress test (10/07, 10/05); liver biopsy (3/07); Kidney stone surgery (6/05); bronchoscopy (N/A, 1/24/2022); bronchoscopy (1/24/2022); and IR MIDLINE CATH (1/28/2022). Restrictions  Restrictions/Precautions  Restrictions/Precautions: Fall Risk,Up as Tolerated,General Precautions  Position Activity Restriction  Other position/activity restrictions: 9L high-flow O2, telemetry with continuous pulse ox  Subjective   General  Chart Reviewed: Yes,Orders,Progress Notes,History and Physical  Patient assessed for rehabilitation services?: Yes  Response to previous treatment: Patient with no complaints from previous session  Family / Caregiver Present: No  Referring Practitioner: Florence Daley MD 1/24/22  Diagnosis: SOB, atrial fibrillation  Subjective  Subjective: Pt agreeable to OT  General Comment  Comments: RN approved therapy  Pain Assessment  Pain Level: 9  Pain Type: Acute pain  Pain Location: Shoulder  Pain Orientation: Left;Upper  Pain Descriptors: Aching  Non-Pharmaceutical Pain Intervention(s): Repositioned; Ambulation/Increased Activity; Distraction  Response to Pain Intervention: Patient Satisfied  Pre Treatment Pain Screening  Intervention List: Patient able to continue with treatment  Vital Signs  Patient Currently in Pain: Yes   Orientation  Orientation  Overall Orientation Status: Within Functional Limits  Objective             Balance  Sitting Balance: Stand by assistance  Standing Balance: Contact guard assistance  Standing Balance  Time: 30 secs  Activity: standing at EOB for julius pad change.   Comment: CGA disposition.

## 2022-01-28 NOTE — PROGRESS NOTES
Comprehensive Nutrition Assessment    Type and Reason for Visit:  RD Nutrition Re-Screen/LOS    Nutrition Recommendations/Plan:   1. Continue regular diet  2. Add magic cups BID, prefers vanilla  3. Encourage po intakes  4. Monitor nutrition adequacy, pertinent labs, bowel habits, wt changes, and clinical progress    Nutrition Assessment:  LOS assessment. Pt admitted with tachycardia, SOB, bronchiestasis. Currently on regular diet with variable po intakes 1-100% per EMR. Pt reports decreased appetite over the past 1-2 years d/t multiple health issues, pt did not elaborate further. Pt states he has only been eating ~2 meals /day. Pt states he used to weigh 260# in 2019 and weight has been declining since. CBW is 170#. Pt with visible muscle and fat loss. Encouraged po intakes and recommended adding ONS. Pt dislikes ensure, favorable to trying magic cups w/ meals, prefers vanilla. Will continue to monitor. Malnutrition Assessment:  Malnutrition Status:  Severe malnutrition    Context:  Chronic Illness     Findings of the 6 clinical characteristics of malnutrition:  Energy Intake:  7 - 75% or less estimated energy requirements for 1 month or longer  Body Fat Loss:  7 - Severe body fat loss Triceps   Muscle Mass Loss:  7 - Severe muscle mass loss Clavicles (pectoralis & deltoids)    Estimated Daily Nutrient Needs:  Energy (kcal):  7559-7050 kcals/day; Weight Used for Energy Requirements:  Current (77kg)     Protein (g):  77-92 g/day; Weight Used for Protein Requirements:  Current (1-1.2)      Fluid (ml/day):  1 ml/kcal      Nutrition Related Findings:  +1 RLE edema. Last BM 1/21. Wounds:  None       Current Nutrition Therapies:    ADULT DIET; Regular    Anthropometric Measures:  · Height: 5' 8\" (172.7 cm)  · Current Body Weight: 170 lb (77.1 kg)   · Ideal Body Weight: 154 lbs; % Ideal Body Weight 110.4 %   · BMI: 25.9  · BMI Categories: Overweight (BMI 25.0-29. 9)       Nutrition Diagnosis:   · Severe malnutrition related to inadequate protein-energy intake as evidenced by poor intake prior to admission,intake 0-25%,intake 26-50%,severe loss of subcutaneous fat,severe muscle loss    Nutrition Interventions:   Food and/or Nutrient Delivery:  Continue Current Diet,Start Oral Nutrition Supplement  Nutrition Education/Counseling:  No recommendation at this time   Coordination of Nutrition Care:  Continue to monitor while inpatient    Goals:  Pt will consume greater than 50% of meals and ONS w/o further weight loss       Nutrition Monitoring and Evaluation:   Behavioral-Environmental Outcomes:  None Identified   Food/Nutrient Intake Outcomes:  Food and Nutrient Intake,Supplement Intake  Physical Signs/Symptoms Outcomes:  Weight,Nutrition Focused Physical Findings,Biochemical Data     Discharge Planning:    Continue current diet,Continue Oral Nutrition Supplement     Electronically signed by Chey Thorne RD on 1/28/22 at 11:51 AM EST    Contact: [phone]

## 2022-01-28 NOTE — PROGRESS NOTES
Pulmonary Progress Note    Date of Admission: 1/22/2022   LOS: 6 days       CC:  E coli pneumonia    Subjective:  Better  Speaking in longer sentences     ROS:           Assessment:     A fib with RVR  HTN  HLD    Plan: This note may have been transcribed using [id number] [employer] [employer]. Please disregard any translational errors. Hospital Day: 6     E coli pneumonia   * hx pseudomonas and paecilomyces  * Merrem. Hx of merrem x 3 weeks. * ID consulted and now controlling antibiotics     Acute hypoxemia   * Wean O2 to sat >90%  * 9 L oxygen. Pt request higher flow. * chronic 3-6 L NC        copd  * chronic o2  * chronic home bipap. Device is failing but description from pt   * home Breo. Spiriva ordered  * continue Symbicort          Data:        PHYSICAL EXAM:   Blood pressure 113/67, pulse 73, temperature 97.4 °F (36.3 °C), temperature source Oral, resp. rate 24, height 5' 8\" (1.727 m), weight 170 lb 6.7 oz (77.3 kg), SpO2 94 %.'  Body mass index is 25.91 kg/m². Gen: No distress. ENT:   Resp: No accessory muscle use. No crackles. No wheezes. No rhonchi. CV: Regular rate. Regular rhythm. No murmur or rub. No edema. Skin: Warm, dry, normal texture and turgor. No nodule on exposed extremities. M/S: No cyanosis. No clubbing. No joint deformity. Psych: Oriented x 3. No anxiety. Awake. Alert. Intact judgement and insight. Good Mood / Affect.   Memory appears in tact       Medications:    Scheduled Meds:   guaiFENesin  600 mg Oral BID    methylPREDNISolone  40 mg IntraVENous Q12H    dilTIAZem  120 mg Oral BID    meropenem  1,000 mg IntraVENous Q8H    melatonin  5 mg Oral Nightly    enoxaparin  40 mg SubCUTAneous Daily    aspirin  81 mg Oral Daily    atorvastatin  40 mg Oral Daily    ferrous sulfate  325 mg Oral BID    FLUoxetine  10 mg Oral Daily    metoprolol tartrate  50 mg Oral BID    budesonide-formoterol  1 puff Inhalation BID    sodium chloride flush  5-40 mL IntraVENous 2 times per day       Continuous Infusions:   sodium chloride 25 mL (01/25/22 0927)       PRN Meds:  hydrOXYzine, sodium chloride, albuterol, diphenhydrAMINE, sodium chloride flush, sodium chloride, ondansetron **OR** ondansetron, polyethylene glycol, acetaminophen **OR** acetaminophen, perflutren lipid microspheres    Labs reviewed:  CBC:   Recent Labs     01/26/22 0552 01/27/22 0526 01/28/22 0529   WBC 11.3* 13.3* 7.6   HGB 8.2* 7.9* 7.9*   HCT 27.1* 25.2* 24.9*   MCV 92.3 90.0 90.2    383 376     BMP:   Recent Labs     01/26/22 0552 01/27/22 0526 01/28/22 0529    137 138   K 4.5 4.4 5.0    101 102   CO2 26 29 31   BUN 33* 31* 27*   CREATININE 0.7* 0.7* 0.6*     LIVER PROFILE: No results for input(s): AST, ALT, LIPASE, BILIDIR, BILITOT, ALKPHOS in the last 72 hours. Invalid input(s): AMYLASE,  ALB  PT/INR: No results for input(s): PROTIME, INR in the last 72 hours. APTT: No results for input(s): APTT in the last 72 hours. UA:No results for input(s): NITRITE, COLORU, PHUR, LABCAST, WBCUA, RBCUA, MUCUS, TRICHOMONAS, YEAST, BACTERIA, CLARITYU, SPECGRAV, LEUKOCYTESUR, UROBILINOGEN, BILIRUBINUR, BLOODU, GLUCOSEU, AMORPHOUS in the last 72 hours. Invalid input(s): Roxzeina Ashdayday  No results for input(s): PH, PCO2, PO2 in the last 72 hours. Cx:      Films: This note was transcribed using [id number] [address] [employer]. Please disregard any translational errors.       Mae Gutierres [facility], [facility] [phone]

## 2022-01-28 NOTE — PROGRESS NOTES
Infectious Disease Follow up Notes    CC :  Pneumonia, bronchiectasis, MDRO      Antibiotics:   Meropenem 1g q8    Solumedrol 40 q12    Admit Date:   1/22/2022  Hospital Day: 7    Subjective:   He remains AF  Currnetly on 9L HFNC as well as NRB. On HFNC only, SpO2 100%  Feels better. Primary concern is L shoulder pain which he indicates is a chronic problem for him though it comes and goes.     D/w RN     Objective:     Patient Vitals for the past 8 hrs:   BP Temp Temp src Pulse Resp SpO2   01/28/22 0855      99 %   01/28/22 0851     22 100 %   01/28/22 0811 118/77 98 °F (36.7 °C) Oral 92 22 97 %   01/28/22 0543 132/71 97.8 °F (36.6 °C) Axillary 80 20        EXAM:  General:   Alert, cooperative, NAD   HEENT:   NCAT, PERRL, sclera anicteric  NECK:   Supple, symmetrical   LUNGS:   Non-labored breathing  Upper lobes clear  CV:   RRR   ABD: Soft, flat, NT     EXT:  No focal rash        LINE:   PIV in place         Scheduled Meds:   methylPREDNISolone  40 mg IntraVENous Q12H    dilTIAZem  120 mg Oral BID    meropenem  1,000 mg IntraVENous Q8H    melatonin  5 mg Oral Nightly    enoxaparin  40 mg SubCUTAneous Daily    aspirin  81 mg Oral Daily    atorvastatin  40 mg Oral Daily    ferrous sulfate  325 mg Oral BID    FLUoxetine  10 mg Oral Daily    metoprolol tartrate  50 mg Oral BID    budesonide-formoterol  1 puff Inhalation BID    sodium chloride flush  5-40 mL IntraVENous 2 times per day       Continuous Infusions:   sodium chloride 25 mL (01/25/22 0927)          Data Review:    Lab Results   Component Value Date    WBC 7.6 01/28/2022    HGB 7.9 (L) 01/28/2022    HCT 24.9 (L) 01/28/2022    MCV 90.2 01/28/2022     01/28/2022     Lab Results   Component Value Date    CREATININE 0.6 (L) 01/28/2022    BUN 27 (H) 01/28/2022     01/28/2022    K 5.0 01/28/2022     01/28/2022    CO2 31 01/28/2022 Hepatic Function Panel:   Lab Results   Component Value Date    ALKPHOS 148 01/22/2022    ALT 21 01/22/2022    AST 34 01/22/2022    PROT 8.3 01/22/2022    PROT 6.7 01/08/2013    BILITOT 0.5 01/22/2022    BILIDIR 0.1 06/23/2014    IBILI 0.2 06/23/2014    LABALBU 3.0 01/22/2022         MICRO:  1/24 COVID NAAT neg   Resp culture ESBL E Coli; GS GPC   Escherichia coli (esbl)       BACTERIAL SUSCEPTIBILITY PANEL BY JAKE     ampicillin >=32 mcg/mL Resistant     ampicillin-sulbactam 4 mcg/mL Sensitive     ceFAZolin >=64 mcg/mL Resistant     cefepime  Resistant     cefTRIAXone >=64 mcg/mL Resistant     ciprofloxacin >=4 mcg/mL Resistant     ertapenem <=0.12 mcg/mL Sensitive     gentamicin <=1 mcg/mL Sensitive     levofloxacin >=8 mcg/mL Resistant     trimethoprim-sulfamethoxazole <=20 mcg/mL Sensitive          IMAGING:  CXR 1/27/22  Impression   Bilateral upper lobe opacification superimposed on chronic interstitial   changes concerning for pneumonia. Assessment:     Patient Active Problem List    Diagnosis Date Noted    S/P CABG x 4 04/02/2013     Priority: High     Overview Note:     Pre-operative Diagnosis: MV CAD, Paroxysmal afib, S/p NSTEMI   Procedure: CABG 4, mini Maze, Left atrial appendage ligation, Rib blocks, Platelet gel application             Coronary atherosclerosis of native coronary artery 03/30/2013     Priority: High     Overview Note:     3/13 Cath: Three vessel disease, needs CABG  4/13 Coronary artery bypass graft x4, Mini Maze, Left atrial appendage ligation, LIMA to the LAD, Saphenous vein graft to diagonal, everse saphenous vein graft to OM2,  Reverse saphenous vein graft to right coronary artery.   6/13 Toltzis:  Doing OK, increase the Valsartan to 80 mg daily  7/14 Toltzis:  Echo shows LVH, normal EF, RV 19 + JVP, stable DVT, short runs only, no medication changes, Amiodarone 200 mg daily and Metoprolol 100 mg daily        HTN (hypertension) 08/18/2010     Priority: High    Adrenal mass ([facility] [address] 05/22/2010     Priority: High     Overview Note:     6/05 ACT:  Adrenal adenoma noted on CT, 2.2 x 1 cm  11/07 ACT: no changes adrenal mass (actually not even seen! ! )      HYPERLIPIDEMIA 05/17/2010     Priority: High    Acute blood loss anemia 04/03/2013     Priority: Medium    Atrial fibrillation ([facility] [address] 05/12/2010     Priority: Medium     Overview Note:     5/10 Echo: Mod LA dilatation, mild MR and TR, normal LV  5/10 Toltzis:  Added Norvasc 5 qd for BP  6/09 Echo:   Moderate increase LA size, otherwise (-)  10/07 Stress Echo (-)  5/08 Atrial fib:  Cardioverted in the ER, no anticoagulation, heme positive stool  11/05 Event monitor:  Runs of PAT  10/05 Stress Echo (-)  8/04 palpitations:  Echo showed diastolic dysfunction, holter monitor positive for brief runs of SVT  7/12 Toltzis:  Episodes of atrial fib short lived, infrequent, continue same meds, Echo done today showed RVP 24, normal EF, moderate LA dilatation, mitral valve prolapse  8/13 Toltzis:  Restart Norvasc 5 mg daily  11/13 Toltzis:  Stop Xarelto      Sleep apnea 07/04/2012     Priority: Low     Overview Note:     8/12 Corser:  CPAP 5      Diplopia 06/10/2012     Priority: Low     Overview Note:     6/12 [place]:  Has diplopia, will check ACHR antibody, but doubt Myasthenia Gravis, cause not felt to be serious  6/12 ACHR antibody (-)      Steatosis, liver 05/22/2010     Priority: Low     Overview Note:     3/07 Liver biopsy:  Fatty liver disease, no autoimmune hepatitis      Acute bronchitis 05/17/2010     Priority: Low    Smoking 05/17/2010     Priority: Low    MCALLISTER 05/17/2010     Priority: Low    Diverticulitis of colon 05/17/2010     Priority: Low     Overview Note:     9/05 ACT:  diverticuli on CT      Calculus of kidney 05/17/2010     Priority: Low     Overview Note:     6/05 Nephrolithiasis  11/07 ACT:  Bilateral renal cysts, 7 cm on the left and 3 cm on the right, no adrenal mass  6/05 24 hour urine collection (-)  8/10 ACT: Bilateral renal cysts, otherwise (-)      Benign neoplasm of colon 05/17/2010     Priority: Low    Pneumonia of both lower lobes due to Escherichia coli ([facility] [address] 01/25/2022    SOB (shortness of breath) 01/24/2022    Chest congestion 01/24/2022    Pulmonary infiltrates 01/24/2022    Lung nodules 01/24/2022    Mediastinal adenopathy 01/24/2022    Grade II diastolic dysfunction 22/04/8819    Pulmonary HTN ([address] 01/24/2022    H/O pneumonia due to Pseudomonas 01/24/2022    Former smoker 01/24/2022    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    NICHOLAS (obstructive sleep apnea) 01/24/2022    Leukocytosis 01/24/2022    Tachycardia 01/22/2022       Acute on chronic hypoxemic resp failure   Baseline is 3L   -improving     Chronic lung disease, ILD, bronchiectasis  Colonization with MDRO     Pneumonia, MDRO E coli isolated in bronch culture     NKDA     Plan:     Meropenem day #5  He is clinically improved  He is amenable to IV abx after discharge  Will order midline catheter and plan to continue with IV ertapenem after DC     Taper or stop steroid per Hospitalist / Pulm       Recommended Follow-up, Labs or Other Treatments After Discharge:      ID INFUSION ORDERS  Ertapenem 1g IV q24 continued through 2/4/22 Mondays - CBC diff, CMP faxed to [phone]  Routine CVC care  Call with ID related concerns and see Dr. Rudy Sanchez in 4-6 weeks, [phone]   Shmuel Maddox MD         Discussed with patient/family, all questions answered        Antoine Ortiz MD  Phone: [phone]   Fax : [phone]

## 2022-01-29 NOTE — PROGRESS NOTES
Hospitalist Progress Note      PCP: Fly Fisher MD    Date of Admission: 1/22/2022    Chief Complaint: tachycardia, SOB    Hospital Course:   67 y.o. male, with PMH of atrial fibrillation, HTN, CAD and HLD, who presented to [facility] with tachycardia and shortness of breath. Pt stated he has been experiencing generalized weakness since November. He stated he has also been experiencing an increase in shortness of breath for the last 1 to 2 months. The pt does have a history of bronchiectasis and follows with pulmonology. He stated he usually wears 3 L nasal cannula of supplemental oxygen at home at baseline, but has had to increase it recently to 6 L nasal cannula. The pt stated he also has a history of tachycardia/atrial fibrillation. He stated he follows with Dr. Wilma Kebede from cardiology and Dr. Clari Bergman from EP. The pt stated when he has episodes of tachycardia and rest for the last 10 to 13 seconds and resolves on its own. However, the patient stated over the last week or so his tachycardia has not been resolving. He stated today his heart rate went as high as 150 so he went to the ED for further evaluation. In the ED a twelve-lead EKG was done that did confirm he was in atrial fibrillation with RVR. He was given 1 L of normal saline. He denied any other associated symptoms as well as any aggravating and/or alleviating factors. At the time of this assessment, the patient was resting comfortably in bed. He currently denies any chest pain, back pain, abdominal pain, shortness of breath, numbness, tingling, N/V/C/D, fever and/or chills. Subjective:   Pt is on 7 liters HFNC. Afebrile. VSS. +dyspnea, no chest pain, no N/V/D.      Medications:  Reviewed    Infusion Medications    sodium chloride 25 mL (01/25/22 0927)     Scheduled Medications    guaiFENesin  600 mg Oral BID    methylPREDNISolone  40 mg IntraVENous Q12H    dilTIAZem  120 mg Oral BID    meropenem  1,000 mg IntraVENous Q8H    melatonin  5 mg Oral Nightly    enoxaparin  40 mg SubCUTAneous Daily    aspirin  81 mg Oral Daily    atorvastatin  40 mg Oral Daily    ferrous sulfate  325 mg Oral BID    FLUoxetine  10 mg Oral Daily    metoprolol tartrate  50 mg Oral BID    budesonide-formoterol  1 puff Inhalation BID    sodium chloride flush  5-40 mL IntraVENous 2 times per day     PRN Meds: hydrOXYzine, sodium chloride, albuterol, diphenhydrAMINE, sodium chloride flush, sodium chloride, ondansetron **OR** ondansetron, polyethylene glycol, acetaminophen **OR** acetaminophen, perflutren lipid microspheres      Intake/Output Summary (Last 24 hours) at 1/29/2022 1405  Last data filed at 1/29/2022 1346  Gross per 24 hour   Intake 490 ml   Output 745 ml   Net -255 ml       Physical Exam Performed:    BP (!) 144/82   Pulse 92   Temp 97.9 °F (36.6 °C) (Oral)   Resp 22   Ht 5' 8\" (1.727 m)   Wt 176 lb 5.9 oz (80 kg)   SpO2 92%   BMI 26.82 kg/m²     General appearance: No apparent distress, appears stated age and cooperative. HEENT: Pupils equal, round, and reactive to light. Conjunctivae/corneas clear. Neck: Supple, with full range of motion. No jugular venous distention. Trachea midline. Respiratory:  Increased respiratory effort. Crackles/rhonchi. Cardiovascular: Regular rate and rhythm with normal S1/S2 without murmurs, rubs or gallops. Abdomen: Soft, non-tender, non-distended with normal bowel sounds. Musculoskeletal: No clubbing, cyanosis or edema bilaterally. Full range of motion without deformity. Skin: Skin color, texture, turgor normal.  No rashes or lesions. Neurologic:  Neurovascularly intact without any focal sensory/motor deficits.  Cranial nerves: II-XII intact, grossly non-focal.  Psychiatric: Alert and oriented, thought content appropriate, normal insight  Capillary Refill: Brisk,3 seconds, normal   Peripheral Pulses: +2 palpable, equal bilaterally       Labs:   Recent Labs     01/27/22  0526 01/28/22  0529 01/29/22  0511   WBC 13.3* 7.6 11.0   HGB 7.9* 7.9* 7.9*   HCT 25.2* 24.9* 25.4*    376 376     Recent Labs     01/27/22  0526 01/28/22  0529 01/29/22  0511    138 136   K 4.4 5.0 4.7    102 100   CO2 29 31 31   BUN 31* 27* 30*   CREATININE 0.7* 0.6* <0.5*   CALCIUM 9.1 9.1 9.0     Urinalysis:      Lab Results   Component Value Date    NITRU Negative 06/23/2014    WBCUA 0-2 12/08/2013    BACTERIA 1+ 08/07/2010    RBCUA 0-2 12/08/2013    BLOODU Negative 06/23/2014    SPECGRAV 1.010 06/23/2014    GLUCOSEU Negative 06/23/2014    GLUCOSEU NEGATIVE 08/07/2010       Radiology:  IR MIDLINE CATH   Final Result      XR CHEST PORTABLE   Final Result   Bilateral upper lobe opacification superimposed on chronic interstitial   changes concerning for pneumonia. XR CHEST PORTABLE   Final Result   Chronic appearing reticulonodular opacities.              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]      Priority: High    HTN (hypertension) [I10]      Priority: High    Atrial fibrillation (HCC) [I48.91]      Priority: Medium    Severe malnutrition (HCC) [E43]     Pneumonia of both lower lobes due to Escherichia coli (HCC) [J15.5]     SOB (shortness of breath) [R06.02]     Chest congestion [R09.89]     Pulmonary infiltrates [R91.8]     Lung nodules [R91.8]     Mediastinal adenopathy [R59.0]     Grade II diastolic dysfunction [P12.46]     Pulmonary HTN ([address] [I27.20]     H/O pneumonia due to Pseudomonas [Z87.01]     Former smoker [Z87.891]     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 Coli ESBL / MDRO; cytology negative for malignancy, fungal forms or PCP  - continue IV merrem, ID consulted / following, plan for IV merrem through till 2/4  - continue IV solumedrol for now -- defer to Pulm if needs to be continued   - continue inhaled bronchodilator therapy   - wean O2 as able, on 3L NC at baseline, currently requiring BiPAP / HFNC  - rapid COVID negative   - pulmonary toilet with IS and acapella, added mucinex     HTN  - well controlled  - continue metoprolol, cardizem    HLD  - continue home statin    Anemia, chronic  - H&H stable  - no overt signs of bleeding  - continue to monitor      DVT Prophylaxis: lovenox  Diet: ADULT DIET;  Regular  ADULT ORAL NUTRITION SUPPLEMENT; Lunch, Dinner; Frozen Oral Supplement  Code Status: Full Code    PT/OT Eval Status: ordered with recs for SNF    Dispo - uncertain, likely several more days     TOAN Chambers - CNP

## 2022-01-29 NOTE — PROGRESS NOTES
[facility] Pulmonary/CCM Progress note      Admit Date: 1/22/2022    Chief Complaint: Shortness of breath    Subjective: Interval History: Improved dyspnea, currently on 8 L O2. No significant cough or phlegm.     Scheduled Meds:   guaiFENesin  600 mg Oral BID    methylPREDNISolone  40 mg IntraVENous Q12H    dilTIAZem  120 mg Oral BID    meropenem  1,000 mg IntraVENous Q8H    melatonin  5 mg Oral Nightly    enoxaparin  40 mg SubCUTAneous Daily    aspirin  81 mg Oral Daily    atorvastatin  40 mg Oral Daily    ferrous sulfate  325 mg Oral BID    FLUoxetine  10 mg Oral Daily    metoprolol tartrate  50 mg Oral BID    budesonide-formoterol  1 puff Inhalation BID    sodium chloride flush  5-40 mL IntraVENous 2 times per day     Continuous Infusions:   sodium chloride 25 mL (01/25/22 8387)     PRN Meds:hydrOXYzine, sodium chloride, albuterol, diphenhydrAMINE, sodium chloride flush, sodium chloride, ondansetron **OR** ondansetron, polyethylene glycol, acetaminophen **OR** acetaminophen, perflutren lipid microspheres    Review of Systems  Constitutional: negative for fatigue, fevers, malaise and weight loss  Ears, nose, mouth, throat: negative for ear drainage, epistaxis, hoarseness, nasal congestion, sore throat and voice change  Respiratory: negative except for shortness of breath  Cardiovascular: negative for chest pain, chest pressure/discomfort, irregular heart beat, lower extremity edema and palpitations  Gastrointestinal: negative for abdominal pain, constipation, diarrhea, jaundice, melena, odynophagia, reflux symptoms and vomiting  Hematologic/lymphatic: negative for bleeding, easy bruising, lymphadenopathy and petechiae  Musculoskeletal:negative for arthralgias, bone pain, muscle weakness, neck pain and stiff joints  Neurological: negative for dizziness, gait problems, headaches, seizures, speech problems, tremors and weakness  Behavioral/Psych: negative for anxiety, behavior problems, depression, fatigue and sleep disturbance  Endocrine: negative for diabetic symptoms including none, neuropathy, polyphagia, polyuria, polydipsia, vomiting and diarrhea and temperature intolerance  Allergic/Immunologic: negative for anaphylaxis, angioedema, hay fever and urticaria    Objective:     Patient Vitals for the past 8 hrs:   BP Temp Temp src Pulse Resp SpO2   01/29/22 1700 131/74 97.7 °F (36.5 °C) Oral 81 18 97 %   01/29/22 1216 129/72 97.6 °F (36.4 °C) Oral 86 20 97 %     I/O last 3 completed shifts: In: 340 [P.O.:340]  Out: 570 [Urine:570]  I/O this shift:  In: 610 [P.O.:600;  I.V.:10]  Out: 425 [Urine:425]    General Appearance: alert and oriented to person, place and time, well developed and well- nourished, in no acute distress  Skin: warm and dry, no rash or erythema  Head: normocephalic and atraumatic  Eyes: pupils equal, round, and reactive to light, extraocular eye movements intact, conjunctivae normal  ENT: external ear and ear canal normal bilaterally, nose without deformity, nasal mucosa and turbinates normal  Neck: supple and non-tender without mass, no cervical lymphadenopathy  Pulmonary/Chest: clear to auscultation bilaterally- no wheezes, rales or rhonchi, normal air movement, no respiratory distress  Cardiovascular: normal rate, regular rhythm,  no murmurs, rubs, distal pulses intact, no carotid bruits  Abdomen: soft, non-tender, non-distended, normal bowel sounds, no masses or organomegaly  Lymph Nodes: Cervical, supraclavicular normal  Extremities: no cyanosis, clubbing or edema  Musculoskeletal: normal range of motion, no joint swelling, deformity or tenderness  Neurologic: alert, no focal neurologic deficits    Data Review:  CBC:   Lab Results   Component Value Date    WBC 11.0 01/29/2022    RBC 2.77 01/29/2022     BMP:   Lab Results   Component Value Date    GLUCOSE 121 01/29/2022    CO2 31 01/29/2022    BUN 30 01/29/2022    CREATININE <0.5 01/29/2022    CALCIUM 9.0 01/29/2022     ABG:   Lab Results   Component Value Date    AZO6CQW 27.6 04/05/2013    BEART 3.6 04/05/2013    W9APEKKF 95.3 04/05/2013    PHART 7.462 04/05/2013    THGBART 9.9 04/05/2013    YDP2DBJ 39.5 04/05/2013    PO2ART 75.4 04/05/2013    RQO0AUF 28.8 04/05/2013       Radiology: All pertinent images / reports were reviewed as a part of this visit. Narrative   EXAMINATION:   ONE XRAY VIEW OF THE CHEST       1/27/2022 11:06 am       COMPARISON:   01/22/2022.       HISTORY:   ORDERING SYSTEM PROVIDED HISTORY: worsening hypoxemia   TECHNOLOGIST PROVIDED HISTORY:   Reason for exam:->worsening hypoxemia   Reason for Exam: worsening hypoxia       FINDINGS:   The cardiac silhouette is borderline in size and stable with post sternotomy   changes. Aortic vascular calcification.  Coarsened interstitial markings   throughout the lungs. There is superimposed increased opacity within the left   upper lobe and right lung apex concerning for pneumonia.  No significant free   pleural fluid with stable mild blunting of the costophrenic sulci bilaterally.           Impression   Bilateral upper lobe opacification superimposed on chronic interstitial   changes concerning for pneumonia. Problem List:     E. coli pneumonia  Acute hypoxic respiratory failure  History of moderate COPD  Assessment/Plan:     Bronchoscopy and BAL from 1/24 revealed E. coli pneumonia-ESBL. History of Pseudomonas and paecilomyces. History of bronchiectasis/ILD not typical of IPF. Continue on current inhaler regime with Symbicort. Currently on meropenem for ESBL E. coli, plans for ertapenem upon discharge. Patient is still not ready for discharge.     Pulmonary will follow  Prasanna Brooks MD

## 2022-01-30 NOTE — PROGRESS NOTES
Hospitalist Progress Note      PCP: Ksenia Cole MD    Date of Admission: 1/22/2022    Chief Complaint: tachycardia, SOB    Hospital Course:   67 y.o. male, with PMH of atrial fibrillation, HTN, CAD and HLD, who presented to [facility] with tachycardia and shortness of breath. Pt stated he has been experiencing generalized weakness since November. He stated he has also been experiencing an increase in shortness of breath for the last 1 to 2 months. The pt does have a history of bronchiectasis and follows with pulmonology. He stated he usually wears 3 L nasal cannula of supplemental oxygen at home at baseline, but has had to increase it recently to 6 L nasal cannula. The pt stated he also has a history of tachycardia/atrial fibrillation. He stated he follows with Dr. Gabby Marcial from cardiology and Dr. Darek Corrales from EP. The pt stated when he has episodes of tachycardia and rest for the last 10 to 13 seconds and resolves on its own. However, the patient stated over the last week or so his tachycardia has not been resolving. He stated today his heart rate went as high as 150 so he went to the ED for further evaluation. In the ED a twelve-lead EKG was done that did confirm he was in atrial fibrillation with RVR. He was given 1 L of normal saline. He denied any other associated symptoms as well as any aggravating and/or alleviating factors. At the time of this assessment, the patient was resting comfortably in bed. He currently denies any chest pain, back pain, abdominal pain, shortness of breath, numbness, tingling, N/V/C/D, fever and/or chills. Subjective:   Pt is on 7 liters HFNC. Afebrile. VSS. +FINNEGAN, no chest pain, no N/V/D.      Medications:  Reviewed    Infusion Medications    sodium chloride 25 mL (01/30/22 0011)     Scheduled Medications    guaiFENesin  600 mg Oral BID    methylPREDNISolone  40 mg IntraVENous Q12H    dilTIAZem  120 mg Oral BID    meropenem  1,000 mg IntraVENous Q8H    melatonin  5 mg Oral Nightly    enoxaparin  40 mg SubCUTAneous Daily    aspirin  81 mg Oral Daily    atorvastatin  40 mg Oral Daily    ferrous sulfate  325 mg Oral BID    FLUoxetine  10 mg Oral Daily    metoprolol tartrate  50 mg Oral BID    budesonide-formoterol  1 puff Inhalation BID    sodium chloride flush  5-40 mL IntraVENous 2 times per day     PRN Meds: hydrOXYzine, sodium chloride, albuterol, diphenhydrAMINE, sodium chloride flush, sodium chloride, ondansetron **OR** ondansetron, polyethylene glycol, acetaminophen **OR** acetaminophen, perflutren lipid microspheres      Intake/Output Summary (Last 24 hours) at 1/30/2022 1432  Last data filed at 1/30/2022 0925  Gross per 24 hour   Intake 720 ml   Output 450 ml   Net 270 ml       Physical Exam Performed:    /65   Pulse 83   Temp 98 °F (36.7 °C) (Oral)   Resp 22   Ht 5' 8\" (1.727 m)   Wt 176 lb 5.9 oz (80 kg)   SpO2 98%   BMI 26.82 kg/m²     General appearance: No apparent distress, appears stated age and cooperative. HEENT: Pupils equal, round, and reactive to light. Conjunctivae/corneas clear. Neck: Supple, with full range of motion. No jugular venous distention. Trachea midline. Respiratory:  Increased respiratory effort. Crackles/rhonchi. Cardiovascular: Regular rate and rhythm with normal S1/S2 without murmurs, rubs or gallops. Abdomen: Soft, non-tender, non-distended with normal bowel sounds. Musculoskeletal: No clubbing, cyanosis or edema bilaterally. Full range of motion without deformity. Skin: Skin color, texture, turgor normal.  No rashes or lesions. Neurologic:  Neurovascularly intact without any focal sensory/motor deficits.  Cranial nerves: II-XII intact, grossly non-focal.  Psychiatric: Alert and oriented, thought content appropriate, normal insight  Capillary Refill: Brisk,3 seconds, normal   Peripheral Pulses: +2 palpable, equal bilaterally       Labs:   Recent Labs     01/28/22  0529 01/29/22  0511 01/30/22  0514   WBC 7.6 11.0 9. 6   HGB 7.9* 7.9* 8.0*   HCT 24.9* 25.4* 25.0*    376 352     Recent Labs     01/28/22  0529 01/29/22  0511 01/30/22  0514    136 137   K 5.0 4.7 5.0    100 102   CO2 31 31 33*   BUN 27* 30* 35*   CREATININE 0.6* <0.5* 0.6*   CALCIUM 9.1 9.0 9.2     Urinalysis:      Lab Results   Component Value Date    NITRU Negative 06/23/2014    WBCUA 0-2 12/08/2013    BACTERIA 1+ 08/07/2010    RBCUA 0-2 12/08/2013    BLOODU Negative 06/23/2014    SPECGRAV 1.010 06/23/2014    GLUCOSEU Negative 06/23/2014    GLUCOSEU NEGATIVE 08/07/2010       Radiology:  IR MIDLINE CATH   Final Result      XR CHEST PORTABLE   Final Result   Bilateral upper lobe opacification superimposed on chronic interstitial   changes concerning for pneumonia. XR CHEST PORTABLE   Final Result   Chronic appearing reticulonodular opacities.              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]      Priority: High    HTN (hypertension) [I10]      Priority: High    Atrial fibrillation (HCC) [I48.91]      Priority: Medium    Severe malnutrition (HCC) [E43]     Pneumonia of both lower lobes due to Escherichia coli (HCC) [J15.5]     SOB (shortness of breath) [R06.02]     Chest congestion [R09.89]     Pulmonary infiltrates [R91.8]     Lung nodules [R91.8]     Mediastinal adenopathy [R59.0]     Grade II diastolic dysfunction [E19.10]     Pulmonary HTN ([address] [I27.20]     H/O pneumonia due to Pseudomonas [Z87.01]     Former smoker [Z87.891]     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 Coli ESBL / MDRO; cytology negative for malignancy, fungal forms or PCP  - continue IV merrem, ID consulted / following, plan for IV merrem through till 2/4  - continue IV solumedrol for now -- defer to Pulm if needs to be continued   - continue inhaled bronchodilator therapy   - wean O2 as able, on 3L NC at baseline, currently requiring BiPAP / HFNC  - rapid COVID negative   - pulmonary toilet with IS and acapella, added mucinex     HTN  - well controlled  - continue metoprolol, cardizem    HLD  - continue home statin    Anemia, chronic  - H&H stable  - no overt signs of bleeding  - continue to monitor      DVT Prophylaxis: lovenox  Diet: ADULT DIET;  Regular  ADULT ORAL NUTRITION SUPPLEMENT; Lunch, Dinner; Frozen Oral Supplement  Code Status: Full Code    PT/OT Eval Status: ordered with recs for SNF    Dispo - 2-3 days     TOAN Mercado - CNP

## 2022-01-31 NOTE — PROGRESS NOTES
Hospitalist Progress Note      PCP: Luz Sauer MD    Date of Admission: 1/22/2022    Chief Complaint: Tachycardia/SOB    Subjective: no new c/o. Medications:  Reviewed    Infusion Medications    sodium chloride 25 mL (01/30/22 0011)     Scheduled Medications    guaiFENesin  600 mg Oral BID    methylPREDNISolone  40 mg IntraVENous Q12H    dilTIAZem  120 mg Oral BID    meropenem  1,000 mg IntraVENous Q8H    melatonin  5 mg Oral Nightly    enoxaparin  40 mg SubCUTAneous Daily    aspirin  81 mg Oral Daily    atorvastatin  40 mg Oral Daily    ferrous sulfate  325 mg Oral BID    FLUoxetine  10 mg Oral Daily    metoprolol tartrate  50 mg Oral BID    budesonide-formoterol  1 puff Inhalation BID    sodium chloride flush  5-40 mL IntraVENous 2 times per day     PRN Meds: hydrOXYzine, sodium chloride, albuterol, diphenhydrAMINE, sodium chloride flush, sodium chloride, ondansetron **OR** ondansetron, polyethylene glycol, acetaminophen **OR** acetaminophen, perflutren lipid microspheres      Intake/Output Summary (Last 24 hours) at 1/31/2022 0857  Last data filed at 1/31/2022 0547  Gross per 24 hour   Intake 600 ml   Output 925 ml   Net -325 ml       Physical Exam Performed:    /78   Pulse 80   Temp 97.5 °F (36.4 °C) (Oral)   Resp 22   Ht 5' 8\" (1.727 m)   Wt 177 lb 11.1 oz (80.6 kg)   SpO2 95%   BMI 27.02 kg/m²     General appearance: No apparent distress, appears stated age and cooperative. HEENT: Pupils equal, round, and reactive to light. Conjunctivae/corneas clear. Neck: Supple, with full range of motion. No jugular venous distention. Trachea midline. Respiratory:  Normal respiratory effort. Clear to auscultation, bilaterally without Rales/Wheezes/Rhonchi. Cardiovascular: Regular rate and rhythm with normal S1/S2 without murmurs, rubs or gallops. Abdomen: Soft, non-tender, non-distended with normal bowel sounds. Musculoskeletal: No clubbing, cyanosis or edema bilaterally. Full range of motion without deformity. Skin: Skin color, texture, turgor normal.  No rashes or lesions. Neurologic:  Neurovascularly intact without any focal sensory/motor deficits. Cranial nerves: II-XII intact, grossly non-focal.  Psychiatric: Alert and oriented, thought content appropriate, normal insight  Capillary Refill: Brisk,< 3 seconds   Peripheral Pulses: +2 palpable, equal bilaterally       Labs:   Recent Labs     01/29/22  0511 01/30/22  0514 01/31/22  0520   WBC 11.0 9.6 10.5   HGB 7.9* 8.0* 8.2*   HCT 25.4* 25.0* 25.9*    352 399     Recent Labs     01/29/22  0511 01/30/22  0514 01/31/22  0520    137  --    K 4.7 5.0  --     102  --    CO2 31 33*  --    BUN 30* 35*  --    CREATININE <0.5* 0.6* <0.5*   CALCIUM 9.0 9.2  --      No results for input(s): AST, ALT, BILIDIR, BILITOT, ALKPHOS in the last 72 hours. No results for input(s): INR in the last 72 hours. No results for input(s): [employer] in the last 72 hours.     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]      Priority: High    HTN (hypertension) [I10]      Priority: High    Atrial fibrillation (HCC) [I48.91]      Priority: Medium    Severe malnutrition (HCC) [E43]     Lung nodules [R91.8]             Afib with RVR, currently rate controlled   - cardiology consulted, now s/o  - stopped amiodarone given ineffectiveness and risk for pulmonary toxicity; currently on metoprolol and cardizem  - no AC due to history of GI bleed  - ECHO shows normal LVEF of 55%, RV volume overload  - monitor pt on telemetry      Acute on chronic hypoxic respiratory failure 2/2 bronchiectasis / COPD  - pulmonary following; s/p bronch on 1/24  - +PCT elevation, BAL grew E. Coli ESBL / MDRO; cytology negative for malignancy, fungal forms or PCP  - continue IV merrem, ID consulted / following, plan for IV merrem through till 2/4  - continue IV solumedrol for now -- defer to Pulm if needs to be continued   - continue inhaled bronchodilator therapy   - wean O2 as able, on 3L NC at baseline, currently requiring BiPAP / HFNC  - rapid COVID negative   - pulmonary toilet with IS and acapella, added mucinex      HTN - w/out known CAD and no evidence of active signs/sxs of ischemia/failure. Currently controlled on home meds w/ vitals reviewed and documented as above. HyperLipidemia - controlled on home Statin. Continue, w/ f/u and med adjustment w/ PCP    Anemia - etiology clinically unable to determine, w/out evidence of active bleeding/hemolysis. Asymptomatic w/out indication for transfusion. Follow serial labs. Reviewed and documented as above.      DVT Prophylaxis: LMWH     Recent Labs     01/29/22  0511 01/30/22  0514 01/31/22  0520    352 399     Diet: ADULT DIET; Regular  ADULT ORAL NUTRITION SUPPLEMENT; Lunch, Dinner; Frozen Oral Supplement  Code Status: Full Code      PT/OT Eval Status: seen w/ recs for SNF. Dispo - Patient is likely to remain in-house at least until Tues/Wed 1/2 Feb pending clinical course, subspecialist recs and placement decision.        Abran Schaeffer MD

## 2022-01-31 NOTE — CARE COORDINATION
Spoke with MD who states he is not ready to discharge patient today. He would like to wean patient a little more. Spoke with patient to discuss the above and he states he is glad he has more time here as he does not feel ready to discharge. The plan is for patient to discharge to [facility] with no precert needed. Will continue to follow.

## 2022-01-31 NOTE — PROGRESS NOTES
[facility] Pulmonary/CCM Progress note      Admit Date: 1/22/2022    Chief Complaint: Shortness of breath    Subjective: Interval History: Slowly continues to improve, patient states that his breathing feels better today. No significant cough or phlegm.   Currently on 6 L O2    Scheduled Meds:   guaiFENesin  600 mg Oral BID    methylPREDNISolone  40 mg IntraVENous Q12H    dilTIAZem  120 mg Oral BID    meropenem  1,000 mg IntraVENous Q8H    melatonin  5 mg Oral Nightly    enoxaparin  40 mg SubCUTAneous Daily    aspirin  81 mg Oral Daily    atorvastatin  40 mg Oral Daily    ferrous sulfate  325 mg Oral BID    FLUoxetine  10 mg Oral Daily    metoprolol tartrate  50 mg Oral BID    budesonide-formoterol  1 puff Inhalation BID    sodium chloride flush  5-40 mL IntraVENous 2 times per day     Continuous Infusions:   sodium chloride 25 mL (01/30/22 0011)     PRN Meds:hydrOXYzine, sodium chloride, albuterol, diphenhydrAMINE, sodium chloride flush, sodium chloride, ondansetron **OR** ondansetron, polyethylene glycol, acetaminophen **OR** acetaminophen, perflutren lipid microspheres    Review of Systems  Constitutional: negative for fatigue, fevers, malaise and weight loss  Ears, nose, mouth, throat: negative for ear drainage, epistaxis, hoarseness, nasal congestion, sore throat and voice change  Respiratory: negative except for shortness of breath  Cardiovascular: negative for chest pain, chest pressure/discomfort, irregular heart beat, lower extremity edema and palpitations  Gastrointestinal: negative for abdominal pain, constipation, diarrhea, jaundice, melena, odynophagia, reflux symptoms and vomiting  Hematologic/lymphatic: negative for bleeding, easy bruising, lymphadenopathy and petechiae  Musculoskeletal:negative for arthralgias, bone pain, muscle weakness, neck pain and stiff joints  Neurological: negative for dizziness, gait problems, headaches, seizures, speech problems, tremors and weakness  Behavioral/Psych: negative for anxiety, behavior problems, depression, fatigue and sleep disturbance  Endocrine: negative for diabetic symptoms including none, neuropathy, polyphagia, polyuria, polydipsia, vomiting and diarrhea and temperature intolerance  Allergic/Immunologic: negative for anaphylaxis, angioedema, hay fever and urticaria    Objective:     Patient Vitals for the past 8 hrs:   BP Temp Temp src Pulse Resp SpO2   01/30/22 1825 133/70 98.3 °F (36.8 °C) Oral 92 20 97 %   01/30/22 1222 139/65 98 °F (36.7 °C) Oral 83 22 98 %     I/O last 3 completed shifts: In: 1210 [P.O.:1200; I.V.:10]  Out: 1225 [Urine:1225]  No intake/output data recorded.     General Appearance: alert and oriented to person, place and time, well developed and well- nourished, in no acute distress  Skin: warm and dry, no rash or erythema  Head: normocephalic and atraumatic  Eyes: pupils equal, round, and reactive to light, extraocular eye movements intact, conjunctivae normal  ENT: external ear and ear canal normal bilaterally, nose without deformity, nasal mucosa and turbinates normal  Neck: supple and non-tender without mass, no cervical lymphadenopathy  Pulmonary/Chest: clear to auscultation bilaterally- no wheezes, rales or rhonchi, normal air movement, no respiratory distress  Cardiovascular: normal rate, regular rhythm,  no murmurs, rubs, distal pulses intact, no carotid bruits  Abdomen: soft, non-tender, non-distended, normal bowel sounds, no masses or organomegaly  Lymph Nodes: Cervical, supraclavicular normal  Extremities: no cyanosis, clubbing or edema  Musculoskeletal: normal range of motion, no joint swelling, deformity or tenderness  Neurologic: alert, no focal neurologic deficits    Data Review:  CBC:   Lab Results   Component Value Date    WBC 9.6 01/30/2022    RBC 2.74 01/30/2022     BMP:   Lab Results   Component Value Date    GLUCOSE 120 01/30/2022    CO2 33 01/30/2022    BUN 35 01/30/2022    CREATININE 0.6 01/30/2022    CALCIUM 9.2 01/30/2022     ABG:   Lab Results   Component Value Date    DNB3XSM 27.6 04/05/2013    BEART 3.6 04/05/2013    K3PLGDLA 95.3 04/05/2013    PHART 7.462 04/05/2013    THGBART 9.9 04/05/2013    ENR2JGL 39.5 04/05/2013    PO2ART 75.4 04/05/2013    HQG9MZF 28.8 04/05/2013       Radiology: All pertinent images / reports were reviewed as a part of this visit. Narrative   EXAMINATION:   ONE XRAY VIEW OF THE CHEST       1/27/2022 11:06 am       COMPARISON:   01/22/2022.       HISTORY:   ORDERING SYSTEM PROVIDED HISTORY: worsening hypoxemia   TECHNOLOGIST PROVIDED HISTORY:   Reason for exam:->worsening hypoxemia   Reason for Exam: worsening hypoxia       FINDINGS:   The cardiac silhouette is borderline in size and stable with post sternotomy   changes. Aortic vascular calcification.  Coarsened interstitial markings   throughout the lungs. There is superimposed increased opacity within the left   upper lobe and right lung apex concerning for pneumonia.  No significant free   pleural fluid with stable mild blunting of the costophrenic sulci bilaterally.           Impression   Bilateral upper lobe opacification superimposed on chronic interstitial   changes concerning for pneumonia. Problem List:     E. coli pneumonia  Acute hypoxic respiratory failure  History of moderate COPD  Assessment/Plan:     Bronchoscopy and BAL from 1/24 revealed E. coli pneumonia-ESBL. History of Pseudomonas and paecilomyces. History of bronchiectasis/ILD not typical of IPF. Continue on current inhaler regime with Symbicort. Still requiring 6 L O2-baseline 2 to 3 L at home. Currently on meropenem for ESBL E. coli, plans for ertapenem upon discharge. Improving slowly, might be ready for discharge in the next 24 to 48 hours.     Pulmonary will follow  Lee Ann Holm MD

## 2022-01-31 NOTE — PROGRESS NOTES
Infectious Disease Follow up Notes    CC :  Pneumonia, bronchiectasis, MDRO      Antibiotics:   Meropenem 1g q8    Solumedrol 40 q12    Admit Date:   1/22/2022  Hospital Day: 10    Subjective:   He remains AF  On 5L NC   Feels better     Objective:     Patient Vitals for the past 8 hrs:   BP Temp Temp src Pulse Resp SpO2   01/31/22 1110 122/67 97.7 °F (36.5 °C) Oral 85 20 95 %   01/31/22 0930 133/66 97.5 °F (36.4 °C) Oral 87 21 97 %       EXAM:  General:   Alert, cooperative, NAD   HEENT:   NCAT, PERRL, sclera anicteric  NECK:   Supple, symmetrical   LUNGS:   Non-labored breathing  Upper lobes clear  CV:   RRR   ABD: Soft, flat, NT     EXT:  No focal rash        LINE:   Midline placed 1/28/22        Scheduled Meds:   guaiFENesin  600 mg Oral BID    methylPREDNISolone  40 mg IntraVENous Q12H    dilTIAZem  120 mg Oral BID    meropenem  1,000 mg IntraVENous Q8H    melatonin  5 mg Oral Nightly    enoxaparin  40 mg SubCUTAneous Daily    aspirin  81 mg Oral Daily    atorvastatin  40 mg Oral Daily    ferrous sulfate  325 mg Oral BID    FLUoxetine  10 mg Oral Daily    metoprolol tartrate  50 mg Oral BID    budesonide-formoterol  1 puff Inhalation BID    sodium chloride flush  5-40 mL IntraVENous 2 times per day       Continuous Infusions:   sodium chloride 25 mL (01/30/22 0011)          Data Review:    Lab Results   Component Value Date    WBC 10.5 01/31/2022    HGB 8.2 (L) 01/31/2022    HCT 25.9 (L) 01/31/2022    MCV 91.2 01/31/2022     01/31/2022     Lab Results   Component Value Date    CREATININE <0.5 (L) 01/31/2022    BUN 36 (H) 01/31/2022     01/31/2022    K 5.6 (H) 01/31/2022     01/31/2022    CO2 28 01/31/2022       Hepatic Function Panel:   Lab Results   Component Value Date    ALKPHOS 148 01/22/2022    ALT 21 01/22/2022    AST 34 01/22/2022    PROT 8.3 01/22/2022    PROT 6.7 01/08/2013    BILITOT 0.5 01/22/2022    BILIDIR 0.1 06/23/2014    IBILI 0.2 06/23/2014    LABALBU 3.0 01/22/2022         MICRO:  1/24 COVID NAAT neg   Resp culture ESBL E Coli; GS GPC   Escherichia coli (esbl)       BACTERIAL SUSCEPTIBILITY PANEL BY JAKE     ampicillin >=32 mcg/mL Resistant     ampicillin-sulbactam 4 mcg/mL Sensitive     ceFAZolin >=64 mcg/mL Resistant     cefepime  Resistant     cefTRIAXone >=64 mcg/mL Resistant     ciprofloxacin >=4 mcg/mL Resistant     ertapenem <=0.12 mcg/mL Sensitive     gentamicin <=1 mcg/mL Sensitive     levofloxacin >=8 mcg/mL Resistant     trimethoprim-sulfamethoxazole <=20 mcg/mL Sensitive          IMAGING:  CXR 1/27/22  Impression   Bilateral upper lobe opacification superimposed on chronic interstitial   changes concerning for pneumonia. Assessment:     Patient Active Problem List    Diagnosis Date Noted    S/P CABG x 4 04/02/2013     Priority: High     Overview Note:     Pre-operative Diagnosis: MV CAD, Paroxysmal afib, S/p NSTEMI   Procedure: CABG 4, mini Maze, Left atrial appendage ligation, Rib blocks, Platelet gel application             Coronary atherosclerosis of native coronary artery 03/30/2013     Priority: High     Overview Note:     3/13 Cath: Three vessel disease, needs CABG  4/13 Coronary artery bypass graft x4, Mini Maze, Left atrial appendage ligation, LIMA to the LAD, Saphenous vein graft to diagonal, everse saphenous vein graft to OM2,  Reverse saphenous vein graft to right coronary artery. 6/13 Toltzis:  Doing OK, increase the Valsartan to 80 mg daily  7/14 Toltzis:  Echo shows LVH, normal EF, RV 19 + JVP, stable DVT, short runs only, no medication changes, Amiodarone 200 mg daily and Metoprolol 100 mg daily        HTN (hypertension) 08/18/2010     Priority: High    Adrenal mass ([facility] [address] 05/22/2010     Priority: High     Overview Note:     6/05 ACT:  Adrenal adenoma noted on CT, 2.2 x 1 cm  11/07 ACT: no changes adrenal mass (actually not even seen! ! )      HYPERLIPIDEMIA 05/17/2010     Priority: High    Acute blood loss anemia 04/03/2013     Priority: Medium    Atrial fibrillation ([facility] [address] 05/12/2010     Priority: Medium     Overview Note:     5/10 Echo: Mod LA dilatation, mild MR and TR, normal LV  5/10 Toltzis:  Added Norvasc 5 qd for BP  6/09 Echo:   Moderate increase LA size, otherwise (-)  10/07 Stress Echo (-)  5/08 Atrial fib:  Cardioverted in the ER, no anticoagulation, heme positive stool  11/05 Event monitor:  Runs of PAT  10/05 Stress Echo (-)  8/04 palpitations:  Echo showed diastolic dysfunction, holter monitor positive for brief runs of SVT  7/12 Toltzis:  Episodes of atrial fib short lived, infrequent, continue same meds, Echo done today showed RVP 24, normal EF, moderate LA dilatation, mitral valve prolapse  8/13 Toltzis:  Restart Norvasc 5 mg daily  11/13 Toltzis:  Stop Xarelto      Sleep apnea 07/04/2012     Priority: Low     Overview Note:     8/12 Corser:  CPAP 5      Diplopia 06/10/2012     Priority: Low     Overview Note:     6/12 Golnik:  Has diplopia, will check ACHR antibody, but doubt Myasthenia Gravis, cause not felt to be serious  6/12 ACHR antibody (-)      Steatosis, liver 05/22/2010     Priority: Low     Overview Note:     3/07 Liver biopsy:  Fatty liver disease, no autoimmune hepatitis      Acute bronchitis 05/17/2010     Priority: Low    Smoking 05/17/2010     Priority: Low    MCALLISTER 05/17/2010     Priority: Low    Diverticulitis of colon 05/17/2010     Priority: Low     Overview Note:     9/05 ACT:  diverticuli on CT      Calculus of kidney 05/17/2010     Priority: Low     Overview Note:     6/05 Nephrolithiasis  11/07 ACT:  Bilateral renal cysts, 7 cm on the left and 3 cm on the right, no adrenal mass  6/05 24 hour urine collection (-)  8/10 ACT:  Bilateral renal cysts, otherwise (-)      Benign neoplasm of colon 05/17/2010     Priority: Low    Severe malnutrition ([facility] [address] 01/28/2022    Pneumonia of both lower lobes due to

## 2022-01-31 NOTE — PROGRESS NOTES
Occupational Therapy    Pt attempted to be seen this day at 0843. Pt declining therapy this day stating he was too anxious to participate and concerned with his breathing. Pt states he wants therapy at the [facility]. Pt educated on importance of therapy and OOB activity however pt continues to decline. OT will attempt at a later time as pt medically appropriate and agreeable.    Thank you,  Carmel Almazan, OTR/L

## 2022-02-01 PROBLEM — R53.81 DEBILITY: Status: ACTIVE | Noted: 2022-01-01

## 2022-02-01 NOTE — PROGRESS NOTES
Occupational Therapy  Facility/Department: [facility] C4 PCU  Daily Treatment Note  NAME: Nidia Blanchard  : 1945  MRN: [id number]    Date of Service: 2022    Discharge Recommendations:  Subacute/Skilled Nursing Facility       Assessment   Performance deficits / Impairments: Decreased functional mobility ; Decreased safe awareness;Decreased balance;Decreased ADL status; Decreased endurance;Decreased strength;Decreased high-level IADLs    Assessment: Pt is functioning below baseline for ADLs and mobility. Pt requiring increased assistance this date d/t weakness and fatigue after brief stand. Pt able to perform seated ADLS with set up. Pt demo's SOB and recovers with PLB and short rest breaks after OOB activity. Pt performing initial t/f with CGA and SW this date but on second attempt pt required max A with knees buckling. Cont OT in acute care. Prognosis: Good  OT Education: OT Role;Plan of Care;ADL Adaptive Strategies; Energy Conservation; Family Education  Patient Education: Disease specifics: role of OT, energy conservation, PLB, importance of OOB activity, general safety during hospitalization. --pt receptive to education  REQUIRES OT FOLLOW UP: Yes  Activity Tolerance  Activity Tolerance: Patient Tolerated treatment well  Activity Tolerance: /75, O2 97% on 3.5L, HR 83. O2 decreasing to 88% on 3L of O2 after stand but recovering twith rest and PLB. Safety Devices  Safety Devices in place: Yes  Type of devices: Left in bed;Call light within reach; Bed alarm in place;Nurse notified;Gait belt         Patient Diagnosis(es): The primary encounter diagnosis was Tachycardia. Diagnoses of Shortness of breath and Paroxysmal atrial fibrillation ([address] were also pertinent to this visit.       has a past medical history of Acute bronchitis, Adrenal adenoma, Atrial fibrillation ([address] Atrial fibrillation ([address] Benign neoplasm of colon, Calculus of kidney, Colonic polyps, Diverticulitis, Diverticulitis of colon (without mention of hemorrhage)(562.11), Fatty liver, Hyperlipidemia, Kidney stone, Other and unspecified hyperlipidemia, Other chronic nonalcoholic liver disease, Palpitations, Renal cyst, Steatosis, liver, and Tobacco use disorder. has a past surgical history that includes Colonoscopy (7/08); doppler echocardiography (6/09); cardiovascular stress test (10/07, 10/05); liver biopsy (3/07); Kidney stone surgery (6/05); bronchoscopy (N/A, 1/24/2022); bronchoscopy (1/24/2022); and IR MIDLINE CATH (1/28/2022). Restrictions  Restrictions/Precautions  Restrictions/Precautions: Fall Risk,Up as Tolerated,General Precautions  Position Activity Restriction  Other position/activity restrictions: 3.5L of O2, telemetry with continuous pulse ox     Subjective   General  Chart Reviewed: Yes,Orders,Progress Notes,History and Physical  Patient assessed for rehabilitation services?: Yes  Response to previous treatment: Patient with no complaints from previous session  Family / Caregiver Present: No  Referring Practitioner: Yanet Arevalo MD 1/24/22  Diagnosis: SOB, atrial fibrillation  Subjective  Subjective: Pt agreeable to OT  General Comment  Comments: RN approved therapy  Pain Assessment  Patient's Stated Pain Goal: 5  Pain Type: Acute pain  Pain Location: Head  Pain Descriptors: Headache  Non-Pharmaceutical Pain Intervention(s): Ambulation/Increased Activity;Repositioned; Rest  Response to Pain Intervention: Patient Satisfied  Pre Treatment Pain Screening  Intervention List: Patient able to continue with treatment  Vital Signs  Temp: 97.5 °F (36.4 °C)  Pulse: 83  Heart Rate Source: Monitor  BP: 135/75  BP Location: Left upper arm  Patient Position: Semi fowlers  Patient Currently in Pain: Yes  Oxygen Therapy  SpO2: 97 %  Pulse Oximeter Device Mode: Intermittent  Pulse Oximeter Device Location: Finger  O2 Device: Nasal cannula  O2 Flow Rate (L/min): 3.5 L/min   Orientation  Orientation  Overall Orientation Status: Within Functional Limits  Objective    ADL  Grooming: Setup (Pt brushing teeth and washing face seated EOB.)  LE Dressing: Dependent/Total (to don socks.)        Balance  Sitting Balance: Maximum assistance (primarily supervision, however after second standing attempt, pt required max A for safety to prevent pt from sliding out of bed.)  Standing Balance: Maximum assistance (Pt CGA first stand with SW. Second stand, pt's knees buckling trequiring max A to safely return to seated.)  Standing Balance  Time: ~40s  Activity: standing at EOB for julius pad change. Bed mobility  Rolling to Left: Minimal assistance  Rolling to Right: Minimal assistance  Supine to Sit: Stand by assistance (HOB elevated.)  Sit to Supine: Minimal assistance  Scootin Person assistance;Dependent/Total  Comment: vc's for sequencing. Transfers  Sit to stand: Maximum assistance  Stand to sit: Maximum assistance  Transfer Comments: first stand with CGA and SW, second stand max A with knees buckling with SW. vc's for hand placement. Cognition  Overall Cognitive Status: [facility]  Cognition Comment: Pt with increased awareness of deficits and experiencing anxiety over deficits. Pt educated on steps to maintain safe progression in hospital. Pt calming some. Positioning  Bed Positioning Type: Lower extremity  Bed Postion Comment: B/L LE elevated on pillows. Type of ROM/Therapeutic Exercise  Type of ROM/Therapeutic Exercise: AROM  Comment: seated  Exercises  Other: x10 ankle pumps.                     Plan   Plan  Times per week: 3-5x's a week while in acute care    AM-PAC Score        AM-[facility] Inpatient Daily Activity Raw Score: 15 (22 154)  AM-PAC Inpatient ADL T-Scale Score : 34.69 (22 154)  ADL Inpatient CMS 0-100% Score: 56.46 (22 154)  ADL Inpatient CMS G-Code Modifier : CK (22)    Goals  Short term goals  Time Frame for Short term goals: 1 week unless otherwise specificed   Short term goal 1: Pt will complete LE dressing with Valentina-ongoing, total A (2/1)  Short term goal 2: Pt will complete toilet transfer with Valentina-ongoing,ALYSHA 2/1  Short term goal 3: Pt will complete standing level ADLs with CGA for balance-ongoing 2/1 CGA-max A for standing balance but no ADLS completed. Short term goal 4: Pt will tolerated 2 mins of standing level activity before requiring seated rest break.-ongoing 2/1 pt standing for less than 2 min  Short term goal 5: Pt will complete 10 reps of BUE AROM exercises to increase strength for ADLs/transfers by 1/29-ongoing for consistency 1/26  Patient Goals   Patient goals : \"to get better \"       Therapy Time   Individual Concurrent Group Co-treatment   Time In 0856         Time Out 0940         Minutes 44         Timed Code Treatment Minutes: 5838 [facility], OTR/L  If pt discharges prior to next session, this note will serve as discharge summary. See case management note for discharge disposition.

## 2022-02-01 NOTE — PROGRESS NOTES
INPATIENT PULMONARY CRITICAL CARE PROGRESS NOTE      Reason for visit    Bronchiectasis/hypoxemia    SUBJECTIVE: Patient seen this morning continues to have some shortness of breath, patient still has some hypoxemia on ambulation, patient states that he is able to bring up some phlegm which is somewhat thick but mucoid in nature, patient was not having any chest pain, patient shortness of breath is improving, patient was afebrile and hemodynamically maintained, patient continues to be in atrial fibrillation with controlled ventricular rate on the monitor, patient was on 5 L of nasal cannula oxygen with saturation of 95% at rest, patient has a concentrator which goes up to 5 L at home only, patient has had borderline urine output with cumulative fluid balance of -2.8 L, patient's p.o. intake is limited, patient's glycemic control was acceptable no other pertinent review of system of concern        Physical Exam:  Blood pressure 122/67, pulse 85, temperature 97.7 °F (36.5 °C), temperature source Oral, resp. rate 20, height 5' 8\" (1.727 m), weight 177 lb 11.1 oz (80.6 kg), SpO2 95 %.'     Constitutional:   No respiratory distress   HENT:  Oropharynx is clear and moist. No thyromegaly. Eyes:  Conjunctivae are normal. Pupils equal, round, and reactive to light. No scleral icterus. pallor   Neck: . No tracheal deviation present. No obvious thyroid mass. Cardiovascular: s1s2 irregularly irregular , normal heart sounds. No right ventricular heave. No lower extremity edema. Pulmonary/Chest: No wheezes. Decreased B/L  Rales no significantchest congestion   Chest wall is not dull to percussion. No accessory muscle usage or stridor. Decreased BSI with prolonged expiration   Abdominal: Soft. Bowel sounds present. No distension or hernia. No tenderness. Musculoskeletal: No cyanosis. No clubbing. No obvious joint deformity. Lymphadenopathy: No cervical or supraclavicular adenopathy. Skin: Skin is warm and dry.  No rash or nodules on the exposed extremities. Psychiatric: Normal mood and affect. Behavior is normal.  No anxiety. Neurologic: Alert, awake and oriented. PERRL. Speech fluent       Results:  CBC:   Recent Labs     01/29/22  0511 01/30/22  0514 01/31/22  0520   WBC 11.0 9.6 10.5   HGB 7.9* 8.0* 8.2*   HCT 25.4* 25.0* 25.9*   MCV 91.7 91.1 91.2    352 399     BMP:   Recent Labs     01/29/22  0511 01/30/22  0514 01/31/22  0520    137 140   K 4.7 5.0 5.6*    102 101   CO2 31 33* 28   BUN 30* 35* 36*   CREATININE <0.5* 0.6* <0.5*       Imaging:  I have reviewed radiology images personally. IR MIDLINE CATH   Final Result      XR CHEST PORTABLE   Final Result   Bilateral upper lobe opacification superimposed on chronic interstitial   changes concerning for pneumonia. XR CHEST PORTABLE   Final Result   Chronic appearing reticulonodular opacities. Echo Complete    Result Date: 1/24/2022  Transthoracic Echocardiography Report (TTE)  Demographics   Patient Name       Shannan Gomez   Date of Study      01/24/2022        Gender              Male   Patient Number     [id number]        Date of Birth       1945   Visit Number       [id number]         Age                 67 year(s)   Accession Number   [id number]        Room Number         7966   Corporate ID       [id number]          Sonographer         Dawit Waller, [facility]   Ordering Physician Jaquelin Ma, CNP  Interpreting        [address] [facility]           Jhonatan Anton MD, [facility]  Procedure Type of Study   TTE procedure:ECHOCARDIOGRAM COMPLETE 2D W DOPPLER W COLOR. Procedure Date Date: 01/24/2022 Start: 01:48 PM Study Location: [address] [place] [address] Amanda Huff DBA [employer] Quality: Limited visualization due to lung interface. Indications:Atrial fibrillation.  Patient Status: Routine Height: 68 inches Weight: 180 pounds BSA: 1.95 m2 BMI: 27.37 kg/m2 BP: 112/63 mmHg  Conclusions   Summary  Limited visualization due to lung interference. Left ventricular systolic function is difficult to estimate but appears to  be grossly normal with an estimated ejection fraction of 55%. The left ventricle is normal in size with normal wall thickness. Flattened in diastole (\"D-shaped septum\") consistent with right ventricular  volume overload. Elevated left atrial pressures with a septal E/e' ratio of 20.9. The right ventricle is not well visualized but appears dilated in size. Right ventricular systolic function is reduced. Moderate biatrial enlargement. Mild mitral and tricuspid regurgitation. Systolic pulmonary artery pressure (SPAP) is normal and estimated at 28 mmHg  (right atrial pressure 3 mmHg). Signature   ------------------------------------------------------------------  Electronically signed by Laurie Krause MD, [facility] [employer]  (Interpreting physician) on 01/24/2022 at 02:46 PM  ------------------------------------------------------------------   Findings   Left Ventricle  Left ventricular systolic function is difficult to estimate but appears to  be grossly normal with an estimated ejection fraction of 55%. The left ventricle is normal in size with normal wall thickness. Flattened in diastole (\"D-shaped septum\") consistent with right ventricular  volume overload. Elevated left atrial pressures with a septal E/e' ratio of 20.9. Mitral Valve  The mitral valve is normal in structure. Mild mitral regurgitation. Left Atrium  The left atrium appears moderately enlarged. Aortic Valve  The aortic valve appears normal in structure and function. There is no evidence of aortic regurgitation. Aorta  The aortic root is normal in size. Right Ventricle  The right ventricle is not well visualized but appears dilated in size. Right ventricular systolic function is reduced. TAPSE is measured at 8 mm. S velocity is measured at 4.64 cm/s. Tricuspid Valve  The tricuspid valve is normal in structure. Mild tricuspid regurgitation. Systolic pulmonary artery pressure (SPAP) is normal and estimated at 28 mmHg  (right atrial pressure 3 mmHg). Right Atrium  The right atrium is moderately enlarged. Right atrial area is 26.6 cm2. Right atrial volume is 44.4 ml/m2. Pulmonic Valve  The pulmonic valve leaflets are not well visualized. No evidence of pulmonic regurgitation. Pericardial Effusion  No pericardial effusion noted. Pleural Effusion  No pleural effusion noted. Miscellaneous  No obvious masses, thrombi, or vegetations are noted. IVC is normal in size (<2.1 cm) and collapses > 50% with respiration  consistent with normal right atrial pressure (3 mmHg). M-Mode/2D Measurements (cm)   LV Diastolic Dimension: 9.17 cm LV Systolic Dimension: 8.75 cm  LV Septum Diastolic: 5.92 cm  LV PW Diastolic: 8.37 cm        AO Root Dimension: 3.6 cm                                  AV Cusp Separation: 2.4 cm                                  LA Dimension: 4.8 cm  LVOT: 2.2 cm                    LA Area: 25.8 cm2                                  LA volume/Index: 89 ml /46 ml/m2  Doppler Measurements   AV Peak Velocity: 95 cm/s     MV Peak E-Wave: 89.1 cm/s  AV Peak Gradient: 3.61 mmHg  LVOT Peak Velocity: 67.2 cm/s   TR Velocity:252 cm/s  TR Gradient:25.4 mmHg  Estimated RAP:3 mmHg  Estimated RVSP: 28 mmHg  E' Septal Velocity: 5.96 cm/s  E/E' ratio: 20.9   Aortic Valve   Peak Velocity: 95 cm/s  Peak Gradient: 3.61 mmHg   Cusp Separation: 2.4 cm  Aorta   Aortic Root: 3.6 cm  LVOT Diameter: 2.2 cm      XR CHEST PORTABLE    Result Date: 1/22/2022  EXAMINATION: ONE XRAY VIEW OF THE CHEST 1/22/2022 5:16 pm COMPARISON: 09/14/2020 HISTORY: ORDERING SYSTEM PROVIDED HISTORY: cough TECHNOLOGIST PROVIDED HISTORY: Reason for exam:->cough Reason for Exam: Shortness of Breath (pt to ED with c/o SOB and weakness since November. pt denies any chest pain reports some cough.  on 6 L O2 at home at baseline) FINDINGS: The cardiomediastinal silhouette is unchanged in appearance. Status post median sternotomy. Bilateral reticulonodular opacities are without significant change compared to the exam in 2020. Blunting of the costophrenic angles also appears unchanged. No pneumothorax. The osseous structures are unchanged in appearance. Chronic appearing reticulonodular opacities. 50,000 to 100,000 CFU/mL   CONTACT PRECAUTIONS INDICATED   Carbapenem antibiotics are the best option for infections caused   by ESBL producing organisms.  Other antibiotic classes are   likely to result in treatment failure, even for organisms   demonstrating in vitro susceptibility.    Abnormal     *Additional information available - narrative            Contains abnormal data Culture, Respiratory  Order: [id number]   Status: Edited Result - FINAL     Visible to patient: No (not released)     Next appt: None    Specimen Information: BAL Quantitative Count         0 Result Notes    Component 1/24/22 1043 Resulting Agency   CULTURE, RESPIRATORY Normal respiratory daniel absent Abnormal   [facility]   Gram Stain Result 3+ WBC's (Polymorphonuclear) present   1+ Gram positive cocci  in pairs- resembling Strep  [facility]   Organism Escherichia coli ESBL Abnormal   [facility]   CULTURE, RESPIRATORY  Abnormal   50,000 to 100,000 CFU/mL   CONTACT PRECAUTIONS INDICATED   Carbapenem antibiotics are the best option for infections caused   by ESBL producing organisms.  Other antibiotic classes are   likely to result in treatment failure, even for organisms   demonstrating in vitro susceptibility. Lung, Right Lower Lobe, Bronchoalveolar Lavage:      - Negative for malignancy.      - Severe acute inflammation present.      - PAS-fungus stain negative for fungal forms and Pneumocystis        jirovecii.        Assessment:  Principal Problem:    Atrial fibrillation [facility])  Active Problems:    HTN (hypertension)    S/P CABG x 4    Lung nodules    Pneumonia of both lower lobes due to Escherichia coli (HCC)    Severe malnutrition ([address]  Resolved Problems:    * No resolved hospital problems.  *          Plan:   · Oxygen supplementation to keep saturation between 90 and 94% only  · Please titrate oxygen as per the above parameters  · Case discussed with case management about getting the patient a concentrator which was up to 10 L/min at home  · Pulmonary toilet  · Status post bronchoscopy which shows patient to have ESBL E. coli pneumonia  · Continue IV meropenem  · Patient is on Symbicort which can be continued  · On as needed basis  · Patient is on IV Solu-Medrol  · Cardiac medications as per IM/cardiology   · May need speech evaluation   · PUD prophylaxis as per IM  · PT/OT     Case discussed with patient and case management         Electronically signed by:  Fartun Jackson MD    1/31/2022    7:53 PM.

## 2022-02-01 NOTE — PROGRESS NOTES
Comprehensive Nutrition Assessment    Type and Reason for Visit:  Reassess    Nutrition Recommendations/Plan:   1. Continue regular diet  2. Encourage nutrition  3. Continue Magic Cup ONS  4. Monitor po intakes, nutrition adequacy, weights, pertinent labs, BMs    Nutrition Assessment:  Follow up: Pt improving from a nutritional standpoint AEB pt report of appetite improving. Currently on a regular diet with Magic Cup ONS. Po intakes % generally, 26-50% sometimes. Pt reports that he has been eating all of his Magic Cup ONS. Pt denied having any nutrition questions or needs. Malnutrition Assessment:  Malnutrition Status:  Severe malnutrition    Context:  Chronic Illness     Findings of the 6 clinical characteristics of malnutrition:  Energy Intake:  7 - 75% or less estimated energy requirements for 1 month or longer  Weight Loss:  Unable to assess     Body Fat Loss:  7 - Severe body fat loss Triceps   Muscle Mass Loss:  7 - Severe muscle mass loss Clavicles (pectoralis & deltoids)  Fluid Accumulation:  1 - Mild     Strength:  Not Performed    Estimated Daily Nutrient Needs:  Energy (kcal):  3636-2888 kcals/day; Weight Used for Energy Requirements:  Current (77kg)     Protein (g):  77-92 g/day; Weight Used for Protein Requirements:  Current (1-1.2)        Fluid (ml/day):   ; Method Used for Fluid Requirements:  1 ml/kcal      Nutrition Related Findings:  +BM on 2/1      Wounds:  None       Current Nutrition Therapies:    ADULT DIET; Regular  ADULT ORAL NUTRITION SUPPLEMENT; Lunch, Dinner; Frozen Oral Supplement    Anthropometric Measures:  · Height: 5' 8\" (172.7 cm)  · Current Body Weight: 177 lb 11.1 oz (80.6 kg)   · Ideal Body Weight: 154 lbs; % Ideal Body Weight 110.4 %   · BMI: 27  · BMI Categories: Overweight (BMI 25.0-29. 9)       Nutrition Diagnosis:   · Severe malnutrition related to inadequate protein-energy intake as evidenced by poor intake prior to admission,intake 0-25%,intake 26-50%,severe loss of subcutaneous fat,severe muscle loss    Nutrition Interventions:   Food and/or Nutrient Delivery:  Continue Current Diet,Continue Oral Nutrition Supplement  Nutrition Education/Counseling:  No recommendation at this time   Coordination of Nutrition Care:  Continue to monitor while inpatient    Goals:  Pt will consume greater than 50% of meals and ONS w/o further weight loss       Nutrition Monitoring and Evaluation:   Behavioral-Environmental Outcomes:  None Identified   Food/Nutrient Intake Outcomes:  Food and Nutrient Intake,Supplement Intake  Physical Signs/Symptoms Outcomes:  Weight,Nutrition Focused Physical Findings,Biochemical Data     Discharge Planning:    Continue current diet,Continue Oral Nutrition Supplement     Electronically signed by Alexandria Pandya RD, LD on 2/1/22 at 12:37 PM EST    Contact: [phone]

## 2022-02-01 NOTE — CARE COORDINATION
Hospital day 10: Patient on C4 re Afib followed by IM, ID, and Pulmonology. Patient with SNF recs plan to [facility] with [facility] ATB. SW will ct to follow for DCP needs. TIERA Dinero

## 2022-02-01 NOTE — PROGRESS NOTES
Hospitalist Progress Note      PCP: Junior Reed MD    Date of Admission: 1/22/2022    Chief Complaint: Tachycardia/SOB    Subjective: no new c/o. Medications:  Reviewed    Infusion Medications    sodium chloride 25 mL (01/30/22 0011)     Scheduled Medications    guaiFENesin  600 mg Oral BID    methylPREDNISolone  40 mg IntraVENous Q12H    dilTIAZem  120 mg Oral BID    meropenem  1,000 mg IntraVENous Q8H    melatonin  5 mg Oral Nightly    enoxaparin  40 mg SubCUTAneous Daily    aspirin  81 mg Oral Daily    atorvastatin  40 mg Oral Daily    ferrous sulfate  325 mg Oral BID    FLUoxetine  10 mg Oral Daily    metoprolol tartrate  50 mg Oral BID    budesonide-formoterol  1 puff Inhalation BID    sodium chloride flush  5-40 mL IntraVENous 2 times per day     PRN Meds: hydrOXYzine, sodium chloride, albuterol, diphenhydrAMINE, sodium chloride flush, sodium chloride, ondansetron **OR** ondansetron, polyethylene glycol, acetaminophen **OR** acetaminophen, perflutren lipid microspheres      Intake/Output Summary (Last 24 hours) at 2/1/2022 0911  Last data filed at 1/31/2022 2345  Gross per 24 hour   Intake 540 ml   Output 1090 ml   Net -550 ml       Physical Exam Performed:    /75   Pulse 83   Temp 97.5 °F (36.4 °C)   Resp 22   Ht 5' 8\" (1.727 m)   Wt 177 lb 11.1 oz (80.6 kg)   SpO2 97%   BMI 27.02 kg/m²     General appearance: No apparent distress, appears stated age and cooperative. HEENT: Pupils equal, round, and reactive to light. Conjunctivae/corneas clear. Neck: Supple, with full range of motion. No jugular venous distention. Trachea midline. Respiratory:  Normal respiratory effort. Clear to auscultation, bilaterally without Rales/Wheezes/Rhonchi. Cardiovascular: Regular rate and rhythm with normal S1/S2 without murmurs, rubs or gallops. Abdomen: Soft, non-tender, non-distended with normal bowel sounds. Musculoskeletal: No clubbing, cyanosis or edema bilaterally.   Full range of motion without deformity. Skin: Skin color, texture, turgor normal.  No rashes or lesions. Neurologic:  Neurovascularly intact without any focal sensory/motor deficits. Cranial nerves: II-XII intact, grossly non-focal.  Psychiatric: Alert and oriented, thought content appropriate, normal insight  Capillary Refill: Brisk,< 3 seconds   Peripheral Pulses: +2 palpable, equal bilaterally       Labs:   Recent Labs     01/30/22  0514 01/31/22  0520 02/01/22  0529   WBC 9.6 10.5 9.6   HGB 8.0* 8.2* 8.5*   HCT 25.0* 25.9* 26.9*    399 404     Recent Labs     01/30/22  0514 01/31/22  0520 02/01/22  0529    140 138   K 5.0 5.6* 5.6*    101 101   CO2 33* 28 33*   BUN 35* 36* 35*   CREATININE 0.6* <0.5* 0.6*   CALCIUM 9.2 8.9 9.1     No results for input(s): AST, ALT, BILIDIR, BILITOT, ALKPHOS in the last 72 hours. No results for input(s): INR in the last 72 hours. No results for input(s): Conde Bacilio in the last 72 hours.     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  Normally rate controlled on Amiodarone/BBlocker - Amio d/c'd and BBlocker continued w/ CCBlocker added per Cardiology consult - appreciated. Anticoagulated at baseline on home Xarelto - held w/ hx GI Bleed. COPD - w/chronic respiratory failure on baseline home O2 at 3L. Normally controlled on home medication regimen - continued. Here w/ acute exacerbation - on IV Solumedrol. Acute Respiratory Failure - w/ hypoxia, POArrival.  Presence of clinical respiratory distress w/ tachypnea/dyspnea/SOB and wheezing w/ use of accessory muscles to breath. Supplemental O2 and wean as tolerated. Pulmonology consulted and appreciated s/p Bronchoscopy 24 Jan.  BAL grew E. Coli ESBL / MDRO; cytology negative for malignancy, fungal forms or PCP. PNA - gram negative as above. Infectious Disease consulted and appreciated on IV Merrem to continue through 4 Feb.     - continue inhaled bronchodilator therapy      HTN/CAD - w/ known CAD s/p prior CABG, but no evidence of active signs/sxs of ischemia/failure. Currently controlled on home meds w/ vitals reviewed and documented as above. HyperLipidemia - controlled on home Statin. Continue, w/ f/u and med adjustment w/ PCP    Anemia - etiology clinically unable to determine, w/out evidence of active bleeding/hemolysis. Asymptomatic w/out indication for transfusion. Follow serial labs. Reviewed and documented as above.      DVT Prophylaxis: LMWH     Recent Labs     01/30/22  0514 01/31/22  0520 02/01/22  0529    399 404     Diet: ADULT DIET; Regular  ADULT ORAL NUTRITION SUPPLEMENT; Lunch, Dinner; Frozen Oral Supplement  Code Status: Full Code      PT/OT Eval Status: seen w/ recs for SNF. Dispo - Patient is likely to remain in-house at least until Wed/Thurs 2/3 Feb pending clinical course, subspecialist recs and placement decision.        Sharath Nunn MD

## 2022-02-01 NOTE — PROGRESS NOTES
INPATIENT PULMONARY CRITICAL CARE PROGRESS NOTE      Reason for visit    Bronchiectasis/hypoxemia    SUBJECTIVE: Patient seen this morning does not have increasing cough/expectoration/sob/wheezing/chest pain , patient was not having any chest pain, patient shortness of breath is improving, patient was afebrile and hemodynamically maintained, patient continues to be in atrial fibrillation with controlled ventricular rate on the monitor, patient was on 3 L of nasal cannula oxygen with saturation of 96% at rest, patient is quite weak and needs lot of assist to get up as per nursing  patient has had borderline urine output with cumulative fluid balance of -3.3 L, patient's p.o. intake is limited, patient's glycemic control was acceptable no other pertinent review of system of concern        Physical Exam:  Blood pressure 129/77, pulse 88, temperature 97.8 °F (36.6 °C), temperature source Oral, resp. rate 20, height 5' 8\" (1.727 m), weight 177 lb 11.1 oz (80.6 kg), SpO2 96 %.'     Constitutional:   No respiratory distress   HENT:  Oropharynx is clear and moist. No thyromegaly. Eyes:  Conjunctivae are normal. Pupils equal, round, and reactive to light. No scleral icterus. pallor   Neck: . No tracheal deviation present. No obvious thyroid mass. Cardiovascular: s1s2 irregularly irregular , normal heart sounds. No right ventricular heave. No lower extremity edema. Pulmonary/Chest: No wheezes. Decreased B/L  Rales no significantchest congestion   Chest wall is not dull to percussion. No accessory muscle usage or stridor. Decreased BSI with prolonged expiration   Abdominal: Soft. Bowel sounds present. No distension or hernia. No tenderness. Musculoskeletal: No cyanosis. No clubbing. No obvious joint deformity. Lymphadenopathy: No cervical or supraclavicular adenopathy. Skin: Skin is warm and dry. No rash or nodules on the exposed extremities. Psychiatric: Normal mood and affect.  Behavior is normal.  No anxiety. Neurologic: Alert, awake and oriented. PERRL. Speech fluent       Results:  CBC:   Recent Labs     01/30/22  0514 01/31/22  0520 02/01/22 0529   WBC 9.6 10.5 9.6   HGB 8.0* 8.2* 8.5*   HCT 25.0* 25.9* 26.9*   MCV 91.1 91.2 91.5    399 404     BMP:   Recent Labs     01/30/22  0514 01/31/22  0520 02/01/22 0529    140 138   K 5.0 5.6* 5.6*    101 101   CO2 33* 28 33*   BUN 35* 36* 35*   CREATININE 0.6* <0.5* 0.6*       Imaging:  I have reviewed radiology images personally. IR MIDLINE CATH   Final Result      XR CHEST PORTABLE   Final Result   Bilateral upper lobe opacification superimposed on chronic interstitial   changes concerning for pneumonia. XR CHEST PORTABLE   Final Result   Chronic appearing reticulonodular opacities.                 50,000 to 100,000 CFU/mL   CONTACT PRECAUTIONS INDICATED   Carbapenem antibiotics are the best option for infections caused   by ESBL producing organisms.  Other antibiotic classes are   likely to result in treatment failure, even for organisms   demonstrating in vitro susceptibility.    Abnormal     *Additional information available - narrative            Contains abnormal data Culture, Respiratory  Order: [id number]   Status: Edited Result - FINAL     Visible to patient: No (not released)     Next appt: None    Specimen Information: BAL Quantitative Count         0 Result Notes    Component 1/24/22 1043 Resulting Agency   CULTURE, RESPIRATORY Normal respiratory daniel absent Abnormal   [facility]   Gram Stain Result 3+ WBC's (Polymorphonuclear) present   1+ Gram positive cocci  in pairs- resembling Strep  [facility]   Organism Escherichia coli ESBL Abnormal   [address] [facility]   CULTURE, RESPIRATORY  Abnormal   50,000 to 100,000 CFU/mL   CONTACT PRECAUTIONS INDICATED   Carbapenem antibiotics are the best option for infections caused   by ESBL producing organisms.  Other antibiotic classes are   likely to result in treatment failure, even for organisms   demonstrating in vitro susceptibility. Lung, Right Lower Lobe, Bronchoalveolar Lavage:      - Negative for malignancy.      - Severe acute inflammation present.      - PAS-fungus stain negative for fungal forms and Pneumocystis        jirovecii. Assessment:  Principal Problem:    Atrial fibrillation (HCC)  Active Problems:    HTN (hypertension)    S/P CABG x 4    Lung nodules    Pneumonia of both lower lobes due to Escherichia coli (HCC)    Severe malnutrition ([address]    Debility  Resolved Problems:    * No resolved hospital problems.  *          Plan:   · Oxygen supplementation to keep saturation between 90 and 94% only  · Please titrate oxygen as per the above parameters  · Patient now on 3 lts/min of nasal cannula oxygen when seen this morning   · Pulmonary toilet  · Status post bronchoscopy which shows patient to have ESBL E. coli pneumonia  · Continue IV meropenem for total of 10-14 days   · Patient is on Symbicort which can be continued  · On as needed basis  · Patient is on IV Solu-Medrol  · Cardiac medications as per IM/cardiology   · Oral diet and supplements as per speech therapy/metabolic support  · PUD prophylaxis as per IM  · PT/OT-may need SNF      Case discussed with patient and nursing          Electronically signed by:  Mere Crawley MD    2/1/2022    2:12 PM.

## 2022-02-01 NOTE — PROGRESS NOTES
Physical Therapy  Facility/Department: [facility]  Daily Treatment Note  NAME: Jyoti Baer  : 1945  MRN: [id number]    Date of Service: 2022    Discharge Recommendations:  Subacute/Skilled Nursing Facility   PT Equipment Recommendations  Equipment Needed: No  Other: TBD at SNF    Assessment   Body structures, Functions, Activity limitations: Decreased functional mobility ; Decreased strength;Decreased endurance;Decreased balance;Decreased posture  Assessment: Pt requiring Valentina x 1 for sit<>stand transfers and able to transfer bed>chair while amb 4' min A rw. Recommend SNF at D/C in light of current deficits. Treatment Diagnosis: Decreased endurance and (I) with mobility  Prognosis: Good  Decision Making: Medium Complexity  PT Education: Goals; General Safety;PT Role;Plan of Care;Family Education;Disease Specific Education; Functional Mobility Training;Equipment;Precautions;Transfer Training;Energy Conservation  Patient Education: Disease-specific education: Pt educated on role of PT in hospital and pursed-lip breathing techniques during rest breaks; pt verbalizes understanding. REQUIRES PT FOLLOW UP: Yes  Activity Tolerance  Activity Tolerance: Patient Tolerated treatment well  Activity Tolerance: 129/77  HR  80  O2 92% 2 L spo2     Patient Diagnosis(es): The primary encounter diagnosis was Tachycardia. Diagnoses of Shortness of breath and Paroxysmal atrial fibrillation ([address] were also pertinent to this visit. has a past medical history of Acute bronchitis, Adrenal adenoma, Atrial fibrillation ([address] Atrial fibrillation ([address] Benign neoplasm of colon, Calculus of kidney, Colonic polyps, Diverticulitis, Diverticulitis of colon (without mention of hemorrhage)(562.11), Fatty liver, Hyperlipidemia, Kidney stone, Other and unspecified hyperlipidemia, Other chronic nonalcoholic liver disease, Palpitations, Renal cyst, Steatosis, liver, and Tobacco use disorder.    has a past surgical history that includes Colonoscopy (7/08); doppler echocardiography (6/09); cardiovascular stress test (10/07, 10/05); liver biopsy (3/07); Kidney stone surgery (6/05); bronchoscopy (N/A, 1/24/2022); bronchoscopy (1/24/2022); and IR MIDLINE CATH (1/28/2022).     Restrictions  Restrictions/Precautions  Restrictions/Precautions: Fall Risk,Up as Tolerated,General Precautions  Position Activity Restriction  Other position/activity restrictions: 3.5L of O2, telemetry with continuous pulse ox  Subjective   General  Chart Reviewed: Yes  Family / Caregiver Present: No (neise left start of session)  Referring Practitioner: Dr. Paige Eagle  Subjective  Subjective: Pt agreeable to work with PT this afternoon-wants to use the toilet  General Comment  Comments: Pt resting in bed upon entry of PT and cleared by RN for session  Pain Screening  Patient Currently in Pain: No  Vital Signs  Patient Currently in Pain: No       Orientation  Orientation  Overall Orientation Status: Within Normal Limits  Cognition      Objective   Bed mobility  Supine to Sit: Stand by assistance (HOB elev)  Transfers  Sit to Stand: Minimal Assistance  Stand to sit: Minimal Assistance  Bed to Chair: Dependent/Total (to toilet via s stedy d/t pt c/o fatigue and sense of bowel urgency)  Ambulation  Ambulation?: No     Balance  Posture: Fair  Sitting - Static: Good  Sitting - Dynamic: Good;-  Standing - Static: Fair;+  Standing - Dynamic: Fair (AD)  Exercises  Heelslides: 10 B  Hip Abduction: 10 B  Knee Long Arc Quad: 10 B  Ankle Pumps: 10 B         Comment: toilet transfers mod A s stedy         AM-PAC Score  AM-PAC Inpatient Mobility Raw Score : 15 (02/01/22 1432)  AM-PAC Inpatient T-Scale Score : 39.45 (02/01/22 1432)  Mobility Inpatient CMS 0-100% Score: 57.7 (02/01/22 1432)  Mobility Inpatient CMS G-Code Modifier : CK (02/01/22 1432)          Goals  Short term goals  Time Frame for Short term goals: 1 week, 1/31/22 (unless otherwise specified)  Short term goal 1: Pt will transfer supine <-> sit with supervision  -2/01 cga  Short term goal 2: Pt will transfer sit <-> stand and bed>chair using RW or least AD with supervision   -1/27 min A rw    -2/01  NT  Short term goal 3: Pt will ambulate x 60 feet using RW or least AD with SBA   -1/27 min A rw 4'   -2/01  NT  Short term goal 4: By 1/26/22: Pt will tolerate 12-15 reps BLE exercise for strengthening, balance, and endurance   -1/27 initiated  Patient Goals   Patient goals : \"To get back to my normal activities at home\"    Plan    Plan  Times per week: 3-5x/week in acute care  Times per day: Daily  Specific instructions for Next Treatment: Progress ther ex and mobility as tolerated  Current Treatment Recommendations: Strengthening,Balance Training,Endurance Training,Functional Mobility Training,Transfer Training,Gait Training,Home Exercise Program,Safety Education & Training,Patient/Caregiver Education & Training,Equipment Evaluation, Education, & procurement  Safety Devices  Type of devices:  All fall risk precautions in place,Nurse notified,Gait belt,Patient at risk for falls,Call light within reach,Chair alarm in place,Bed alarm in place,Left in bed     Therapy Time   Individual Concurrent Group Co-treatment   Time In 1355         Time Out 1435         Minutes 40         Timed Code Treatment Minutes: Libby Cuellar

## 2022-02-01 NOTE — PROGRESS NOTES
Physical Therapy  Facility/Department: [facility] C4 PCU  Daily Treatment Note  NAME: Nidia Blanchard  : 1945  MRN: [id number]    Date of Service: 2022    Discharge Recommendations:  Subacute/Skilled Nursing Facility   PT Equipment Recommendations  Equipment Needed: No  Other: TBD at SNF    Assessment   Body structures, Functions, Activity limitations: Decreased functional mobility ; Decreased strength;Decreased endurance;Decreased balance;Decreased posture  Assessment: Pt requiring Valentina x 1 for sit<>stand transfers and able to transfer bed>chair while amb 4' min A rw. Recommend SNF at D/C in light of current deficits. Treatment Diagnosis: Decreased endurance and (I) with mobility  Prognosis: Good  Decision Making: Medium Complexity  PT Education: Goals; General Safety;PT Role;Plan of Care;Family Education;Disease Specific Education; Functional Mobility Training;Equipment;Precautions;Transfer Training;Energy Conservation  Patient Education: Disease-specific education: Pt educated on role of PT in hospital and pursed-lip breathing techniques during rest breaks; pt verbalizes understanding. REQUIRES PT FOLLOW UP: Yes  Activity Tolerance  Activity Tolerance: Patient Tolerated treatment well  Activity Tolerance: 129/77  HR  80  O2 92% 2 L spo2     Patient Diagnosis(es): The primary encounter diagnosis was Tachycardia. Diagnoses of Shortness of breath and Paroxysmal atrial fibrillation ([address] were also pertinent to this visit. has a past medical history of Acute bronchitis, Adrenal adenoma, Atrial fibrillation ([address] Atrial fibrillation ([address] Benign neoplasm of colon, Calculus of kidney, Colonic polyps, Diverticulitis, Diverticulitis of colon (without mention of hemorrhage)(562.11), Fatty liver, Hyperlipidemia, Kidney stone, Other and unspecified hyperlipidemia, Other chronic nonalcoholic liver disease, Palpitations, Renal cyst, Steatosis, liver, and Tobacco use disorder.    has a past surgical history that includes Colonoscopy (7/08); doppler echocardiography (6/09); cardiovascular stress test (10/07, 10/05); liver biopsy (3/07); Kidney stone surgery (6/05); bronchoscopy (N/A, 1/24/2022); bronchoscopy (1/24/2022); and IR MIDLINE CATH (1/28/2022).     Restrictions  Restrictions/Precautions  Restrictions/Precautions: Fall Risk,Up as Tolerated,General Precautions  Position Activity Restriction  Other position/activity restrictions: 3.5L of O2, telemetry with continuous pulse ox  Subjective   General  Chart Reviewed: Yes  Family / Caregiver Present: No (neise left start of session)  Referring Practitioner: Dr. Ela Pereira  Subjective  Subjective: Pt agreeable to work with PT this afternoon-wants to use the toilet  General Comment  Comments: Pt resting in bed upon entry of PT and cleared by RN for session  Pain Screening  Patient Currently in Pain: No  Vital Signs  Patient Currently in Pain: No       Orientation  Orientation  Overall Orientation Status: Within Normal Limits  Cognition      Objective   Bed mobility  Supine to Sit: Stand by assistance (HOB elev)  Transfers  Sit to Stand: Minimal Assistance  Stand to sit: Minimal Assistance  Bed to Chair: Dependent/Total (to toilet via s stedy d/t pt c/o fatigue and sense of bowel urgency)  Ambulation  Ambulation?: No     Balance  Posture: Fair  Sitting - Static: Good  Sitting - Dynamic: Good;-  Standing - Static: Fair;+  Standing - Dynamic: Fair (AD)  Exercises  Heelslides: 10 B  Hip Abduction: 10 B  Knee Long Arc Quad: 10 B  Ankle Pumps: 10 B         Comment: toilet transfers mod A s stedy            AM-PAC Score  AM-PAC Inpatient Mobility Raw Score : 15 (01/27/22 1410)  AM-PAC Inpatient T-Scale Score : 39.45 (01/27/22 1410)  Mobility Inpatient CMS 0-100% Score: 57.7 (01/27/22 1410)  Mobility Inpatient CMS G-Code Modifier : CK (01/27/22 1410)          Goals  Short term goals  Time Frame for Short term goals: 1 week, 1/31/22 (unless otherwise specified)  Short term goal 1: Pt will transfer supine <-> sit with supervision  -2/01 cga  Short term goal 2: Pt will transfer sit <-> stand and bed>chair using RW or least AD with supervision   -1/27 min A rw    -2/01  NT  Short term goal 3: Pt will ambulate x 60 feet using RW or least AD with SBA   -1/27 min A rw 4'   -2/01  NT  Short term goal 4: By 1/26/22: Pt will tolerate 12-15 reps BLE exercise for strengthening, balance, and endurance   -1/27 initiated  Patient Goals   Patient goals : \"To get back to my normal activities at home\"    Plan    Plan  Times per week: 3-5x/week in acute care  Times per day: Daily  Specific instructions for Next Treatment: Progress ther ex and mobility as tolerated  Current Treatment Recommendations: Strengthening,Balance Training,Endurance Training,Functional Mobility Training,Transfer Training,Gait Training,Home Exercise Program,Safety Education & Training,Patient/Caregiver Education & Training,Equipment Evaluation, Education, & procurement  Safety Devices  Type of devices:  All fall risk precautions in place,Nurse notified,Gait belt,Patient at risk for falls,Call light within reach,Chair alarm in place,Bed alarm in place,Left in bed     Therapy Time   Individual Concurrent Group Co-treatment   Time In 1355         Time Out 1435         Minutes 40         Timed Code Treatment Minutes: Libby Cuellar

## 2022-02-01 NOTE — PLAN OF CARE
Problem: Nutrition  Goal: Optimal nutrition therapy  Outcome: Ongoing  Note: Nutrition Problem #1: Severe malnutrition  Intervention: Food and/or Nutrient Delivery: Continue Current Diet,Continue Oral Nutrition Supplement  Nutritional Goals: Pt will consume greater than 50% of meals and ONS w/o further weight loss

## 2022-02-02 NOTE — PROGRESS NOTES
Physical Therapy  Facility/Department: [facility] C4 PCU  Daily Treatment Note  NAME: Deo Forbes  : 1945  MRN: [id number]    Date of Service: 2022    Discharge Recommendations:  Subacute/Skilled Nursing Facility   PT Equipment Recommendations  Equipment Needed: No  Other: TBD at SNF    Assessment   Body structures, Functions, Activity limitations: Decreased functional mobility ; Decreased strength;Decreased endurance;Decreased balance;Decreased posture  Assessment: Pt requiring maxA x 1 for sit<>stand transfers w/o complete trunk extension using sw, steady. Recommend SNF at D/C in light of current deficits. Treatment Diagnosis: Decreased endurance and (I) with mobility  Prognosis: Good  Decision Making: Medium Complexity  PT Education: Goals; General Safety;PT Role;Plan of Care;Family Education;Disease Specific Education; Functional Mobility Training;Equipment;Precautions;Transfer Training;Energy Conservation  Patient Education: Disease-specific education: Pt educated on role of PT in hospital and pursed-lip breathing techniques during rest breaks; pt verbalizes understanding. REQUIRES PT FOLLOW UP: Yes  Activity Tolerance  Activity Tolerance: Patient Tolerated treatment well  Activity Tolerance: 128/69  HR 85  O2 88% after activity and returned to > 92% within 1 min while 3,5 L spo2     Patient Diagnosis(es): The primary encounter diagnosis was Tachycardia. Diagnoses of Shortness of breath and Paroxysmal atrial fibrillation ([address] were also pertinent to this visit.      has a past medical history of Acute bronchitis, Adrenal adenoma, Atrial fibrillation ([address] Atrial fibrillation ([address] Benign neoplasm of colon, Calculus of kidney, Colonic polyps, Diverticulitis, Diverticulitis of colon (without mention of hemorrhage)(562.11), Fatty liver, Hyperlipidemia, Kidney stone, Other and unspecified hyperlipidemia, Other chronic nonalcoholic liver disease, Palpitations, Renal cyst, Steatosis, liver, and Tobacco use disorder. has a past surgical history that includes Colonoscopy (7/08); doppler echocardiography (6/09); cardiovascular stress test (10/07, 10/05); liver biopsy (3/07); Kidney stone surgery (6/05); bronchoscopy (N/A, 1/24/2022); bronchoscopy (1/24/2022); and IR MIDLINE CATH (1/28/2022). Restrictions  Restrictions/Precautions  Restrictions/Precautions: Fall Risk,Up as Tolerated,General Precautions  Position Activity Restriction  Other position/activity restrictions: 3.5L of O2, telemetry with continuous pulse ox  Subjective   General  Chart Reviewed: Yes  Family / Caregiver Present: No  Referring Practitioner: Dr. Filemon Mandujano  Subjective  Subjective: Pt agreeable to work with PT  General Comment  Comments: Pt up in chair upon entry of PT and cleared by RN for session  Pain Screening  Patient Currently in Pain: Denies  Vital Signs  Patient Currently in Pain: Denies       Orientation  Orientation  Overall Orientation Status: Within Normal Limits  Cognition      Objective   Bed mobility  Supine to Sit: Unable to assess  Transfers  Sit to Stand: Maximum Assistance (max A to stand w/o completetrunk extension using sw, steady)  Stand to sit: Moderate Assistance  Bed to Chair: Unable to assess  Ambulation  Ambulation?: No (Pt unsable to stand fully extended, unsafe to attempt)     Balance  Posture: Fair  Sitting - Static: Fair;+  Sitting - Dynamic: Fair;+  Standing - Static: Poor (sw)  Exercises  Quad Sets: 10 B  Heelslides: 15 B  Gluteal Sets: 12  Hip Flexion: 15 B  Hip Abduction: 15 B  Knee Long Arc Quad: 15 B  Ankle Pumps: 20 B         Comment: Pt performed STS using sw ans stedy but unable to attain ful trunk extension or knee extension. Pt stood for 10\" on first attempt and for  ~ 3' next 2 attempts.   O2 sats decreased to 88% on 3.5 L spo2 and recovered in 2 mins to 94%            AM-PAC Score  AM-PAC Inpatient Mobility Raw Score : 15 (02/01/22 1432)  AM-PAC Inpatient T-Scale Score : 39.45 (02/01/22 1432)  Mobility Inpatient CMS 0-100% Score: 57.7 (02/01/22 1432)  Mobility Inpatient CMS G-Code Modifier : CK (02/01/22 1432)          Goals  Short term goals  Time Frame for Short term goals: 1 week, 1/31/22 (unless otherwise specified)  Short term goal 1: Pt will transfer supine <-> sit with supervision  -2/01 cga -2/02 NT  Short term goal 2: Pt will transfer sit <-> stand and bed>chair using RW or least AD with supervision   -1/27 min A rw    -2/02 max A to stand w/o complete trunk extension using sw, steady  Short term goal 3: Pt will ambulate x 60 feet using RW or least AD with SBA   -1/27 min A rw 4'   -2/02 ALYSHA  Short term goal 4: By 1/26/22: Pt will tolerate 12-15 reps BLE exercise for strengthening, balance, and endurance   2/02 on-going  Patient Goals   Patient goals : \"To get back to my normal activities at home\"    Plan    Plan  Times per week: 3-5x/week in acute care  Times per day: Daily  Specific instructions for Next Treatment: Progress ther ex and mobility as tolerated  Current Treatment Recommendations: Strengthening,Balance Training,Endurance Training,Functional Mobility Training,Transfer Training,Gait Training,Home Exercise Program,Safety Education & Training,Patient/Caregiver Education & Training,Equipment Evaluation, Education, & procurement  Safety Devices  Type of devices:  All fall risk precautions in place,Nurse notified,Gait belt,Patient at risk for falls,Call light within reach,Chair alarm in place,Bed alarm in place,Left in chair     Therapy Time   Individual Concurrent Group Co-treatment   Time In 1000         Time Out 1039         Minutes 39         Timed Code Treatment Minutes: [address] [facility] [address]

## 2022-02-02 NOTE — PLAN OF CARE
Problem: Falls - Risk of:  Goal: Will remain free from falls  Description: Will remain free from falls  2/1/2022 2345 by Payam Savage RN  Outcome: Ongoing  2/1/2022 2341 by Payam Savage RN  Outcome: Ongoing  Goal: Absence of physical injury  Description: Absence of physical injury  2/1/2022 2345 by Payam Savage RN  Outcome: Ongoing  2/1/2022 2341 by Payam Savage RN  Outcome: Ongoing     Problem: Pain:  Goal: Pain level will decrease  Description: Pain level will decrease  2/1/2022 2345 by Payam Savage RN  Outcome: Ongoing  2/1/2022 2341 by Payam Savage RN  Outcome: Ongoing  Goal: Control of acute pain  Description: Control of acute pain  2/1/2022 2345 by Payam Savage RN  Outcome: Ongoing  2/1/2022 2341 by Payam Savage RN  Outcome: Ongoing  Goal: Control of chronic pain  Description: Control of chronic pain  2/1/2022 2345 by Payam Savage RN  Outcome: Ongoing  2/1/2022 2341 by Payam Savage RN  Outcome: Ongoing     Problem: Nutrition  Goal: Optimal nutrition therapy  2/1/2022 2345 by Payam Savage RN  Outcome: Ongoing  2/1/2022 2341 by Payam Savage RN  Outcome: Ongoing  2/1/2022 1239 by Aydin Smith RD, LD  Outcome: Ongoing  Note: Nutrition Problem #1: Severe malnutrition  Intervention: Food and/or Nutrient Delivery: Continue Current Diet,Continue Oral Nutrition Supplement  Nutritional Goals: Pt will consume greater than 50% of meals and ONS w/o further weight loss       Problem: Skin Integrity:  Goal: Will show no infection signs and symptoms  Description: Will show no infection signs and symptoms  Outcome: Ongoing  Goal: Absence of new skin breakdown  Description: Absence of new skin breakdown  Outcome: Ongoing

## 2022-02-02 NOTE — PROGRESS NOTES
Hospitalist Progress Note      PCP: Maria Elena Ruiz MD    Date of Admission: 1/22/2022    Chief Complaint: Tachycardia/SOB    Subjective: no new c/o. Medications:  Reviewed    Infusion Medications    sodium chloride 25 mL (02/02/22 0001)     Scheduled Medications    guaiFENesin  600 mg Oral BID    methylPREDNISolone  40 mg IntraVENous Q12H    dilTIAZem  120 mg Oral BID    meropenem  1,000 mg IntraVENous Q8H    melatonin  5 mg Oral Nightly    enoxaparin  40 mg SubCUTAneous Daily    aspirin  81 mg Oral Daily    atorvastatin  40 mg Oral Daily    ferrous sulfate  325 mg Oral BID    FLUoxetine  10 mg Oral Daily    metoprolol tartrate  50 mg Oral BID    budesonide-formoterol  1 puff Inhalation BID    sodium chloride flush  5-40 mL IntraVENous 2 times per day     PRN Meds: hydrOXYzine, sodium chloride, albuterol, diphenhydrAMINE, sodium chloride flush, sodium chloride, ondansetron **OR** ondansetron, polyethylene glycol, acetaminophen **OR** acetaminophen, perflutren lipid microspheres      Intake/Output Summary (Last 24 hours) at 2/2/2022 0839  Last data filed at 2/2/2022 0444  Gross per 24 hour   Intake 490 ml   Output 950 ml   Net -460 ml       Physical Exam Performed:    BP (!) 146/79   Pulse 82   Temp 98.4 °F (36.9 °C) (Oral)   Resp 20   Ht 5' 8\" (1.727 m)   Wt 176 lb 12.8 oz (80.2 kg)   SpO2 96%   BMI 26.88 kg/m²     General appearance: No apparent distress, appears stated age and cooperative. HEENT: Pupils equal, round, and reactive to light. Conjunctivae/corneas clear. Neck: Supple, with full range of motion. No jugular venous distention. Trachea midline. Respiratory:  Normal respiratory effort. Clear to auscultation, bilaterally without Rales/Wheezes/Rhonchi. Cardiovascular: Regular rate and rhythm with normal S1/S2 without murmurs, rubs or gallops. Abdomen: Soft, non-tender, non-distended with normal bowel sounds. Musculoskeletal: No clubbing, cyanosis or edema bilaterally. Full range of motion without deformity. Skin: Skin color, texture, turgor normal.  No rashes or lesions. Neurologic:  Neurovascularly intact without any focal sensory/motor deficits. Cranial nerves: II-XII intact, grossly non-focal.  Psychiatric: Alert and oriented, thought content appropriate, normal insight  Capillary Refill: Brisk,< 3 seconds   Peripheral Pulses: +2 palpable, equal bilaterally       Labs:   Recent Labs     01/31/22 0520 02/01/22  0529 02/02/22  0449   WBC 10.5 9.6 9.9   HGB 8.2* 8.5* 8.6*   HCT 25.9* 26.9* 26.5*    404 396     Recent Labs     01/31/22 0520 02/01/22  0529 02/02/22  0449    138 136   K 5.6* 5.6* 4.9    101 100   CO2 28 33* 34*   BUN 36* 35* 38*   CREATININE <0.5* 0.6* 0.7*   CALCIUM 8.9 9.1 8.8     No results for input(s): AST, ALT, BILIDIR, BILITOT, ALKPHOS in the last 72 hours. No results for input(s): INR in the last 72 hours. No results for input(s): Chyrel Lions in the last 72 hours.     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  Normally rate controlled on Amiodarone/BBlocker - Amio d/c'd and BBlocker continued w/ CCBlocker added per Cardiology consult - appreciated. Anticoagulated at baseline on home Xarelto - held w/ hx GI Bleed. COPD - w/chronic respiratory failure on baseline home O2 at 3L. Normally controlled on home medication regimen - continued. Here w/ acute exacerbation - on IV Solumedrol - changed to PO 2 Feb.     Acute Respiratory Failure - w/ hypoxia, POArrival.  Presence of clinical respiratory distress w/ tachypnea/dyspnea/SOB and wheezing w/ use of accessory muscles to breath. Supplemental O2 and wean as tolerated. Pulmonology consulted and appreciated s/p Bronchoscopy 24 Jan.  BAL grew E. Coli ESBL/MDRO. Cytology negative for malignancy, fungal forms or PCP. PNA - gram negative as above. Infectious Disease consulted and appreciated on IV Merrem to continue IV Ertapenem through 4 Feb.       HTN/CAD - w/ known CAD s/p prior CABG, but no evidence of active signs/sxs of ischemia/failure. Currently controlled on home meds w/ vitals reviewed and documented as above. HyperLipidemia - controlled on home Statin. Continue, w/ f/u and med adjustment w/ PCP    Anemia - etiology clinically unable to determine, w/out evidence of active bleeding/hemolysis. Stable and asymptomatic w/out indication for transfusion. Follow serial labs. Reviewed and documented as above.      DVT Prophylaxis: LMWH     Recent Labs     01/31/22  0520 02/01/22  0529 02/02/22  0449    404 396     Diet: ADULT DIET; Regular  ADULT ORAL NUTRITION SUPPLEMENT; Lunch, Dinner; Frozen Oral Supplement  Code Status: Full Code      PT/OT Eval Status: seen w/ recs for SNF. Dispo - Patient is likely to remain in-house at least until Wed/Thurs 2/3 Feb pending clinical course, subspecialist recs. Plan to discharge to [facility].        Graciela Coffey MD

## 2022-02-02 NOTE — PROGRESS NOTES
Occupational Therapy    Pts chart reviewed and pt attempted to be seen this day at 8693 however pt refusing therapy at this time saying \"my legs just don't work\" and \"I'm too weak. \" Pt educated on the importance of therapy for building strength and endurance and the negative affects of refusing OOB activity. Pt demo understanding and continues to refuse services. Will attempt at a later time/date as medically appropriate.   Thank you  Vanessa Macario, OT

## 2022-02-02 NOTE — CARE COORDINATION
CASE MANAGEMENT DISCHARGE SUMMARY      Discharge to: [facility] completed: [address] Exemption Notification (HENS) completed: Yes    IMM given: (date) today    New Durable Medical Equipment ordered/agency: defer to SNF    Transportation:    Medical Transport explained to pt/family. Pt/family voice no agency preference.   No preference  Agency used:[facility]    time: 8:00 PM   Ambulance form completed: Yes    Confirmed discharge plan with:MD/Kathrin     Patient: yes     Family:  yes    Name:Mariana Trinidad Contact number:[phone]     Facility/Agency, name:  [facility] faxed to [facility] number for report to facility: [phone]     RN, name: James

## 2022-02-02 NOTE — PLAN OF CARE
Problem: Falls - Risk of:  Goal: Will remain free from falls  Description: Will remain free from falls  Outcome: Ongoing  Goal: Absence of physical injury  Description: Absence of physical injury  Outcome: Ongoing     Problem: Pain:  Goal: Pain level will decrease  Description: Pain level will decrease  Outcome: Ongoing  Goal: Control of acute pain  Description: Control of acute pain  Outcome: Ongoing  Goal: Control of chronic pain  Description: Control of chronic pain  Outcome: Ongoing     Problem: Nutrition  Goal: Optimal nutrition therapy  2/1/2022 2341 by Payam Savage RN  Outcome: Ongoing  2/1/2022 1239 by Aydin Smith RD, LD  Outcome: Ongoing  Note: Nutrition Problem #1: Severe malnutrition  Intervention: Food and/or Nutrient Delivery: Continue Current Diet,Continue Oral Nutrition Supplement  Nutritional Goals: Pt will consume greater than 50% of meals and ONS w/o further weight loss

## 2022-02-02 NOTE — PROGRESS NOTES
Physical Therapy  Facility/Department: [facility] C4 PCU  Daily Treatment Note  NAME: Yousif Stovall  : 1945  MRN: [id number]    Date of Service: 2022    Discharge Recommendations:  Subacute/Skilled Nursing Facility   PT Equipment Recommendations  Equipment Needed: No  Other: TBD at SNF    Assessment   Body structures, Functions, Activity limitations: Decreased functional mobility ; Decreased strength;Decreased endurance;Decreased balance;Decreased posture  Assessment: Pt requiring maxA x 1 for sit<>stand transfers w/o complete trunk extension using sw, steady. Recommend SNF at D/C in light of current deficits. Treatment Diagnosis: Decreased endurance and (I) with mobility  Prognosis: Good  Decision Making: Medium Complexity  PT Education: Goals; General Safety;PT Role;Plan of Care;Family Education;Disease Specific Education; Functional Mobility Training;Equipment;Precautions;Transfer Training;Energy Conservation  Patient Education: Disease-specific education: Pt educated on role of PT in hospital and pursed-lip breathing techniques during rest breaks; pt verbalizes understanding. REQUIRES PT FOLLOW UP: Yes  Activity Tolerance  Activity Tolerance: Patient Tolerated treatment well  Activity Tolerance: 128/69  HR 85  O2 88% after activity and returned to > 92% within 1 min while 3,5 L spo2     Patient Diagnosis(es): The primary encounter diagnosis was Tachycardia. Diagnoses of Shortness of breath and Paroxysmal atrial fibrillation ([address] were also pertinent to this visit.      has a past medical history of Acute bronchitis, Adrenal adenoma, Atrial fibrillation ([address] Atrial fibrillation ([address] Benign neoplasm of colon, Calculus of kidney, Colonic polyps, Diverticulitis, Diverticulitis of colon (without mention of hemorrhage)(562.11), Fatty liver, Hyperlipidemia, Kidney stone, Other and unspecified hyperlipidemia, Other chronic nonalcoholic liver disease, Palpitations, Renal cyst, Steatosis, liver, and Tobacco use disorder. has a past surgical history that includes Colonoscopy (7/08); doppler echocardiography (6/09); cardiovascular stress test (10/07, 10/05); liver biopsy (3/07); Kidney stone surgery (6/05); bronchoscopy (N/A, 1/24/2022); bronchoscopy (1/24/2022); and IR MIDLINE CATH (1/28/2022). Restrictions  Restrictions/Precautions  Restrictions/Precautions: Fall Risk,Up as Tolerated,General Precautions  Position Activity Restriction  Other position/activity restrictions: 3.5L of O2, telemetry with continuous pulse ox  Subjective   General  Chart Reviewed: Yes  Family / Caregiver Present: No  Referring Practitioner: Dr. Cyrus Britt  Subjective  Subjective: Pt agreeable to work with PT  General Comment  Comments: Pt up in chair upon entry of PT and cleared by RN for session  Pain Screening  Patient Currently in Pain: Denies  Vital Signs  Patient Currently in Pain: Denies       Orientation  Orientation  Overall Orientation Status: Within Normal Limits  Cognition      Objective   Bed mobility  Supine to Sit: Unable to assess  Transfers  Sit to Stand: Maximum Assistance (max A to stand w/o completetrunk extension using sw, steady)  Stand to sit: Moderate Assistance  Bed to Chair: Unable to assess  Ambulation  Ambulation?: No (Pt unsable to stand fully extended, unsafe to attempt)     Balance  Posture: Fair  Sitting - Static: Fair;+  Sitting - Dynamic: Fair;+  Standing - Static: Poor (sw)  Exercises  Quad Sets: 10 B  Heelslides: 15 B  Gluteal Sets: 12  Hip Flexion: 15 B  Hip Abduction: 15 B  Knee Long Arc Quad: 15 B  Ankle Pumps: 20 B         Comment: Pt performed STS using sw ans stedy but unable to attain ful trunk extension or knee extension. Pt stood for 10\" on first attempt and for  ~ 3' next 2 attempts.   O2 sats decreased to 88% on 3.5 L spo2 and recovered in 2 mins to 94%            AM-PAC Score  AM-PAC Inpatient Mobility Raw Score : 15 (02/01/22 1432)  AM-PAC Inpatient T-Scale Score : 39.45 (02/01/22 1432)  Mobility Inpatient CMS 0-100% Score: 57.7 (02/01/22 1432)  Mobility Inpatient CMS G-Code Modifier : CK (02/01/22 1432)          Goals  Short term goals  Time Frame for Short term goals: 1 week, 1/31/22 (unless otherwise specified)  Short term goal 1: Pt will transfer supine <-> sit with supervision  -2/01 cga -2/02 NT  Short term goal 2: Pt will transfer sit <-> stand and bed>chair using RW or least AD with supervision   -1/27 min A rw    -2/02 max A to stand w/o complete trunk extension using sw, steady  Short term goal 3: Pt will ambulate x 60 feet using RW or least AD with SBA   -1/27 min A rw 4'   -2/02 ALYSHA  Short term goal 4: By 1/26/22: Pt will tolerate 12-15 reps BLE exercise for strengthening, balance, and endurance   2/02 on-going  Patient Goals   Patient goals : \"To get back to my normal activities at home\"    Plan    Plan  Times per week: 3-5x/week in acute care  Times per day: Daily  Specific instructions for Next Treatment: Progress ther ex and mobility as tolerated  Current Treatment Recommendations: Strengthening,Balance Training,Endurance Training,Functional Mobility Training,Transfer Training,Gait Training,Home Exercise Program,Safety Education & Training,Patient/Caregiver Education & Training,Equipment Evaluation, Education, & procurement  Safety Devices  Type of devices:  All fall risk precautions in place,Nurse notified,Gait belt,Patient at risk for falls,Call light within reach,Chair alarm in place,Bed alarm in place,Left in chair     Therapy Time   Individual Concurrent Group Co-treatment   Time In 1000         Time Out 1039         Minutes 39         Timed Code Treatment Minutes: [address] [facility]

## 2022-02-02 NOTE — PROGRESS NOTES
Writer called report to NAHEED CHANG [facility], and spoke with ANJUM Caballero. IV's removed with no complications following. Patient tolerated well. All patient's belongings pack and are ready for discharge.     2/2/2022   Isi Anderson RN

## 2022-02-02 NOTE — PROGRESS NOTES
INPATIENT PULMONARY CRITICAL CARE PROGRESS NOTE      Reason for visit    Bronchiectasis/hypoxemia    SUBJECTIVE: Patient seen this morning was comfortably sitting in the recliner without any shortness of breath patient was afebrile and hemodynamically maintained, patient continues to be in atrial fibrillation with controlled ventricular rate on the monitor, patient was on 2 L of nasal cannula oxygen with saturation of 92% at rest, patient is quite weak and needs lot of assist to get up as per nursing  patient has had borderline urine output with cumulative fluid balance of -3. L, patient's p.o. intake is improving  patient's glycemic control was acceptable no other pertinent review of system of concern        Physical Exam:  Blood pressure 128/69, pulse 85, temperature 97.9 °F (36.6 °C), temperature source Oral, resp. rate 20, height 5' 8\" (1.727 m), weight 176 lb 12.8 oz (80.2 kg), SpO2 92 %.'     Constitutional:   No respiratory distress   HENT:  Oropharynx is clear and moist. No thyromegaly. Eyes:  Conjunctivae are normal. Pupils equal, round, and reactive to light. No scleral icterus. pallor   Neck: . No tracheal deviation present. No obvious thyroid mass. Cardiovascular: s1s2 irregularly irregular , normal heart sounds. No right ventricular heave. No lower extremity edema. Pulmonary/Chest: No wheezes. Decreased B/L  Rales no significantchest congestion   Chest wall is not dull to percussion. No accessory muscle usage or stridor. Decreased BSI with prolonged expiration   Abdominal: Soft. Bowel sounds present. No distension or hernia. No tenderness. Musculoskeletal: No cyanosis. No clubbing. No obvious joint deformity. Lymphadenopathy: No cervical or supraclavicular adenopathy. Skin: Skin is warm and dry. No rash or nodules on the exposed extremities. Psychiatric: Normal mood and affect. Behavior is normal.  No anxiety. Neurologic: Alert, awake and oriented. PERRL.   Speech fluent       Results:  CBC:   Recent Labs     01/31/22  0520 02/01/22  0529 02/02/22  0449   WBC 10.5 9.6 9.9   HGB 8.2* 8.5* 8.6*   HCT 25.9* 26.9* 26.5*   MCV 91.2 91.5 90.8    404 396     BMP:   Recent Labs     01/31/22  0520 02/01/22  0529 02/02/22  0449    138 136   K 5.6* 5.6* 4.9    101 100   CO2 28 33* 34*   BUN 36* 35* 38*   CREATININE <0.5* 0.6* 0.7*       Imaging:  I have reviewed radiology images personally. IR MIDLINE CATH   Final Result      XR CHEST PORTABLE   Final Result   Bilateral upper lobe opacification superimposed on chronic interstitial   changes concerning for pneumonia. XR CHEST PORTABLE   Final Result   Chronic appearing reticulonodular opacities.                 50,000 to 100,000 CFU/mL   CONTACT PRECAUTIONS INDICATED   Carbapenem antibiotics are the best option for infections caused   by ESBL producing organisms.  Other antibiotic classes are   likely to result in treatment failure, even for organisms   demonstrating in vitro susceptibility.    Abnormal     *Additional information available - narrative            Contains abnormal data Culture, Respiratory  Order: [id number]   Status: Edited Result - FINAL     Visible to patient: No (not released)     Next appt: None    Specimen Information: BAL Quantitative Count         0 Result Notes    Component 1/24/22 1043 Resulting Agency   CULTURE, RESPIRATORY Normal respiratory daniel absent Abnormal   [facility]   Gram Stain Result 3+ WBC's (Polymorphonuclear) present   1+ Gram positive cocci  in pairs- resembling Strep  [facility]   Organism Escherichia coli ESBL Abnormal   [address] [facility]   CULTURE, RESPIRATORY  Abnormal   50,000 to 100,000 CFU/mL   CONTACT PRECAUTIONS INDICATED   Carbapenem antibiotics are the best option for infections caused   by ESBL producing organisms.  Other antibiotic classes are   likely to result in treatment failure, even for organisms   demonstrating in vitro susceptibility. Lung, Right Lower Lobe, Bronchoalveolar Lavage:      - Negative for malignancy.      - Severe acute inflammation present.      - PAS-fungus stain negative for fungal forms and Pneumocystis        jirovecii. Assessment:  Principal Problem:    Atrial fibrillation (HCC)  Active Problems:    HTN (hypertension)    S/P CABG x 4    Lung nodules    Pneumonia of both lower lobes due to Escherichia coli (HCC)    Severe malnutrition ([address]    Debility  Resolved Problems:    * No resolved hospital problems.  *          Plan:   · Oxygen supplementation to keep saturation between 90 and 94% only  · Please titrate oxygen as per the above parameters  · Patient now on 2 lts/min of nasal cannula oxygen when seen this morning   · Pulmonary toilet  · Status post bronchoscopy which shows patient to have ESBL E. coli pneumonia  · Continue IV meropenem for total of 10-14 days   · Patient is on Symbicort which can be continued  · Albuterol On as needed basis  · Patient is on IV Solu-Medrol  · Cardiac medications as per IM/cardiology   · Oral diet and supplements as per speech therapy/metabolic support  · PUD prophylaxis as per IM  · PT/OT-may need SNF      ?Discharge planning     No other recommendations from Pulmonary/CCM stand point -will sign off -please call on PRN basis ,if the patient is not discharged          Electronically signed by:  Emil Aguirre MD    2/2/2022    3:46 PM.

## 2022-02-03 NOTE — DISCHARGE SUMMARY
Hospital Medicine Discharge Summary    Patient ID: Yousif Links      Patient's PCP: Aletha Herrera MD    Admit Date: 1/22/2022     Discharge Date: 2/2/2022      Admitting Physician: Ksenia Fernandez MD     Discharge Physician: Candance Raker, MD     Discharge Diagnoses: Active Hospital Problems    Diagnosis     S/P CABG x 4 [Z95.1]      Priority: High    HTN (hypertension) [I10]      Priority: High    Atrial fibrillation (HCC) [I48.91]      Priority: Medium    Debility [R53.81]     Severe malnutrition (HCC) [E43]     Pneumonia of both lower lobes due to Escherichia coli ([facility] [J15.5]     Lung nodules [R91.8]        The patient was seen and examined on day of discharge and this discharge summary is in conjunction with any daily progress note from day of discharge. Hospital Course:       Afib - chronic persistent of unspecified and clinically unable to determine etiology, w/ RVR POArrival.  Normally rate controlled on Amiodarone/BBlocker - Amio d/c'd and BBlocker continued w/ CCBlocker added per Cardiology consult - appreciated. Anticoagulated at baseline on home Xarelto - held w/ hx GI Bleed.      COPD - w/chronic respiratory failure on baseline home O2 at 3L. Normally controlled on home medication regimen - continued. Here w/ acute exacerbation - on IV Solumedrol - changed to PO 2 Feb.      Acute Respiratory Failure - w/ hypoxia, POArrival.  Presence of clinical respiratory distress w/ tachypnea/dyspnea/SOB and wheezing w/ use of accessory muscles to breath. Supplemental O2 and wean as tolerated. Pulmonology consulted and appreciated s/p Bronchoscopy 24 Jan.  BAL grew E. Coli ESBL/MDRO. Cytology negative for malignancy, fungal forms or PCP.      PNA - gram negative as above. Infectious Disease consulted and appreciated on IV Merrem to continue IV Ertapenem through 4 Feb.       HTN/CAD - w/ known CAD s/p prior CABG, but no evidence of active signs/sxs of ischemia/failure.  Currently controlled on home meds      HyperLipidemia - controlled on home Statin. Continue, w/ f/u and med adjustment w/ PCP     Anemia - etiology clinically unable to determine, w/out evidence of active bleeding/hemolysis. Stable and asymptomatic w/out indication for transfusion. Labs: For convenience and continuity at follow-up the following most recent labs are provided:      CBC:    Lab Results   Component Value Date    WBC 9.9 02/02/2022    HGB 8.6 02/02/2022    HCT 26.5 02/02/2022     02/02/2022       Renal:    Lab Results   Component Value Date     02/02/2022    K 4.9 02/02/2022     02/02/2022    CO2 34 02/02/2022    BUN 38 02/02/2022    CREATININE 0.7 02/02/2022    CALCIUM 8.8 02/02/2022    PHOS 3.2 05/01/2014         Significant Diagnostic Studies    Radiology:   IR MIDLINE CATH   Final Result      XR CHEST PORTABLE   Final Result   Bilateral upper lobe opacification superimposed on chronic interstitial   changes concerning for pneumonia. XR CHEST PORTABLE   Final Result   Chronic appearing reticulonodular opacities. Consults:     IP CONSULT TO CARDIOLOGY  IP CONSULT TO PULMONOLOGY  IP CONSULT TO CARDIOLOGY  IP CONSULT TO INFECTIOUS DISEASES    Disposition: SNF - [facility]    Condition at Discharge: Stable    Discharge Instructions/Follow-up:  w/ PCP 1-2 weeks and subspecialists as arranged.      Code Status:  Full Code    Activity: activity as tolerated    Diet: regular diet      Discharge Medications:     Discharge Medication List as of 2/2/2022  6:07 PM           Details   hydrOXYzine (ATARAX) 10 MG tablet Take 1 tablet by mouth 3 times daily as needed for Anxiety, Disp-90 tablet, R-0Print      diphenhydrAMINE (BENADRYL) 50 MG tablet Take 1 tablet by mouth nightly as needed for Sleep, Disp-30 tablet, R-0Print      dilTIAZem (CARDIZEM CD) 120 MG extended release capsule Take 1 capsule by mouth 2 times daily, Disp-60 capsule, R-0Print              Details   ferrous sulfate (IRON 325) 325 (65 Fe) MG tablet Take 325 mg by mouth 2 times dailyHistorical Med      FLUoxetine (PROZAC) 10 MG capsule Take 10 mg by mouth dailyHistorical Med      ATROVENT HFA 17 MCG/ACT inhaler INHALE 1 PUFF INTO LUNGS THREE TIMES DAILY, Disp-25.8 g, R-2Normal      Oral Medication Containers (MEDICATION CONTAINER/SMALL) MISC 4 TIMES DAILY PRN Starting 2/19/2015, Until Discontinued, Disp-1 each, R-0, [employer] Mixture:  80 cc Viscous Lidocaine, 20 mg Hydrocortisone, 100 mg Doxycycline, 2 million units Nystatin, qs up to 180 cc with Benadryl elixir (cherry or grape)      fluticasone (FLONASE) 50 MCG/ACT nasal spray USE 2 SPRAYS IN EACH NOSTRIL DAILY, Disp-1 Bottle, R-5      Pseudoephedrine-Ibuprofen  MG TABS Take 1 capsule by mouth 4 times daily as needed. , Disp-24 tablet, R-0      atorvastatin (LIPITOR) 40 MG tablet TAKE 1 TABLET BY MOUTH ONCE DAILY, Disp-90 tablet, R-3**Patient requests 90 days supply**      Omega-3 Fatty Acids (OMEGA 3 PO) Take  by mouth. docusate sodium 100 MG CAPS Take 100 mg by mouth 2 times daily as needed for Constipation. potassium chloride (POTASSIUM CHLORIDE) 10 MEQ tablet Take 1 tablet by mouth 3 times daily (with meals) for 10 days. , Disp-30 tabletDC to SNF      aspirin 81 MG chewable tablet Take 1 tablet by mouth daily. , Disp-30 tablet, R-5      metoprolol (LOPRESSOR) 50 MG tablet Take 1 tablet by mouth 2 times daily. Hold this medication if Systolic blood pressure (top number) is less than 90 or if heart rate is less than 60 and call Dr. Oumar Palacios office for further instructions. Ask for YOLIE HERNANDEZ [facility], [facility]. Call [phone]., Disp-60 tab let      fluticasone-salmeterol (ADVAIR DISKUS) 250-50 MCG/DOSE AEPB Inhale 1 puff into the lungs every 12 hours. Time Spent on discharge is more than 30 minutes in the examination, evaluation, counseling and review of medications and discharge plan.       Signed:    Arthur Salcedo MD   2/3/2022      Thank you Salud Frey Kriss Schaumann, MD for the opportunity to be involved in this patient's care. If you have any questions or concerns please feel free to contact me at [phone].

## 2022-02-08 NOTE — TELEPHONE ENCOUNTER
Nikki,[phone], with [facility] called to say this pt came to them on 2/2 with NO IV access  He has IV ATB orders on his DC paperwork  He was to end IV ATB tx on 2/4

## 2022-02-08 NOTE — TELEPHONE ENCOUNTER
Thanks for the update  We placed a midline on 1/28/22. Unfortunately, looks like it may have been removed as part of the DC process  ? How is he doing? no

## 2022-02-10 NOTE — TELEPHONE ENCOUNTER
Ameena Pryor at [facility] -     Patient is weak  UE edema     On 3L 97%  No fever     Will not plan to resume the abx, continue to monitor expectantly   Call with concerns  No more labs needed

## 2022-02-26 PROBLEM — J96.91 RESPIRATORY FAILURE WITH HYPOXIA (HCC): Status: ACTIVE | Noted: 2022-01-01

## 2022-02-26 PROBLEM — J44.9 COPD (CHRONIC OBSTRUCTIVE PULMONARY DISEASE) (HCC): Status: ACTIVE | Noted: 2022-01-01

## 2022-02-26 NOTE — ED PROVIDER NOTES
[address] [facility]  ED  EMERGENCY DEPARTMENT ENCOUNTER      Pt Name: Alanis Rachel  MRN: [id number]  [place] 1945  Date of evaluation: 2/26/2022  Provider: Howard Krishna MD    CHIEF COMPLAINT       Chief Complaint   Patient presents with    Shortness of Breath     pt to ED via EMS from [facility] with c/o SOB. pt on 5 L O2 at baseline. dysnpea on exertion         HISTORY OF PRESENT ILLNESS   (Location/Symptom, Timing/Onset, Context/Setting, Quality, Duration, Modifying Factors, Severity)  Note limiting factors. Alanis Rachel is a 67 y.o. male with past medical history of hypertension, hyperlipidemia, coronary artery disease status post CABG, atrial fibrillation and COPD with chronic hypoxic respiratory failure on 3 to 4 L nasal cannula oxygen here with worsening shortness of breath    Patient presents emergency department today with worsening shortness of breath. Recently discharged from the hospital on 2/3/22 for ESBL/MDR O pneumonia status post antibiotics. He notes he is just gotten more more short of breath. States he has had a cough the cough is been productive. Notes occasional aching chest discomfort. No fevers or chills. No abdominal pain, vomiting or diarrhea. States he has had some swelling in his lower legs. Notes despite going up on his oxygen at his care facility he continues to feel short of breath. HPI    Nursing Notes were reviewed. REVIEW OF SYSTEMS    (2-9 systems for level 4, 10 or more for level 5)     Review of Systems    Please see HPI for pertinent positive and negative review of system findings. A full 10 system ROS was performed and otherwise negative.         PAST MEDICAL HISTORY     Past Medical History:   Diagnosis Date    Acute bronchitis     Adrenal adenoma 6/05    noted on CT, 2.2 x 1cm    Atrial fibrillation ([address]     Atrial fibrillation ([address] 5/12/2010    Benign neoplasm of colon 5/17/2010    Calculus of kidney     Colonic polyps benign    Diverticulitis     Diverticulitis of colon (without mention of hemorrhage)(562.11) 5/17/2010    Fatty liver     Hyperlipidemia     Kidney stone 6/05    Other and unspecified hyperlipidemia 5/17/2010    Other chronic nonalcoholic liver disease 5/26/4203    Palpitations 8/04    echo showed diastolic dysfunction, holter monitor ( + )  for brief runs of SVT    Renal cyst 11/07    bilateral (7cm on L and 3 cm on R), no adrenal mass    Steatosis, liver 5/22/2010    Tobacco use disorder 5/17/2010         SURGICAL HISTORY       Past Surgical History:   Procedure Laterality Date    BRONCHOSCOPY N/A 1/24/2022    BRONCHOSCOPY ALVEOLAR LAVAGE performed by Genna Montoya MD at [address]  1/24/2022    BRONCHOSCOPY THERAPUTIC ASPIRATION INITIAL performed by Genna Montoya MD at [address] [facility] TEST  10/07, 10/05    stress echo-normal, negative    COLONOSCOPY  7/08    polyps and diverticuli, tubular adenoma    DOPPLER ECHOCARDIOGRAPHY  6/09    moderate in crease LA, otherwise ( - )    IR MIDLINE CATH  1/28/2022    IR MIDLINE CATH 1/28/2022 Health system SPECIAL PROCEDURES    KIDNEY STONE SURGERY  6/05    LIVER BIOPSY  3/07    fatty liver - no autoimmune hepatits         CURRENT MEDICATIONS       Previous Medications    ASPIRIN 81 MG CHEWABLE TABLET    Take 1 tablet by mouth daily. ATORVASTATIN (LIPITOR) 40 MG TABLET    TAKE 1 TABLET BY MOUTH ONCE DAILY    ATROVENT HFA 17 MCG/ACT INHALER    INHALE 1 PUFF INTO LUNGS THREE TIMES DAILY    DILTIAZEM (CARDIZEM CD) 120 MG EXTENDED RELEASE CAPSULE    Take 1 capsule by mouth 2 times daily    DIPHENHYDRAMINE (BENADRYL) 50 MG TABLET    Take 1 tablet by mouth nightly as needed for Sleep    DOCUSATE SODIUM 100 MG CAPS    Take 100 mg by mouth 2 times daily as needed for Constipation.     FERROUS SULFATE (IRON 325) 325 (65 FE) MG TABLET    Take 325 mg by mouth 2 times daily    FLUOXETINE (PROZAC) 10 MG CAPSULE    Take 10 mg by mouth daily    FLUTICASONE (FLONASE) 50 MCG/ACT NASAL SPRAY    USE 2 SPRAYS IN EACH NOSTRIL DAILY    FLUTICASONE-SALMETEROL (ADVAIR DISKUS) 250-50 MCG/DOSE AEPB    Inhale 1 puff into the lungs every 12 hours. HYDROXYZINE (ATARAX) 10 MG TABLET    Take 1 tablet by mouth 3 times daily as needed for Anxiety    METOPROLOL (LOPRESSOR) 50 MG TABLET    Take 1 tablet by mouth 2 times daily. Hold this medication if Systolic blood pressure (top number) is less than 90 or if heart rate is less than 60 and call Dr. Anita Christensen office for further instructions. Ask for [facility]NANDEZ [facility]. Call [phone]. OMEGA-3 FATTY ACIDS (OMEGA 3 PO)    Take  by mouth. ORAL MEDICATION CONTAINERS (MEDICATION CONTAINER/SMALL) MISC    Take 5 mLs by mouth 4 times daily as needed. Miles Mixture:  80 cc Viscous Lidocaine, 20 mg Hydrocortisone, 100 mg Doxycycline, 2 million units Nystatin, qs up to 180 cc with Benadryl elixir (cherry or grape)    POTASSIUM CHLORIDE (POTASSIUM CHLORIDE) 10 MEQ TABLET    Take 1 tablet by mouth 3 times daily (with meals) for 10 days. PSEUDOEPHEDRINE-IBUPROFEN  MG TABS    Take 1 capsule by mouth 4 times daily as needed. ALLERGIES     Patient has no known allergies.     FAMILY HISTORY       Family History   Problem Relation Age of Onset    Macular Degen Sister     Thyroid Disease Mother     Cancer Neg Hx     Diabetes Neg Hx     Heart Disease Neg Hx     Kidney Disease Neg Hx           SOCIAL HISTORY       Social History     Socioeconomic History    Marital status: Single     Spouse name: None    Number of children: None    Years of education: None    Highest education level: None   Occupational History    None   Tobacco Use    Smoking status: Former Smoker     Packs/day: 1.00     Years: 52.00     Pack years: 52.00     Types: Cigarettes     Quit date: 3/29/2013     Years since quittin.9    Smokeless tobacco: Never Used    Tobacco comment: using chantix   Substance and Sexual Activity  Alcohol use: No    Drug use: Never    Sexual activity: None   Other Topics Concern    None   Social History Narrative    None     Social Determinants of Health     Financial Resource Strain:     Difficulty of Paying Living Expenses: Not on file   Food Insecurity:     Worried About Running Out of Food in the Last Year: Not on file    Veronica of Food in the Last Year: Not on file   Transportation Needs:     Lack of Transportation (Medical): Not on file    Lack of Transportation (Non-Medical): Not on file   Physical Activity:     Days of Exercise per Week: Not on file    Minutes of Exercise per Session: Not on file   Stress:     Feeling of Stress : Not on file   Social Connections:     Frequency of Communication with Friends and Family: Not on file    Frequency of Social Gatherings with Friends and Family: Not on file    Attends Zoroastrianism Services: Not on file    Active Member of [address] [place] [address] [employer] or Organizations: Not on file    Attends Club or Organization Meetings: Not on file    Marital Status: Not on file   Intimate Partner Violence:     Fear of Current or Ex-Partner: Not on file    Emotionally Abused: Not on file    Physically Abused: Not on file    Sexually Abused: Not on file   Housing Stability:     Unable to Pay for Housing in the Last Year: Not on file    Number of [place] in the Last Year: Not on file    Unstable Housing in the Last Year: Not on file       SCREENINGS    Diego Coma Scale  Eye Opening: Spontaneous  Best Verbal Response: Oriented  Best Motor Response: Obeys commands  Diego Coma Scale Score: 15          PHYSICAL EXAM    (up to 7 for level 4, 8 or more for level 5)     ED Triage Vitals [02/26/22 1558]   BP Temp Temp Source Pulse Resp SpO2 Height Weight   129/69 98.7 °F (37.1 °C) Oral 99 22 90 % 5' 7\" (1.702 m) 165 lb (74.8 kg)       Physical Exam    General appearance:  Cooperative. Mild increased work of breathing  Skin:  Warm. Dry.    Eye:  Extraocular movements Narrative:     Performed at:  [address], [facility] [employer]   Phone [phone]   BLOOD GAS, VENOUS - Abnormal; Notable for the following components:    pCO2, Benedicto 64.6 (*)     pO2, Benedicto 44.0 (*)     HCO3, Venous 41.7 (*)     Base Excess, Benedicto 15.2 (*)     Carboxyhemoglobin 2.5 (*)     All other components within normal limits    Narrative:     Performed at:  [address], [facility] [employer]   Phone [phone]   LACTATE, SEPSIS - Abnormal; Notable for the following components:    Lactic Acid, Sepsis 2.3 (*)     All other components within normal limits    Narrative:     Performed at:  [place] [address] [employer]   Phone [phone]   D-DIMER, QUANTITATIVE - Abnormal; Notable for the following components:    D-Dimer, Quant 734 (*)     All other components within normal limits    Narrative:     Performed at:  [address], [facility] [employer]   Phone [phone], RAPID    Narrative:     Performed at:  [address], [address] [employer]   Phone [phone]   TROPONIN    Narrative:     Performed at:  [place] [address] [employer]   Phone [phone]   APTT    Narrative:     Performed at:  [address], [facility] [employer]   Phone [phone]   URINALYSIS WITH REFLEX TO CULTURE    Narrative:     Performed at:  [address], [facility] [employer]   Phone [phone]   LACTATE, SEPSIS       Interpretation per the Radiologist below, if obtained/available at the time of this note:    CT CHEST PULMONARY EMBOLISM W CONTRAST   Final Result   1.  Extensive airway inflammation with opacification of lobar, segmental, and   subsegmental bronchi. Differential includes infection, including   endobronchial tuberculosis, and endobronchial spread of tumor. 2. Inflammation of distal airways, multifocal airspace disease, and small   loculated left pleural effusion. Correlate with presentation for pneumonia   and empyema. 3.  Motion degraded evaluation of the pulmonary arteries. No acute pulmonary   embolism to the proximal segmental arteries given limitation. 4. Other findings as described. XR CHEST PORTABLE   Preliminary Result   Shifting distribution of multifocal consolidation with similar overall   severity. All other labs/imaging were within normal range or not returned as of this dictation. EMERGENCY DEPARTMENT COURSE and DIFFERENTIAL DIAGNOSIS/MDM:   Vitals:    Vitals:    02/26/22 1648 02/26/22 1659 02/26/22 1739 02/26/22 1843   BP:  122/71 118/85    Pulse: 101 93 95    Resp: 18 22  (!) 31   Temp:       TempSrc:       SpO2: 95% 92% 94%    Weight:       Height:           EKG: Atrial fibrillation rapid ventricular sponsor rate of 105 bpm.  Right bundle branch block. No ST elevation. Overall similar to prior from 1/24/22. Patient presents the emergency department today complaining of shortness of breath and cough. Was exhibiting some increased work of breathing here. Oxygen requirement now 5 L nasal cannula. Recent admission for pneumonia secondary to multidrug-resistant/ESBL E. coli pneumonia. Work-up here was notable for recurrent and worsening pneumonia. There is PE work-up was negative. Troponin negative. Does show signs of sepsis with elevated white blood cell count, elevated lactic acid. Transfused large-volume IV fluids and broad-spectrum antibiotics with vancomycin and meropenem to cover for his recent ESBL infection.   Plan to admit to the hospital.  Was placed on BiPAP for comfort    MDM    CONSULTS     IP CONSULT TO HOSPITALIST    Critical Care: CRITICAL CARE TIME   Total Critical Care time was 30 minutes, excluding separately reportable procedures. There was a high probability of clinically significant/life threatening deterioration in the patient's condition which required my urgent intervention. This time was spent reviewing the patient chart, interpreting diagnostic/laboratory data, transfusion of large-volume IV fluids and broad-spectrum antibiotics to treat for healthcare associated pneumonia. Management of BiPAP settings. REASSESSMENT          PROCEDURE     Unless otherwise noted below, none     Procedures      FINAL IMPRESSION      1. HCAP (healthcare-associated pneumonia)    2. Chronic respiratory failure with hypoxia and hypercapnia (HCC)            DISPOSITION/PLAN   DISPOSITION Decision To Admit 02/26/2022 06:16:06 PM        PATIENT REFERRED TO:  No follow-up provider specified. DISCHARGE MEDICATIONS:  New Prescriptions    No medications on file     Controlled Substances Monitoring:     No flowsheet data found.     (Please note that portions of this note were completed with a voice recognition program.  Efforts were made to edit the dictations but occasionally words are mis-transcribed.)    Tamir Myers MD (electronically signed)  Attending Emergency Physician            Jessie Riddle MD  02/26/22 0279

## 2022-02-27 NOTE — ACP (ADVANCE CARE PLANNING)
ADVANCED CARE PLANNING    Name:De Day       :  1945              MRN:  [id number]  Admission Date: 2022  3:55 PM  Date of Discussion:  22       Purpose of Encounter: Advanced care planning in light of acute hypoxic respiratory failure  Parties in attendance: :Anjali Benitez, Pierre Gaytan MD.  Monique Sunny was present for the latter part of the conversation. Decisional Capacity:Yes      Objective/Medical Story:  Patient is tired of fighting to survive. He is consistently suffering. He sees that his prognosis is poor. He asked me to put him on life support because he is ready to go unconscious and die. I told him that he doesn't have to go on life support to die. He has felt this way for some time but he hasn't mentioned it to anyone because he felt like \"giving up\" was against his [facility] [employer]. When we talked about the goals of comfort care and hospice, and how these don't contradict any Worship beliefs, he determined that he did indeed want hospice. I called Mariana and she agreed with the plan for comfort care medications and hospice consult. She understands that he might pass away today or in the next few days. Alternatively, he might survive for a few weeks or months, cannot rule that out at this early stage. Active Diagnoses:     Active Hospital Problems    Diagnosis Date Noted    S/P CABG x 4 [Z95.1] 2013     Priority: High    Coronary atherosclerosis of native coronary artery [I25.10] 2013     Priority: High    HTN (hypertension) [I10] 2010     Priority: High    HLD (hyperlipidemia) [E78.5] 2010     Priority: High    Atrial fibrillation ([facility] [I48.91] 2010     Priority: Medium    Respiratory failure with hypoxia ([facility] [J96.91] 2022    COPD (chronic obstructive pulmonary disease) ([facility] [J44.9] 2022    Pneumonia of both lower lobes due to Escherichia coli [facility]) [J15.5] 2022    H/O pneumonia due to Pseudomonas [Z87.01] 01/24/2022       These active diagnoses are of sufficient risk that focused discussion on advance care planning is indicated in order to allow the patient to thoughtfully consider personal goals of care; and if situations arise that prevent the ability to personally give input, to ensure appropriate representation of their personal desires for different levels and agressiveness of care. Goals of Care Determinations: Patient wishes to focus on comfort. Code Status: At this time patient wishes to be [facility]         Time Spent on Advanced Planning Documents: 16 mins. The following items were considered in medical decision making:  Independent review of images , Review / order clinical lab tests. Review / order radiology tests, Decision to obtain old records. Advanced Care Planning Documents:  Completed advances directives, advanced directives in chart. Electronically signed by Delonte Keith MD on 2/27/2022 at 10:04 AM    Thank you Catherine Patterson MD for the opportunity to be involved in this patient's care. If you have any questions or concerns please feel free to contact me at [phone].

## 2022-02-27 NOTE — PROGRESS NOTES
[address] or Facility: [facility] From: [facility] Blue RE: Elayne Clause 1945 RM: [address] [facility] is here. Are you wanting the patient to go to inpatient unit or stay here, family as agreeable to either one. If patient stays here, he needs to be discharged and readmitted under hospice care here. he [facility]) is back by room 430 if you want to talk to her. thank you Need Callback: NO CALLBACK REQ C4 PROGRESSIVE CARE    Sent to Dr Pablo Trimble via perfect serve.

## 2022-02-27 NOTE — CARE COORDINATION
[facility] [address] [facility], [facility] with pt adán Bradshaw at 0. RN aware.

## 2022-02-27 NOTE — PROGRESS NOTES
02/27/22 0000   NIV Type   $NIV $Daily Charge   Skin Protection for O2 Device Yes   Location Nose   Equipment Type v60   Mode Bilevel   Mask Type Full face mask   Mask Size Large   Settings/Measurements   IPAP 12 cmH20   CPAP/EPAP 5 cmH2O   Rate Ordered 10   Resp 18   FiO2  50 %   I Time/ I Time % 1 s   Vt Exhaled 636 mL   Minute Volume 12 Liters   Comfort Level Good   Using Accessory Muscles No   SpO2 97   Alarm Settings   Alarms On Y   Press Low Alarm 6 cmH2O   High Pressure Alarm 30 cmH2O   Apnea (secs) 20 secs   Resp Rate Low Alarm 6   High Respiratory Rate 40 br/min

## 2022-02-27 NOTE — PROGRESS NOTES
02/26/22 2035   NIV Type   Skin Protection for O2 Device Yes   Location Nose   NIV Started/Stopped On   Equipment Type v60   Mode Bilevel   Mask Type Full face mask   Mask Size Large   Settings/Measurements   IPAP 12 cmH20   CPAP/EPAP 5 cmH2O   Rate Ordered 10   Resp 24   FiO2  50 %   I Time/ I Time % 1 s   Vt Exhaled 647 mL   Minute Volume 12.6 Liters   Mask Leak (lpm) 15 lpm   Comfort Level Good   Using Accessory Muscles Yes   SpO2 97   Breath Sounds   Right Upper Lobe Diminished   Right Middle Lobe Crackles   Right Lower Lobe Diminished   Left Upper Lobe Diminished;Crackles   Left Lower Lobe Crackles   Alarm Settings   Alarms On Y   Press Low Alarm 6 cmH2O   High Pressure Alarm 30 cmH2O   Apnea (secs) 20 secs   Resp Rate Low Alarm 6   High Respiratory Rate 40 br/min

## 2022-02-27 NOTE — PROGRESS NOTES
02/27/22 0322   NIV Type   Skin Protection for O2 Device Yes   Location Nose   Equipment Type v60   Mode Bilevel   Mask Type Full face mask   Mask Size Large   Settings/Measurements   IPAP 15 cmH20   CPAP/EPAP 5 cmH2O   Rate Ordered 10   Resp 25   FiO2  50 %   I Time/ I Time % 1 s   Vt Exhaled 714 mL   Minute Volume 16 Liters   Mask Leak (lpm) 15 lpm   Comfort Level Good   Using Accessory Muscles No   SpO2 97   Alarm Settings   Alarms On Y   Press Low Alarm 6 cmH2O   High Pressure Alarm 30 cmH2O   Apnea (secs) 20 secs   Resp Rate Low Alarm 6   High Respiratory Rate 40 br/min

## 2022-02-27 NOTE — H&P
Hospital Medicine History & Physical      PCP: Aletha Herrera MD    Date of Admission: 2/26/2022    Date of Service: Pt seen/examined on 2/26/2022 and Admitted to Inpatient with expected LOS greater than two midnights due to medical therapy. Chief Complaint: Shortness of breath      History Of Present Illness:  67 y.o. male who presented to [facility] with shortness of breath  Patient with PMH of A. Fib, COPD on home O2 2 to 3 L, recent ESBL E. coli pneumonia treated with meropenem, HTN, CAD s/p CABG, HLD who is a resident at the [facility] presented to the ER today with complaints of shortness of breath. He was recently admitted and discharged on 2/2/2022 from [facility]. He was diagnosed with acute respiratory failure, MDRO pneumonia. He required bronchoscopy. He was treated with IV meropenem completed with ertapenem for 2 weeks. Patient reports over the past few days he has gotten more short of breath, continues to have a productive cough. Feels chest heaviness sometimes. He denied any fevers or chills. No nausea vomiting or diarrhea.        Past Medical History:          Diagnosis Date    Acute bronchitis     Adrenal adenoma 6/05    noted on CT, 2.2 x 1cm    Atrial fibrillation (HCC)     Atrial fibrillation ([address] 5/12/2010    Benign neoplasm of colon 5/17/2010    Calculus of kidney     Colonic polyps     benign    Diverticulitis     Diverticulitis of colon (without mention of hemorrhage)(562.11) 5/17/2010    Fatty liver     Hyperlipidemia     Kidney stone 6/05    Other and unspecified hyperlipidemia 5/17/2010    Other chronic nonalcoholic liver disease 1/00/3870    Palpitations 8/04    echo showed diastolic dysfunction, holter monitor ( + )  for brief runs of SVT    Renal cyst 11/07    bilateral (7cm on L and 3 cm on R), no adrenal mass    Steatosis, liver 5/22/2010    Tobacco use disorder 5/17/2010       Past Surgical History:          Procedure Laterality Date    BRONCHOSCOPY N/A 1/24/2022    BRONCHOSCOPY ALVEOLAR LAVAGE performed by Siva Levy MD at [address]  1/24/2022    BRONCHOSCOPY THERAPUTIC ASPIRATION INITIAL performed by Siva Levy MD at [address] TEST  10/07, 10/05    stress echo-normal, negative    COLONOSCOPY  7/08    polyps and diverticuli, tubular adenoma    DOPPLER ECHOCARDIOGRAPHY  6/09    moderate in crease LA, otherwise ( - )    IR MIDLINE CATH  1/28/2022    IR MIDLINE CATH 1/28/2022 [facility] SPECIAL PROCEDURES    KIDNEY STONE SURGERY  6/05    LIVER BIOPSY  3/07    fatty liver - no autoimmune hepatits       Medications Prior to Admission:      Prior to Admission medications    Medication Sig Start Date End Date Taking? Authorizing Provider   hydrOXYzine (ATARAX) 10 MG tablet Take 1 tablet by mouth 3 times daily as needed for Anxiety 2/2/22 3/4/22  Randy Shaikh MD   diphenhydrAMINE (BENADRYL) 50 MG tablet Take 1 tablet by mouth nightly as needed for Sleep 2/2/22 3/4/22  Randy Shaikh MD   dilTIAZem (CARDIZEM CD) 120 MG extended release capsule Take 1 capsule by mouth 2 times daily 2/2/22 3/4/22  Randy Shaikh MD   ferrous sulfate (IRON 325) 325 (65 Fe) MG tablet Take 325 mg by mouth 2 times daily    Historical Provider, MD   FLUoxetine (PROZAC) 10 MG capsule Take 10 mg by mouth daily    Historical Provider, MD   ATROVENT HFA 17 MCG/ACT inhaler INHALE 1 PUFF INTO LUNGS THREE TIMES DAILY 3/13/15   Fly Fisher MD   Oral Medication Containers (MEDICATION CONTAINER/SMALL) MISC Take 5 mLs by mouth 4 times daily as needed.  Miles Mixture:  80 cc Viscous Lidocaine, 20 mg Hydrocortisone, 100 mg Doxycycline, 2 million units Nystatin, qs up to 180 cc with Benadryl elixir (cherry or grape) 2/19/15   Fly Fisher MD   fluticasone [facility]) 50 MCG/ACT nasal spray USE 2 SPRAYS IN [facility] NOSTRIL DAILY 10/27/14   Fly Fisher MD   Pseudoephedrine-Ibuprofen  MG TABS Take 1 capsule by mouth 4 times daily as needed. 6/26/14   Catherine Patterson MD   atorvastatin (LIPITOR) 40 MG tablet TAKE 1 TABLET BY MOUTH ONCE DAILY 5/31/14   Catherine Patterson MD   Omega-3 Fatty Acids (OMEGA 3 PO) Take  by mouth. Historical Provider, MD   docusate sodium 100 MG CAPS Take 100 mg by mouth 2 times daily as needed for Constipation. 4/12/13   Luis Vang MD   potassium chloride (POTASSIUM CHLORIDE) 10 MEQ tablet Take 1 tablet by mouth 3 times daily (with meals) for 10 days. Patient taking differently: Take 10 mEq by mouth daily  4/12/13 9/14/20  Luis Vang MD   aspirin 81 MG chewable tablet Take 1 tablet by mouth daily. 4/12/13   Luis Vang MD   metoprolol (LOPRESSOR) 50 MG tablet Take 1 tablet by mouth 2 times daily. Hold this medication if Systolic blood pressure (top number) is less than 90 or if heart rate is less than 60 and call Dr. Michelle Evans office for further instructions. Ask for YOLIE HERNANDEZ [facility]. Call [phone]. 4/12/13   Luis Vang MD   fluticasone-salmeterol (ADVAIR DISKUS) 250-50 MCG/DOSE AEPB Inhale 1 puff into the lungs every 12 hours. Historical Provider, MD       Allergies:  Patient has no known allergies. Social History:      The patient currently lives at the [facility]:   reports that he quit smoking about 8 years ago. His smoking use included cigarettes. He has a 52.00 pack-year smoking history. He has never used smokeless tobacco.  ETOH:   reports no history of alcohol use. E-Cigarettes/Vaping Use     Questions Responses    E-Cigarette/Vaping Use     Start Date     Passive Exposure     Quit Date     Counseling Given     Comments             Family History:    Positive as follows:        Problem Relation Age of Onset    Macular Degen Sister     Thyroid Disease Mother     Cancer Neg Hx     Diabetes Neg Hx     Heart Disease Neg Hx     Kidney Disease Neg Hx        REVIEW OF SYSTEMS COMPLETED:   Pertinent positives as noted in the HPI.  All other systems reviewed and negative. PHYSICAL EXAM PERFORMED:    BP (!) 142/81   Pulse 99   Temp 98.7 °F (37.1 °C) (Oral)   Resp 24   Ht 5' 7\" (1.702 m)   Wt 165 lb (74.8 kg)   SpO2 94%   BMI 25.84 kg/m²     General appearance:  No apparent distress, appears stated age and cooperative. HEENT:  Normal cephalic, atraumatic without obvious deformity. Pupils equal, round, and reactive to light. Extra ocular muscles intact. Conjunctivae/corneas clear. Neck: Supple, with full range of motion. No jugular venous distention. Trachea midline. Respiratory: Tachypneic, increased  respiratory effort. Clear to auscultation bilaterally with occasional rhonchi   cardiovascular: Tachycardic, regular irrhythm with normal S1/S2 without murmurs, rubs or gallops. Abdomen: Soft, non-tender, non-distended with normal bowel sounds. Musculoskeletal:  No clubbing, cyanosis  bilaterally. Full range of motion without deformity. BLE edema 1+  Skin: Skin color, texture, turgor normal.  No rashes or lesions. Neurologic:  Neurovascularly intact without any focal sensory/motor deficits.  Cranial nerves: II-XII intact, grossly non-focal.  Psychiatric:  Alert and oriented, thought content appropriate, normal insight  Capillary Refill: Brisk,3 seconds, normal  Peripheral Pulses: +2 palpable, equal bilaterally       Labs:     Recent Labs     02/26/22  1636   WBC 12.3*   HGB 8.9*   HCT 28.7*   *     Recent Labs     02/26/22  1636      K 4.2   CL 96*   CO2 38*   BUN 28*   CREATININE 0.8   CALCIUM 9.3     Recent Labs     02/26/22  1636   AST 17   ALT 11   BILITOT <0.2   ALKPHOS 115     Recent Labs     02/26/22  1636   INR 1.26*     Recent Labs     02/26/22  1636   TROPONINI <0.01       Urinalysis:      Lab Results   Component Value Date    NITRU Negative 02/26/2022    WBCUA 0-2 12/08/2013    BACTERIA 1+ 08/07/2010    RBCUA 0-2 12/08/2013    BLOODU Negative 02/26/2022    SPECGRAV 1.020 02/26/2022    GLUCOSEU Negative 02/26/2022    GLUCOSEU NEGATIVE 08/07/2010       Radiology:     I personally reviewed the CT chest pulmonary and chest x-ray and also reviewed radiologist report    EKG:  I have reviewed the EKG with the following interpretation: A. fib with RVR, rate 105    CT CHEST PULMONARY EMBOLISM W CONTRAST   Final Result   1. Extensive airway inflammation with opacification of lobar, segmental, and   subsegmental bronchi. Differential includes infection, including   endobronchial tuberculosis, and endobronchial spread of tumor. 2. Inflammation of distal airways, multifocal airspace disease, and small   loculated left pleural effusion. Correlate with presentation for pneumonia   and empyema. 3.  Motion degraded evaluation of the pulmonary arteries. No acute pulmonary   embolism to the proximal segmental arteries given limitation. 4. Other findings as described. XR CHEST PORTABLE   Preliminary Result   Shifting distribution of multifocal consolidation with similar overall   severity. ASSESSMENT:PLAN:      Shortness of breath- Acute on chronic hypercapnic respiratory failure  Complicated pulmonary history including ILD, bronchiectasis, COPD, recurrent MDR pneumonias [E. coli, Pseudomonas],  Paecilomyces lilacinus infection, M. chelonae/abscessus   -Patient usually on 2 to 3 L oxygen, currently on 5 LPM.  Appears very dyspneic. -CT chest shows continued severe airway inflammation and multifocal infiltrates. May benefit from [facility] we will consult pulmonology  -Placed on BiPAP support due to increased work of breathing and CO2 retention. Continue for now at [email or web address]. Due to worsening acidosis, BiPAP settings increased. Follow-up repeat ABG and adjust BiPAP settings accordingly  -ABG reviewed, pH normal with acute on chronic respiratory acidosis and hypoxemia  -Patient received IV vancomycin/meropenem in ER.   Will hold further antibiotics until pulmonology eval in a.m. we will check procalcitonin  -Respiratory isolation for Hx of MDR organisms  -Due to scant wheezing, ordered bronchodilator nebs. Continue Symbicort inhaler twice daily. -Rapid Covid swab negative    Persistent A. Fib  -Resume Cardizem. Hold metoprolol due to soft BP  -Not on AC due to recurrent GI bleed  -Not on amiodarone due to pulmonary toxicity    CAD s/p remote CABG and MAZE  -Stable and asymptomatic, resume aspirin    Hx of COPD, bronchiectasis, former smoker, ILD  -Bronchodilator nebs, Symbicort twice daily  -Continue supplemental oxygen    Recent bacterial pneumonia due to MDRO E. Coli  -Completed IV Carbapenem therapy. May need prolonged course of IV antibiotics, defer to pulmonology. Due to severe inflammation and persistent infiltrates with worsening respiratory failure, may benefit from therapeutic bronchoscopy for airway clearance. Await pulmonology rec    Hx of GI bleed/AVM -no evidence of bleeding at this time      DVT Prophylaxis: Lovenox  Diet: No diet orders on file  Code Status: Full code  PT/OT Eval Status: Not consulted    Dispo -IP stay, admit to PCU       Rasheeda Ramirez MD    Thank you Allan Hagen MD for the opportunity to be involved in this patient's care. If you have any questions or concerns please feel free to contact me at [phone].

## 2022-02-27 NOTE — DISCHARGE SUMMARY
die.  Comfort care. Hospice consulted. He has no family or official POA. He would like his next of kin decision maker to be Scott Casiano if he is ever incapacitated. Scott Casiano supports him in this end-of-life decision.   - palliative morphine, lorazepam, atropine. No life-prolonging medications or interventions. Do not escalate oxygen device(s). In fact, hospice may want to de-escalate his O2 rate if they think this might make him more comfortable.      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 Comfort care. Physical Exam Performed:     BP (!) 146/72   Pulse 132   Temp 97.7 °F (36.5 °C) (Oral)   Resp 28   Ht 5' 7\" (1.702 m)   Wt 169 lb 1.5 oz (76.7 kg)   SpO2 90%   BMI 26.48 kg/m²       General appearance: In distress, appears stated age and cooperative. HEENT: Pupils equal, round, and reactive to light. Conjunctivae/corneas clear. Neck: Supple, with full range of motion. No jugular venous distention. Trachea midline. Respiratory:  Increased respiratory effort. Bilateral expiratory wheezing, rhonchi, coarse breath sounds. Cardiovascular: Regular rate and rhythm with normal S1/S2 without murmurs, rubs or gallops. Abdomen: Soft, non-tender, non-distended with normal bowel sounds. Musculoskeletal: No clubbing, cyanosis. 1+ BLE mostly nonpitting edema. Full range of motion without deformity. Skin: Skin color, texture, turgor normal.  No rashes or lesions. Neurologic:  Neurovascularly intact without any focal sensory/motor deficits. Cranial nerves: II-XII intact, grossly non-focal.  Psychiatric: Alert and oriented, thought content appropriate, normal insight, has capacity  Capillary Refill: Brisk,3 seconds, normal   Peripheral Pulses: +2 palpable, equal bilaterally       Labs:  For convenience and continuity at follow-up the following most recent labs are provided:      CBC:    Lab Results   Component Value Date    WBC 19.8 02/27/2022    HGB 8.0 02/27/2022    HCT 26.0 02/27/2022     02/27/2022 Renal:    Lab Results   Component Value Date     02/27/2022    K 3.7 02/27/2022     02/27/2022    CO2 32 02/27/2022    BUN 24 02/27/2022    CREATININE 0.6 02/27/2022    CALCIUM 9.2 02/27/2022    PHOS 3.2 05/01/2014         Significant Diagnostic Studies    Radiology:   CT CHEST PULMONARY EMBOLISM W CONTRAST   Final Result   1. Extensive airway inflammation with opacification of lobar, segmental, and   subsegmental bronchi. Differential includes infection, including   endobronchial tuberculosis, and endobronchial spread of tumor. 2. Inflammation of distal airways, multifocal airspace disease, and small   loculated left pleural effusion. Correlate with presentation for pneumonia   and empyema. 3.  Motion degraded evaluation of the pulmonary arteries. No acute pulmonary   embolism to the proximal segmental arteries given limitation. 4. Other findings as described. XR CHEST PORTABLE   Preliminary Result   Shifting distribution of multifocal consolidation with similar overall   severity. Consults:     IP CONSULT TO HOSPITALIST  IP CONSULT TO HOSPICE    Disposition:  Readmit under hospice     Condition at Discharge: Terminal    Discharge Instructions/Follow-up:  hospice    Code Status:  DNR-CC     Activity: activity as tolerated    Diet: regular diet      Discharge Medications:     Current Discharge Medication List            Time Spent on discharge is more than 30 minutes in the examination, evaluation, counseling and review of medications and discharge plan. Signed:    Isaac Martinez MD   2/27/2022      Thank you Sean Russ MD for the opportunity to be involved in this patient's care. If you have any questions or concerns please feel free to contact me at [phone].

## 2022-02-27 NOTE — PROGRESS NOTES
range of motion without deformity. Skin: Skin color, texture, turgor normal.  No rashes or lesions. Neurologic:  Neurovascularly intact without any focal sensory/motor deficits. Cranial nerves: II-XII intact, grossly non-focal.  Psychiatric: Alert and oriented, thought content appropriate, normal insight, has capacity  Capillary Refill: Brisk,3 seconds, normal   Peripheral Pulses: +2 palpable, equal bilaterally       Labs:   Recent Labs     02/26/22  1636 02/27/22  0454   WBC 12.3* 19.8*   HGB 8.9* 8.0*   HCT 28.7* 26.0*   * 372     Recent Labs     02/26/22  1636 02/27/22  0454    147*   K 4.2 3.7   CL 96* 102   CO2 38* 32   BUN 28* 24*   CREATININE 0.8 0.6*   CALCIUM 9.3 9.2     Recent Labs     02/26/22  1636   AST 17   ALT 11   BILITOT <0.2   ALKPHOS 115     Recent Labs     02/26/22  1636   INR 1.26*     Recent Labs     02/26/22  1636   TROPONINI <0.01       Urinalysis:      Lab Results   Component Value Date    NITRU Negative 02/26/2022    WBCUA 0-2 12/08/2013    BACTERIA 1+ 08/07/2010    RBCUA 0-2 12/08/2013    BLOODU Negative 02/26/2022    SPECGRAV 1.020 02/26/2022    GLUCOSEU Negative 02/26/2022    GLUCOSEU NEGATIVE 08/07/2010       Radiology:  CT CHEST PULMONARY EMBOLISM W CONTRAST   Final Result   1. Extensive airway inflammation with opacification of lobar, segmental, and   subsegmental bronchi. Differential includes infection, including   endobronchial tuberculosis, and endobronchial spread of tumor. 2. Inflammation of distal airways, multifocal airspace disease, and small   loculated left pleural effusion. Correlate with presentation for pneumonia   and empyema. 3.  Motion degraded evaluation of the pulmonary arteries. No acute pulmonary   embolism to the proximal segmental arteries given limitation. 4. Other findings as described. XR CHEST PORTABLE   Preliminary Result   Shifting distribution of multifocal consolidation with similar overall   severity. 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]      Priority: High    Coronary atherosclerosis of native coronary artery [I25.10]      Priority: High    HTN (hypertension) [I10]      Priority: High    HLD (hyperlipidemia) [E78.5]      Priority: High    Atrial fibrillation (HCC) [I48.91]      Priority: Medium    Respiratory failure with hypoxia (HCC) [J96.91]     COPD (chronic obstructive pulmonary disease) (HCC) [J44.9]     Pneumonia of both lower lobes due to Escherichia coli ([address] [J15.5]     H/O pneumonia due to Pseudomonas [Z87.01]        \"70 y.o. male with PMH of A. Fib, COPD on home O2 2 to 3 L, recent ESBL E. coli pneumonia treated with meropenem, HTN, CAD s/p CABG, HLD who is a resident at the [facility] presented to the ER today with complaints of shortness of breath. He was recently admitted and discharged on 2/2/2022 from [facility]. He was diagnosed with acute respiratory failure, MDRO pneumonia. He required bronchoscopy. He was treated with IV meropenem completed with ertapenem for 2 weeks. Patient reports over the past few days he has gotten more short of breath, continues to have a productive cough. Feels chest heaviness sometimes. \"      Acute on chronic hypoxic and hypercapnic respiratory failure. Likely due to a combination of AECOPD, ILD, bronchiectasis, recurrent pneumonia, NICHOLAS, ineffective airway clearance, and respiratory muscle deconditioning.    - the patient wants hospice, says he is ready to die. Comfort care. Hospice consulted. He has no family or official POA. He would like his next of kin decision maker to be Heidi Hoyt if he is ever incapacitated. Heidi Hoyt supports him in this end-of-life decision.   - palliative morphine, lorazepam, atropine. No life-prolonging medications or interventions. Do not escalate oxygen device(s). In fact, hospice may want to de-escalate his O2 rate if they think this might make him more comfortable.

## 2022-02-27 NOTE — PROGRESS NOTES
02/27/22 0058   NIV Type   Skin Protection for O2 Device Yes   Location Nose   Equipment Type v60   Mode Bilevel   Mask Type Full face mask   Mask Size Large   Settings/Measurements   IPAP 15 cmH20   CPAP/EPAP 5 cmH2O   Rate Ordered 10   Resp (!) 31   FiO2  50 %   I Time/ I Time % 1 s   Vt Exhaled 603 mL   Minute Volume 14.2 Liters   Comfort Level Good   Using Accessory Muscles No   SpO2 97   Alarm Settings   Alarms On Y   Press Low Alarm 6 cmH2O   High Pressure Alarm 30 cmH2O   Apnea (secs) 20 secs   Resp Rate Low Alarm 6   High Respiratory Rate 40 br/min

## 2022-02-27 NOTE — PROGRESS NOTES
RN updated [facility] of code status change, patient is now a [facility], spoke with Mary Siddiqui.

## 2022-02-27 NOTE — PROGRESS NOTES
[address] or Facility: [facility] From: Alexis Keenan RE: Jacoby oRdriguez 1945 RM: [phone] patient here for respiratory failure. he is a MUSE of 6, 's, very labored respirations at 36/minute, 15 L high flow, refusing to wear his bipap, he is very anxious and is wanting to be intubated asap. thank you Need Callback: NEEDS CALLBACK C4 PROGRESSIVE CARE    Sent to Dr Diya Bishop via perfect serve.

## 2022-02-27 NOTE — PROGRESS NOTES
02/26/22 2204   RT Protocol   History Pulmonary Disease 1   Respiratory pattern 2   Breath sounds 2   Cough 1   Bronchodilator Assessment Score 6

## 2022-02-28 NOTE — DISCHARGE SUMMARY
Hospital Medicine Discharge Summary    Patient ID: Savannah Gabriel      Patient's PCP: Loreta Crawley MD    Admitting Physician: No admitting provider for patient encounter. Discharge Physician: Ivelisse Flower MD     Admit Date: 2022     Disposition:     Discharge Diagnoses: Active Hospital Problems    Diagnosis     S/P CABG x 4 [Z95.1]      Priority: High    Coronary atherosclerosis of native coronary artery [I25.10]      Priority: High    HTN (hypertension) [I10]      Priority: High    HLD (hyperlipidemia) [E78.5]      Priority: High    Atrial fibrillation (HCC) [I48.91]      Priority: Medium    Respiratory failure with hypoxia (HCC) [J96.91]     COPD (chronic obstructive pulmonary disease) (HCC) [J44.9]     Pneumonia of both lower lobes due to Escherichia coli ([facility] [J15.5]     H/O pneumonia due to Pseudomonas [Z87.01]        Date of Death: 22   Time of Death: 05:50    Immediate Cause of Death: acute on chronic hypoxic and hypercapnic respiratory failure  Underlying Conditions: Other Contributing Conditions:      Hospital Course:  \"70 y.o. Jyl Badder PMH of A. Fib, COPD on home O2 2 to 3 L, recent ESBL E. coli pneumonia treated with meropenem, HTN, CAD s/p CABG, HLD who is a resident at the [facility] presented to the ER today with complaints of shortness of breath.  He was recently admitted and discharged on 2022 from [address] [place] [address] Shanaabi was diagnosed with acute respiratory failure, MDRO pneumonia.  He required bronchoscopy. [facility] was treated with IV meropenem completed with ertapenem for 2 weeks.  Patient reports over the past few days he has gotten more short of breath, continues to have a productive cough.  Feels chest heaviness sometimes. \"        Acute on chronic hypoxic and hypercapnic respiratory failure.  Likely due to a combination of AECOPD, ILD, bronchiectasis, recurrent pneumonia, NICHOLAS, ineffective airway clearance, and respiratory muscle deconditioning.    - the patient wanted hospice, said he was ready to die.  Comfort care. [facility] consulted. - palliative morphine, lorazepam, atropine.  No life-prolonging medications or interventions.  He passed away.     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  Comfort care. Consults:  IP CONSULT TO HOSPITALIST  IP CONSULT TO HOSPICE    Signed:  Bhavesh Berkowitz MD MD   2/28/2022    Thank you Stefanie Briones MD for the opportunity to be involved in this patient's care. If you have any questions or concerns please feel free to contact me at [phone].

## 2022-03-09 NOTE — PROGRESS NOTES
Physician Progress Note      PATIENT:               Arthur CURRY #:                  [id number]  :                       1945  ADMIT DATE:       2022 3:55 PM  Terry Valentino Deering DATE:        2022 5:50 AM  RESPONDING  PROVIDER #:        Blanco Montes De Oca MD          QUERY TEXT:    Pt admitted with acute respiratory failure. Pt noted to have recurrent PNA   with recent history of PNA d/t e. Coli. If possible, please document in the   progress notes and discharge summary if you are evaluating and/or treating any   of the following:[[Pneumonia likely d/t gram negative organism::This patient   pneumonia likely d/t gram negative organism. }}      The medical record reflects the following:  Risk Factors: recent e coli PNA, abx in the last 90 days, multiple/extensive   respiratory conditions, chronic respiratory failure  Clinical Indicators: H&P \"Recent bacterial pneumonia due to MDRO E. Coli\", LA   2.3, PCT 0.18  Treatment: IV ABX prior to comfort care, CXR/CT, labs    Thank you  Emelina Hernandez RN, CRCR, CCDS  [email or web address]  Options provided:  -- Pneumonia likely d/t e. Coli  -- Other - I will add my own diagnosis  -- Disagree - Not applicable / Not valid  -- Disagree - Clinically unable to determine / Unknown  -- Refer to Clinical Documentation Reviewer    PROVIDER RESPONSE TEXT:    Pneumonia NOS    Query created by: Yvonne Mcdaniel on 3/8/2022 4:26 PM      QUERY TEXT:    Pt admitted with acute respiratory failure and PNA. Noted documentation of   \"Does show signs of sepsis with elevated white blood cell count, elevated   lactic acid. \" by the ED provider.   If possible, please document in progress   notes and discharge summary:    The medical record reflects the following:  Risk Factors: PNA, multiple respiratory conditions  Clinical Indicators: WBC on  12.3, upt to 19.8 on , lactic acid 2.3,   PCT 0.18, HR , RR 22-31, acute organ dysfunction (acute respiratory   failure), ED provider \" Does show signs of sepsis with elevated white blood   cell count, elevated lactic acid. Transfused large-volume IV fluids and   broad-spectrum antibiotics with vancomycin and meropenem to cover for his   recent ESBL infection. Plan to admit to the hospital.  Was placed on BiPAP   for comfort\"  Treatment: IVF bolus', IV ABX, labs, admission, monitoring and supportive,   transitioned to comfort care    Thank you  Mita Alfred RN, CRCR, CCDS  [email or web address]  Options provided:  -- Sepsis, present on arrival  -- Sepsis ruled out  -- Other - I will add my own diagnosis  -- Disagree - Not applicable / Not valid  -- Disagree - Clinically unable to determine / Unknown  -- Refer to Clinical Documentation Reviewer    PROVIDER RESPONSE TEXT:    This patient was not septic.     Query created by: Karo New on 3/8/2022 4:31 PM      Electronically signed by:  Ca Tang MD 3/9/2022 10:20 AM

## 2022-03-15 LAB
AFB CULTURE (MYCOBACTERIA): NORMAL
AFB SMEAR: NORMAL
